# Patient Record
Sex: FEMALE | Race: BLACK OR AFRICAN AMERICAN | Employment: FULL TIME | URBAN - METROPOLITAN AREA
[De-identification: names, ages, dates, MRNs, and addresses within clinical notes are randomized per-mention and may not be internally consistent; named-entity substitution may affect disease eponyms.]

---

## 2017-02-13 ENCOUNTER — NURSE NAVIGATOR (OUTPATIENT)
Dept: FAMILY MEDICINE CLINIC | Age: 37
End: 2017-02-13

## 2017-02-20 ENCOUNTER — APPOINTMENT (OUTPATIENT)
Dept: GENERAL RADIOLOGY | Age: 37
End: 2017-02-20
Attending: PHYSICIAN ASSISTANT
Payer: MEDICAID

## 2017-02-20 ENCOUNTER — HOSPITAL ENCOUNTER (EMERGENCY)
Age: 37
Discharge: HOME | End: 2017-02-20
Attending: EMERGENCY MEDICINE | Admitting: EMERGENCY MEDICINE
Payer: MEDICAID

## 2017-02-20 VITALS
SYSTOLIC BLOOD PRESSURE: 114 MMHG | DIASTOLIC BLOOD PRESSURE: 79 MMHG | HEART RATE: 75 BPM | RESPIRATION RATE: 18 BRPM | BODY MASS INDEX: 43.3 KG/M2 | OXYGEN SATURATION: 98 % | TEMPERATURE: 98 F | WEIGHT: 269.4 LBS | HEIGHT: 66 IN

## 2017-02-20 DIAGNOSIS — S39.012A LUMBAR STRAIN, INITIAL ENCOUNTER: ICD-10-CM

## 2017-02-20 DIAGNOSIS — S96.912A ANKLE STRAIN, LEFT, INITIAL ENCOUNTER: Primary | ICD-10-CM
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
## 2017-02-20 DIAGNOSIS — S66.912A WRIST STRAIN, LEFT, INITIAL ENCOUNTER: ICD-10-CM

## 2017-02-20 PROCEDURE — 72100 X-RAY EXAM L-S SPINE 2/3 VWS: CPT

## 2017-02-20 PROCEDURE — 99282 EMERGENCY DEPT VISIT SF MDM: CPT

## 2017-02-20 PROCEDURE — 73110 X-RAY EXAM OF WRIST: CPT

## 2017-02-20 PROCEDURE — 73610 X-RAY EXAM OF ANKLE: CPT

## 2017-02-20 RX ORDER — IBUPROFEN 800 MG/1
800 TABLET ORAL
Qty: 30 TAB | Refills: 0 | Status: SHIPPED | OUTPATIENT
Start: 2017-02-20 | End: 2017-03-02

## 2017-02-20 RX ORDER — CYCLOBENZAPRINE HCL 10 MG
10 TABLET ORAL
Qty: 20 TAB | Refills: 0 | Status: SHIPPED | OUTPATIENT
Start: 2017-02-20 | End: 2017-05-26

## 2017-02-20 NOTE — DISCHARGE INSTRUCTIONS
Getting Back to Normal After Low Back Pain: Care Instructions  Your Care Instructions  Almost everyone has low back pain at some time. The good news is that most low back pain will go away in a few days or weeks with some basic self-care. Some people are afraid that doing too much may make their pain worse. In the past, people stayed in bed, thinking this would help their backs. Now doctors think that, in most cases, getting back to your normal activities is good for your back, as long as you avoid doing things that make your pain worse. Follow-up care is a key part of your treatment and safety. Be sure to make and go to all appointments, and call your doctor if you are having problems. It's also a good idea to know your test results and keep a list of the medicines you take. How can you care for yourself at home? Ease back into daily activities  · For the first day or two of pain, take it easy. But as soon as possible, get back to your normal daily life and activities. · Get gentle exercise, such as walking. Movement keeps your spine flexible and helps your muscles stay strong. · If you are an athlete, return to your activity carefully. Choose a low-impact option until your pain is under control. Avoid or change activities that cause pain  · Try to avoid too much bending, heavy lifting, or reaching. These movements put extra stress on your back. · In bed, try lying on your side with a pillow between your knees. Or lie on your back on the floor with a pillow under your knees. · When you sit, place a small pillow, a rolled-up towel, or a lumbar roll in the curve of your back for extra support. · Try putting one foot up on a stool or changing positions every few minutes if you have to stand still for a period of time. Pay attention to body mechanics and posture  Body mechanics are the way you use your body. Posture is the way you sit or stand. · Take extra care when you lift.  When you must lift, bend your knees and keep your back straight. Avoid twisting, and keep the load close to your body. · Stand or sit tall, with your shoulders back and your stomach pulled in to support your back. Get support when you need it  · Let people know when you need a helping hand. Get family members or friends to help out with tasks you cannot do right now. · Be honest with your doctor about how the pain affects you. · If you have had to take time off work, talk to your doctor and boss about a gradual [id number] plan. Find out if there are other ways you could do your job to avoid hurting your back again. Reduce stress  Worrying about the pain can cause you to tense the muscles in your lower back. This in turn causes more pain. Here are a few things you can do to relax your mind and your muscles:  · Take 10 to 15 minutes to sit quietly and breathe deeply. Try to focus only on your breathing. If you cannot keep thoughts away, think about things that make you feel good. · Get involved in your favorite hobby, or try something new. · Talk to a friend, read a book, or listen to your favorite music. · Find a counselor you like and trust. Talk openly and honestly about your problems. Be willing to make some changes. When should you call for help? Call 911 anytime you think you may need emergency care. For example, call if:  · You are unable to move a leg at all. Call your doctor now or seek immediate medical care if:  · You have new or worse symptoms in your legs, belly, or buttocks. Symptoms may include:  ¨ Numbness or tingling. ¨ Weakness. ¨ Pain. · You lose bladder or bowel control. Watch closely for changes in your health, and be sure to contact your doctor if:  · You are not getting better as expected. Where can you learn more? Go to http://joao-ladi.info/. Enter N852 in the search box to learn more about \"Getting Back to Normal After Low Back Pain: Care Instructions. \"  Current as of:  May 23, 2016  Content Version: 11.1  © 4954-3354 [employer]. Care instructions adapted under license by [employer] (which disclaims liability or warranty for this information). If you have questions about a medical condition or this instruction, always ask your healthcare professional. [address] any warranty or liability for your use of this information. Hand Pain: Care Instructions  Your Care Instructions  Common causes of hand pain are overuse and injuries, such as might happen during sports or home repair projects. Everyday wear and tear, especially as you get older, also can cause hand pain. Most minor hand injuries will heal on their own, and home treatment is usually all you need to do. If you have sudden and severe pain, you may need tests and treatment. Follow-up care is a key part of your treatment and safety. Be sure to make and go to all appointments, and call your doctor if you are having problems. Its also a good idea to know your test results and keep a list of the medicines you take. How can you care for yourself at home? · Take pain medicines exactly as directed. ¨ If the doctor gave you a prescription medicine for pain, take it as prescribed. ¨ If you are not taking a prescription pain medicine, ask your doctor if you can take an over-the-counter medicine. · Rest and protect your hand. Take a break from any activity that may cause pain. · Put ice or a cold pack on your hand for 10 to 20 minutes at a time. Put a thin cloth between the ice and your skin. · Prop up the sore hand on a pillow when you ice it or anytime you sit or lie down during the next 3 days. Try to keep it above the level of your heart. This will help reduce swelling. · If your doctor recommends a sling, splint, or elastic bandage to support your hand, wear it as directed. When should you call for help? Call 911 anytime you think you may need emergency care.  For example, call if:  · Your hand turns cool or pale or changes color. Call your doctor now or seek immediate medical care if:  · You cannot move your hand. · Your hand pops, moves out of its normal position, and then returns to its normal position. · You have signs of infection, such as:  ¨ Increased pain, swelling, warmth, or redness. ¨ Red streaks leading from the sore area. ¨ Pus draining from a place on your hand. ¨ A fever. · Your hand feels numb or tingly. Watch closely for changes in your health, and be sure to contact your doctor if:  · Your hand feels unstable when you try to use it. · You do not get better as expected. · You have any new symptoms, such as swelling. · Bruises from an injury to your hand last longer than 2 weeks. Where can you learn more? Go to http://joaoAppfluent Technologyladi.info/. Enter R273 in the search box to learn more about \"Hand Pain: Care Instructions. \"  Current as of: May 27, 2016  Content Version: 11.1  © 0064-1901 [employer]. Care instructions adapted under license by [employer] (which disclaims liability or warranty for this information). If you have questions about a medical condition or this instruction, always ask your healthcare professional. [address] any warranty or liability for your use of this information. Ankle Sprain: Care Instructions  Your Care Instructions    An ankle sprain can happen when you twist your ankle. The ligaments that support the ankle can get stretched and torn. Often the ankle is swollen and painful. Ankle sprains may take from several weeks to several months to heal. Usually, the more pain and swelling you have, the more severe your ankle sprain is and the longer it will take to heal. You can heal faster and regain strength in your ankle with good home treatment.   It is very important to give your ankle time to heal completely, so that you do not easily hurt your ankle again. Follow-up care is a key part of your treatment and safety. Be sure to make and go to all appointments, and call your doctor if you are having problems. It's also a good idea to know your test results and keep a list of the medicines you take. How can you care for yourself at home? · Prop up your foot on pillows as much as possible for the next 3 days. Try to keep your ankle above the level of your heart. This will help reduce the swelling. · Follow your doctor's directions for wearing a splint or elastic bandage. Wrapping the ankle may help reduce or prevent swelling. · Your doctor may give you a splint, a brace, an air stirrup, or another form of ankle support to protect your ankle until it is healed. Wear it as directed while your ankle is healing. Do not remove it unless your doctor tells you to. After your ankle has healed, ask your doctor whether you should wear the brace when you exercise. · Put ice or cold packs on your injured ankle for 10 to 20 minutes at a time. Try to do this every 1 to 2 hours for the next 3 days (when you are awake) or until the swelling goes down. Put a thin cloth between the ice and your skin. · You may need to use crutches until you can walk without pain. If you do use crutches, try to bear some weight on your injured ankle if you can do so without pain. This helps the ankle heal.  · Take pain medicines exactly as directed. ¨ If the doctor gave you a prescription medicine for pain, take it as prescribed. ¨ If you are not taking a prescription pain medicine, ask your doctor if you can take an over-the-counter medicine. · If you have been given ankle exercises to do at home, do them exactly as instructed. These can promote healing and help prevent lasting weakness. When should you call for help? Call your doctor now or seek immediate medical care if:  · Your pain is getting worse. · Your swelling is getting worse.   · Your splint feels too tight or you are unable to loosen it. Watch closely for changes in your health, and be sure to contact your doctor if:  · You are not getting better after 1 week. Where can you learn more? Go to http://joao-ladi.info/. Enter B472 in the search box to learn more about \"Ankle Sprain: Care Instructions. \"  Current as of: May 23, 2016  Content Version: 11.1  © 4562-0729 [employer] Care instructions adapted under license by [employer] (which disclaims liability or warranty for this information). If you have questions about a medical condition or this instruction, always ask your healthcare professional. [address] any warranty or liability for your use of this information.

## 2017-02-20 NOTE — ED PROVIDER NOTES
HPI Comments: Melony Liang, 40 y.o. female, presents ambulatory to ED [facility] ED with cc of aching left ankle pain with associated swelling, left wrist pain, and low back pain secondary to a mechanical GLF that occurred two days ago. The patient reports that her pain started yesterday. The patient reports that she slipped on a puddle of water and fell backwards and caught herself with her left hand. The patient states that she applied a warm compress and took ibuprofen 800 mg with no relief. The patient states that she is right-handed. The patient denies any prior injuries to her ankle/wrist/back, head injury, or chance of pregnancy. The patient specifically denies a headache, chest pain, abdominal pain, or other acute complaints at this time. PCP: Geovanna Lawson MD    Social history significant for: + Tobacco (1 PPD), - EtOH, - Illicit Drug Use    There are no other complaints, changes, or physical findings at this time. Written by Miko Garcia ED Scribe, as dictated by Marii Jimenez .    The history is provided by the patient. No  was used. Past Medical History:   Diagnosis Date    Arthritis     Asthma      bronchitis    Bronchitis     HTN, goal below 140/90 2012    Psychiatric disorder      bipolar       Past Surgical History:   Procedure Laterality Date    Delivery       Hx tubal ligation       c section    Hx heent       4 wisdom teeth removed in 2011         Family History:   Problem Relation Age of Onset    Hypertension Father     Psychiatric Disorder Father      schzophrenia    Asthma Son     Asthma Son     Asthma Daughter        Social History     Social History    Marital status: SINGLE     Spouse name: N/A    Number of children: N/A    Years of education: N/A     Occupational History    Not on file.      Social History Main Topics    Smoking status: Current Every Day Smoker     Packs/day: 1.00     Years: 12.00    Smokeless tobacco: Never Used    Alcohol use No    Drug use: No    Sexual activity: Yes     Partners: Male     Birth control/ protection: Condom     Other Topics Concern    Not on file     Social History Narrative         ALLERGIES: Codeine; Tessalon [benzonatate]; and Tramadol    Review of Systems   Constitutional: Negative for chills and fever. Respiratory: Negative for stridor. Cardiovascular: Negative for chest pain. Gastrointestinal: Negative for abdominal pain, diarrhea, nausea and vomiting. Musculoskeletal: Positive for arthralgias (Left ankle, left wrist), back pain and joint swelling (Left ankle). Neurological: Negative for headaches. All other systems reviewed and are negative. Vitals:    02/20/17 0736 02/20/17 0901   BP: 140/76 114/79   Pulse: 74 75   Resp: 18 18   Temp: 98 °F (36.7 °C)    SpO2: 100% 98%   Weight: 122.2 kg (269 lb 6.4 oz)    Height: 5' 6\" (1.676 m)             Physical Exam   Constitutional: She is oriented to person, place, and time. She appears well-developed and well-nourished. No distress. 39 yo -American female   HENT:   Head: Normocephalic and atraumatic. Eyes: Conjunctivae are normal. Right eye exhibits no discharge. Left eye exhibits no discharge. Neck: Normal range of motion. Neck supple. Cardiovascular: Normal rate, regular rhythm, normal heart sounds and intact distal pulses. No murmur heard. Pulmonary/Chest: Effort normal and breath sounds normal. No respiratory distress. She has no wheezes. Musculoskeletal:   BACK: Normal spinal curvatures. No step off or deformity. NT to palpation. No contusions or abrasions. Negative supine SLR bilaterally. Strength of the LE 5/5 and equal bilaterally. L HAND/WRIST: No swelling, ecchymosis or deformity. NT to palpation with from of the wrist and fingers. Distal NV intact. Cap refill < 2 sec. L ANKLE: No swelling, ecchymosis or deformity.  Slight TTP to the anterior ankle; otherwise NT to palpation of the remainder of the lower extremity. FROM of the ankle and toes. No crepitus or laxity. Ambulatory without difficulty or limp. Lymphadenopathy:     She has no cervical adenopathy. Neurological: She is alert and oriented to person, place, and time. Skin: Skin is warm and dry. She is not diaphoretic. Psychiatric: She has a normal mood and affect. Her behavior is normal.   Nursing note and vitals reviewed. MDM  Number of Diagnoses or Management Options  Diagnosis management comments:   DDx: Sprain, strain, fracture, contusion, soft tissue injury. After physical examination, the patient and no indication of a trauma to suggest a fracture. Xrays were ordered per patient request.       Amount and/or Complexity of Data Reviewed  Tests in the radiology section of CPT®: ordered and reviewed  Review and summarize past medical records: yes    Patient Progress  Patient progress: stable    ED Course       Procedures    7:51 AM  Discussed the risks of smoking and the benefits of smoking cessation as well as the long term sequelae of smoking with the pt. The patient verbalized their understanding. Written by Rae Ruiz ED Scribe, as dictated by Dave Tovar.    Progress Note:  9:30 AM  The patient's imaging results have been discussed with her, and she conveys her understanding of the results. The patient conveys her agreement with the plan of care. Written by Rae Ruiz ED Scribe, as dictated by Dave Tovar.    IMAGING RESULTS:  EXAM: XR WRIST LT AP/LAT/OBL MIN 3V     INDICATION: Trauma. Fell 2 days ago. Pain in left wrist.     COMPARISON: Left wrist x-ray 2/17/16.     FINDINGS: Three views of the left wrist demonstrate no fracture or other acute  osseous or articular abnormality.  The soft tissues are within normal limits.     IMPRESSION  IMPRESSION: No acute osseous abnormality.            EXAM: XR ANKLE LT MIN 3 V     INDICATION: Trauma.     COMPARISON: None.     FINDINGS: Three views of the left ankle demonstrate no fracture or disruption of  the ankle mortise. There is no other acute osseous or articular abnormality. The soft tissues are within normal limits.     IMPRESSION  IMPRESSION: No acute osseous abnormality demonstrated. No significant change                   EXAM: XR SPINE LUMB 2 OR 3 V  INDICATION: Back Pain, patient post fall 2 days ago. Back pain after fall 2  days ago. COMPARISON: Lumbar spine x-ray 8/6/15  TECHNIQUE:   Standard three-view lumbar spine x-ray with frontal, lateral and cone-down  lumbosacral views. FINDINGS:   Vertebral body heights and disc spaces are adequately maintained. 5 normal  lumbar type vertebra are demonstrated. No evidence of destruction fracture or other acute osseous abnormality     IMPRESSION  IMPRESSION: No acute osseous abnormality or interval change.            IMPRESSION:  1. Ankle strain, left, initial encounter    2. Wrist strain, left, initial encounter    3. Lumbar strain, initial encounter    4. Fall, initial encounter        PLAN:  1. Current Discharge Medication List      START taking these medications    Details   cyclobenzaprine (FLEXERIL) 10 mg tablet Take 1 Tab by mouth three (3) times daily as needed for Muscle Spasm(s). Qty: 20 Tab, Refills: 0      ibuprofen (MOTRIN) 800 mg tablet Take 1 Tab by mouth every eight (8) hours as needed for Pain for up to 10 days. Qty: 30 Tab, Refills: 0         CONTINUE these medications which have NOT CHANGED    Details   albuterol (PROVENTIL HFA, VENTOLIN HFA, PROAIR HFA) 90 mcg/actuation inhaler Take 2 Puffs by inhalation every four (4) hours as needed for Wheezing. HYDROCHLOROTHIAZIDE PO Take 12.5 mg by mouth. 2.   Follow-up Information     Follow up With Details Comments [address] [place], MD In 1 week  [address] [place] [id number] [phone]        3. Back - Rest, heat, massage and gentle stretches  4.  Ankle/Wrist - Ice and elevate  Return to ED if worse Discharge Note:  9:31 AM  The pt is ready for discharge. The pt's signs, symptoms, diagnosis, and discharge instructions have been discussed and pt has conveyed their understanding. The pt is to follow up as recommended or return to ER should their symptoms worsen. Plan has been discussed and pt is in agreement. This note is prepared by Elmer Osman, acting as a Scribe for ALISHA Langford : The scribe's documentation has been prepared under my direction and personally reviewed by me in its entirety. I confirm that the notes above accurately reflects all work, treatment, procedures, and medical decision making performed by me. 9:40 AM   After patient discharged from ED room 8, patient exited to the ER lobby and realized she left her phone  in the room. Patient walked very briskly back to room 8 while ED tech was cleaning room to obtain her . Patient exited with  in her hand.

## 2017-02-20 NOTE — ED TRIAGE NOTES
PT. Presents to ED today for complaints of L knee, hand a back pain after a fall on Saturday. Pt. States that [place]. Pt. Denies hitting her head. Pt. Alert and oriented x4. PT. Placed in bed in position of comfort with call bell in reach.

## 2017-02-20 NOTE — ED NOTES
Patient discharged by CAROL Walsh. Patient provided with discharge instructions Rx and instructions on follow up care. Patient out of ED ambulatory accompanied by self.

## 2017-03-20 ENCOUNTER — HOSPITAL ENCOUNTER (EMERGENCY)
Age: 37
Discharge: HOME | End: 2017-03-20
Attending: EMERGENCY MEDICINE
Payer: MEDICAID

## 2017-03-20 ENCOUNTER — APPOINTMENT (OUTPATIENT)
Dept: GENERAL RADIOLOGY | Age: 37
End: 2017-03-20
Attending: EMERGENCY MEDICINE
Payer: MEDICAID

## 2017-03-20 VITALS
BODY MASS INDEX: 43.72 KG/M2 | RESPIRATION RATE: 18 BRPM | TEMPERATURE: 98.2 F | SYSTOLIC BLOOD PRESSURE: 150 MMHG | DIASTOLIC BLOOD PRESSURE: 91 MMHG | OXYGEN SATURATION: 99 % | WEIGHT: 272.05 LBS | HEIGHT: 66 IN | HEART RATE: 71 BPM

## 2017-03-20 DIAGNOSIS — R19.7 DIARRHEA, UNSPECIFIED TYPE: ICD-10-CM

## 2017-03-20 DIAGNOSIS — J20.9 ACUTE BRONCHITIS, UNSPECIFIED ORGANISM: Primary | ICD-10-CM

## 2017-03-20 LAB
FLUAV AG NPH QL IA: NEGATIVE
FLUBV AG NOSE QL IA: NEGATIVE

## 2017-03-20 PROCEDURE — 77030029684 HC NEB SM VOL KT MONA -A

## 2017-03-20 PROCEDURE — 94640 AIRWAY INHALATION TREATMENT: CPT

## 2017-03-20 PROCEDURE — 74011000250 HC RX REV CODE- 250: Performed by: EMERGENCY MEDICINE

## 2017-03-20 PROCEDURE — 99282 EMERGENCY DEPT VISIT SF MDM: CPT

## 2017-03-20 PROCEDURE — 87804 INFLUENZA ASSAY W/OPTIC: CPT | Performed by: EMERGENCY MEDICINE

## 2017-03-20 PROCEDURE — 71020 XR CHEST PA LAT: CPT

## 2017-03-20 RX ORDER — PREDNISONE 20 MG/1
60 TABLET ORAL DAILY
Qty: 15 TAB | Refills: 0 | Status: SHIPPED | OUTPATIENT
Start: 2017-03-20 | End: 2017-03-25

## 2017-03-20 RX ORDER — LOPERAMIDE HYDROCHLORIDE 2 MG/1
2 CAPSULE ORAL
Qty: 20 CAP | Refills: 0 | Status: SHIPPED | OUTPATIENT
Start: 2017-03-20 | End: 2017-03-20

## 2017-03-20 RX ORDER — PREDNISONE 20 MG/1
60 TABLET ORAL DAILY
Qty: 15 TAB | Refills: 0 | Status: SHIPPED | OUTPATIENT
Start: 2017-03-20 | End: 2017-03-20

## 2017-03-20 RX ORDER — IPRATROPIUM BROMIDE 0.5 MG/2.5ML
0.5 SOLUTION RESPIRATORY (INHALATION)
Status: COMPLETED | OUTPATIENT
Start: 2017-03-20 | End: 2017-03-20

## 2017-03-20 RX ORDER — ALBUTEROL SULFATE 0.83 MG/ML
5 SOLUTION RESPIRATORY (INHALATION)
Status: COMPLETED | OUTPATIENT
Start: 2017-03-20 | End: 2017-03-20

## 2017-03-20 RX ORDER — HYDROCODONE POLISTIREX AND CHLORPHENIRAMINE POLISTIREX 10; 8 MG/5ML; MG/5ML
5 SUSPENSION, EXTENDED RELEASE ORAL
Qty: 60 ML | Refills: 0 | Status: SHIPPED | OUTPATIENT
Start: 2017-03-20 | End: 2017-04-09

## 2017-03-20 RX ORDER — GUAIFENESIN, PSEUDOEPHEDRINE HYDROCHLORIDE 600; 60 MG/1; MG/1
1 TABLET, EXTENDED RELEASE ORAL EVERY 12 HOURS
Qty: 20 TAB | Refills: 0 | Status: SHIPPED | OUTPATIENT
Start: 2017-03-20 | End: 2017-05-26

## 2017-03-20 RX ORDER — LOPERAMIDE HYDROCHLORIDE 2 MG/1
2 CAPSULE ORAL
Qty: 20 CAP | Refills: 0 | Status: SHIPPED | OUTPATIENT
Start: 2017-03-20 | End: 2017-03-30

## 2017-03-20 RX ORDER — NAPROXEN 250 MG/1
500 TABLET ORAL
Status: DISCONTINUED | OUTPATIENT
Start: 2017-03-20 | End: 2017-03-20 | Stop reason: HOSPADM

## 2017-03-20 RX ORDER — GUAIFENESIN, PSEUDOEPHEDRINE HYDROCHLORIDE 600; 60 MG/1; MG/1
1 TABLET, EXTENDED RELEASE ORAL EVERY 12 HOURS
Qty: 20 TAB | Refills: 0 | Status: SHIPPED | OUTPATIENT
Start: 2017-03-20 | End: 2017-03-20

## 2017-03-20 RX ADMIN — ALBUTEROL SULFATE 5 MG: 2.5 SOLUTION RESPIRATORY (INHALATION) at 08:08

## 2017-03-20 RX ADMIN — IPRATROPIUM BROMIDE 0.5 MG: 0.5 SOLUTION RESPIRATORY (INHALATION) at 08:08

## 2017-03-20 NOTE — DISCHARGE INSTRUCTIONS
Bronchitis: Care Instructions  Your Care Instructions    Bronchitis is inflammation of the bronchial tubes, which carry air to the lungs. The tubes swell and produce mucus, or phlegm. The mucus and inflamed bronchial tubes make you cough. You may have trouble breathing. Most cases of bronchitis are caused by viruses like those that cause colds. Antibiotics usually do not help and they may be harmful. Bronchitis usually develops rapidly and lasts about 2 to 3 weeks in otherwise healthy people. Follow-up care is a key part of your treatment and safety. Be sure to make and go to all appointments, and call your doctor if you are having problems. It's also a good idea to know your test results and keep a list of the medicines you take. How can you care for yourself at home? · Take all medicines exactly as prescribed. Call your doctor if you think you are having a problem with your medicine. · Get some extra rest.  · Take an over-the-counter pain medicine, such as acetaminophen (Tylenol), ibuprofen (Advil, Motrin), or naproxen (Aleve) to reduce fever and relieve body aches. Read and follow all instructions on the label. · Do not take two or more pain medicines at the same time unless the doctor told you to. Many pain medicines have acetaminophen, which is Tylenol. Too much acetaminophen (Tylenol) can be harmful. · Take an over-the-counter cough medicine that contains dextromethorphan to help quiet a dry, hacking cough so that you can sleep. Avoid cough medicines that have more than one active ingredient. Read and follow all instructions on the label. · Breathe moist air from a humidifier, hot shower, or sink filled with hot water. The heat and moisture will thin mucus so you can cough it out. · Do not smoke. Smoking can make bronchitis worse. If you need help quitting, talk to your doctor about stop-smoking programs and medicines. These can increase your chances of quitting for good.   When should you call for help? Call 911 anytime you think you may need emergency care. For example, call if:  · You have severe trouble breathing. Call your doctor now or seek immediate medical care if:  · You have new or worse trouble breathing. · You cough up dark brown or bloody mucus (sputum). · You have a new or higher fever. · You have a new rash. Watch closely for changes in your health, and be sure to contact your doctor if:  · You cough more deeply or more often, especially if you notice more mucus or a change in the color of your mucus. · You are not getting better as expected. Where can you learn more? Go to http://joao-ladi.info/. Enter H333 in the search box to learn more about \"Bronchitis: Care Instructions. \"  Current as of: May 23, 2016  Content Version: 11.1  © 5487-3923 [employer]. Care instructions adapted under license by [facility] (which disclaims liability or warranty for this information). If you have questions about a medical condition or this instruction, always ask your healthcare professional. [place] [address] any warranty or liability for your use of this information. Diarrhea: Care Instructions  Your Care Instructions    Diarrhea is loose, watery stools (bowel movements). The exact cause is often hard to find. Sometimes diarrhea is your body's way of getting rid of what caused an upset stomach. Viruses, food poisoning, and many medicines can cause diarrhea. Some people get diarrhea in response to emotional stress, anxiety, or certain foods. Almost everyone has diarrhea now and then. It usually isn't serious, and your stools will return to normal soon. The important thing to do is replace the fluids you have lost, so you can prevent dehydration. The doctor has checked you carefully, but problems can develop later. If you notice any problems or new symptoms, get medical treatment right away.   Follow-up care is a key part of your treatment and safety. Be sure to make and go to all appointments, and call your doctor if you are having problems. It's also a good idea to know your test results and keep a list of the medicines you take. How can you care for yourself at home? · Watch for signs of dehydration, which means your body has lost too much water. Dehydration is a serious condition and should be treated right away. Signs of dehydration are:  ¨ Increasing thirst and dry eyes and mouth. ¨ Feeling faint or lightheaded. ¨ Darker urine, and a smaller amount of urine than normal.  · To prevent dehydration, drink plenty of fluids, enough so that your urine is light yellow or clear like water. Choose water and other caffeine-free clear liquids until you feel better. If you have kidney, heart, or liver disease and have to limit fluids, talk with your doctor before you increase the amount of fluids you drink. · Begin eating small amounts of mild foods the next day, if you feel like it. ¨ Try yogurt that has live cultures of Lactobacillus. (Check the label.)  ¨ Avoid spicy foods, fruits, alcohol, and caffeine until 48 hours after all symptoms are gone. ¨ Avoid chewing gum that contains sorbitol. ¨ Avoid dairy products (except for yogurt with Lactobacillus) while you have diarrhea and for 3 days after symptoms are gone. · The doctor may recommend that you take over-the-counter medicine, such as loperamide (Imodium), if you still have diarrhea after 6 hours. Read and follow all instructions on the label. Do not use this medicine if you have bloody diarrhea, a high fever, or other signs of serious illness. Call your doctor if you think you are having a problem with your medicine. When should you call for help? Call 911 anytime you think you may need emergency care. For example, call if:  · You passed out (lost consciousness). · Your stools are maroon or very bloody.   Call your doctor now or seek immediate medical care if:  · You are dizzy or lightheaded, or you feel like you may faint. · Your stools are black and look like tar, or they have streaks of blood. · You have new or worse belly pain. · You have symptoms of dehydration, such as:  ¨ Dry eyes and a dry mouth. ¨ Passing only a little dark urine. ¨ Feeling thirstier than usual.  · You have a new or higher fever. Watch closely for changes in your health, and be sure to contact your doctor if:  · Your diarrhea is getting worse. · You see pus in the diarrhea. · You are not getting better after 2 days (48 hours). Where can you learn more? Go to http://joao-ladi.info/. Enter A253 in the search box to learn more about \"Diarrhea: Care Instructions. \"  Current as of: May 27, 2016  Content Version: 11.1  © 3642-3006 [employer]. Care instructions adapted under license by [employer] (which disclaims liability or warranty for this information). If you have questions about a medical condition or this instruction, always ask your healthcare professional. [address] any warranty or liability for your use of this information.

## 2017-03-20 NOTE — ED PROVIDER NOTES
HPI Comments: Sabrina Tellez, 40 y.o. Female with PMHx significant for bronchitis, presents ambulatory to [facility] ED with cc of a constellation of symptoms such as a dry cough, generalized body aches, clear eye drainage, nasal congestion, and intermittent episodes of diarrhea x 3 days. The patient states that she used her albuterol inhaler at home with no relief. The patient specifically denies fevers, chills, nausea, vomiting, SOB, wheezing, or other acute complaints at this time. PCP: Germaine Del Rio MD    Social history significant for: + Tobacco (1 PPD), - EtOH, - Illicit Drug Use    There are no other complaints, changes, or physical findings at this time. Written by CORKY Gibbs, as dictated by Ivette Gonzalez MD.    The history is provided by the patient. No  was used. Past Medical History:   Diagnosis Date    Arthritis     Asthma     bronchitis    Bronchitis     HTN, goal below 140/90 2012    Psychiatric disorder     bipolar       Past Surgical History:   Procedure Laterality Date    DELIVERY       HX HEENT      4 wisdom teeth removed in 2011    HX TUBAL LIGATION      c section         Family History:   Problem Relation Age of Onset    Hypertension Father     Psychiatric Disorder Father      schzophrenia    Asthma Son     Asthma Son     Asthma Daughter        Social History     Social History    Marital status: SINGLE     Spouse name: N/A    Number of children: N/A    Years of education: N/A     Occupational History    Not on file. Social History Main Topics    Smoking status: Current Every Day Smoker     Packs/day: 1.00     Years: 12.00    Smokeless tobacco: Never Used    Alcohol use No    Drug use: No    Sexual activity: Yes     Partners: Male     Birth control/ protection: Condom     Other Topics Concern    Not on file     Social History Narrative         ALLERGIES: Codeine;  Tessalon [benzonatate]; and Tramadol    Review of Systems   Constitutional: Negative for chills, diaphoresis, fever and unexpected weight change. HENT: Positive for congestion. Negative for rhinorrhea and sore throat. Eyes: Positive for discharge. Negative for pain. Respiratory: Positive for cough. Negative for shortness of breath, wheezing and stridor. Cardiovascular: Negative for chest pain and leg swelling. Gastrointestinal: Positive for diarrhea. Negative for abdominal pain, blood in stool, nausea and vomiting. Genitourinary: Negative for difficulty urinating, dysuria and flank pain. Musculoskeletal: Positive for myalgias. Negative for back pain and neck stiffness. Skin: Negative for rash. Neurological: Negative for seizures, syncope, weakness, light-headedness and headaches. Psychiatric/Behavioral: Negative for confusion. Vitals:    03/20/17 0651   BP: (!) 150/91   Pulse: 71   Resp: 18   Temp: 98.2 °F (36.8 °C)   SpO2: 100%   Weight: 123.4 kg (272 lb 0.8 oz)   Height: 5' 6\" (1.676 m)            Physical Exam   Constitutional: She is oriented to person, place, and time. She appears well-developed and well-nourished. No distress. Elevated BMI   HENT:   Nose: Nose normal.   Mouth/Throat: Oropharynx is clear and moist. Mucous membranes are dry. No oropharyngeal exudate. Eyes: Conjunctivae and EOM are normal. Pupils are equal, round, and reactive to light. Right eye exhibits no discharge. Left eye exhibits no discharge. No scleral icterus. Neck: Normal range of motion. Neck supple. No JVD present. Cardiovascular: Normal rate, regular rhythm, normal heart sounds and intact distal pulses. No murmur heard. Pulmonary/Chest: Effort normal and breath sounds normal. No stridor. No respiratory distress. She has no wheezes. She has no rales. Abdominal: Soft. Bowel sounds are normal. She exhibits no distension. There is no tenderness. There is no rebound and no guarding. Musculoskeletal: She exhibits no edema or tenderness. Neurological: She is alert and oriented to person, place, and time. Skin: Skin is warm and dry. No rash noted. She is not diaphoretic. Psychiatric: She has a normal mood and affect. Nursing note and vitals reviewed. Written by Refugio Stover, ED Scribe, as dictated by Aurelia Gonzalez MD.    MDM  Number of Diagnoses or Management Options  Acute bronchitis, unspecified organism:   Diarrhea, unspecified type:   Diagnosis management comments: Acute bronchitis. Cxray clear. Pt with normal respiratory effort, normal SpO2. No ABX targets reached. Pt also with diarrhea. Pt non-toxic, afebrile, without evidence of significant dehydration. Able to PO hydrate. Stable for discharge. Amount and/or Complexity of Data Reviewed  Clinical lab tests: ordered and reviewed  Tests in the radiology section of CPT®: ordered and reviewed  Review and summarize past medical records: yes    Patient Progress  Patient progress: stable    ED Course       Procedures    7:14 AM  Discussed the risks of smoking and the benefits of smoking cessation as well as the long term sequelae of smoking with the pt. The patient verbalized their understanding. Written by Refugio Stover, ED Scribe, as dictated by Aurelia Gonzalez MD.    LABORATORY TESTS:  Recent Results (from the past 12 hour(s))   INFLUENZA A & B AG (RAPID TEST)    Collection Time: 03/20/17  8:11 AM   Result Value Ref Range    Influenza A Antigen NEGATIVE  NEG      Influenza B Antigen NEGATIVE  NEG         IMAGING RESULTS:  CXR Results  (Last 48 hours)               03/20/17 0751  XR CHEST PA LAT Final result    Impression:  IMPRESSION: No acute intrathoracic disease. Narrative:  CLINICAL HISTORY: Cough, fever    INDICATION: Cough       COMPARISON: None       FINDINGS:    PA and lateral views of the chest are obtained. The cardiopericardial silhouette is within normal limits. There is no pleural   effusion, pneumothorax or focal consolidation present. MEDICATIONS GIVEN:  Medications   naproxen (NAPROSYN) tablet 500 mg (500 mg Oral Refused 3/20/17 0808)   albuterol (PROVENTIL VENTOLIN) nebulizer solution 5 mg (5 mg Nebulization Given 3/20/17 0808)   ipratropium (ATROVENT) 0.02 % nebulizer solution 0.5 mg (0.5 mg Nebulization Given 3/20/17 0808)       IMPRESSION:  1. Acute bronchitis, unspecified organism    2. Diarrhea, unspecified type        PLAN:  1. Current Discharge Medication List      START taking these medications    Details   predniSONE (DELTASONE) 20 mg tablet Take 3 Tabs by mouth daily for 5 days. Qty: 15 Tab, Refills: 0      loperamide (IMODIUM A-D) 2 mg capsule Take 1 Cap by mouth four (4) times daily as needed for Diarrhea for up to 10 days. Qty: 20 Cap, Refills: 0           2. Follow-up Information     Follow up With Details Comments Contact Info    Shane Syed MD Call in 2 days  [address] [facility]  [phone]      [facility] EMERGENCY DEPT  As needed, If symptoms worsen [address]  [address] [facility]  [phone]        Return to ED if worse     Discharge Note:  9:14 AM  The pt is ready for discharge. The pt's signs, symptoms, diagnosis, and discharge instructions have been discussed and pt has conveyed their understanding. The pt is to follow up as recommended or return to ER should their symptoms worsen. Plan has been discussed and pt is in agreement. This note is prepared by Margo Tadeo, acting as a Scribe for Vladimir Saint, MD.    Vladimir Saint, MD: The scribe's documentation has been prepared under my direction and personally reviewed by me in its entirety. I confirm that the notes above accurately reflects all work, treatment, procedures, and medical decision making performed by me.

## 2017-04-08 ENCOUNTER — HOSPITAL ENCOUNTER (EMERGENCY)
Age: 37
Discharge: HOME | End: 2017-04-09
Attending: EMERGENCY MEDICINE
Payer: MEDICAID

## 2017-04-08 VITALS
WEIGHT: 274.91 LBS | TEMPERATURE: 98.8 F | RESPIRATION RATE: 18 BRPM | BODY MASS INDEX: 44.18 KG/M2 | HEIGHT: 66 IN | SYSTOLIC BLOOD PRESSURE: 122 MMHG | HEART RATE: 85 BPM | DIASTOLIC BLOOD PRESSURE: 79 MMHG | OXYGEN SATURATION: 100 %

## 2017-04-08 DIAGNOSIS — J20.9 ACUTE BRONCHITIS, UNSPECIFIED ORGANISM: Primary | ICD-10-CM

## 2017-04-08 DIAGNOSIS — F17.200 TOBACCO USE DISORDER: ICD-10-CM

## 2017-04-08 PROCEDURE — 99282 EMERGENCY DEPT VISIT SF MDM: CPT

## 2017-04-09 RX ORDER — HYDROCODONE POLISTIREX AND CHLORPHENIRAMINE POLISTIREX 10; 8 MG/5ML; MG/5ML
5 SUSPENSION, EXTENDED RELEASE ORAL
Qty: 60 ML | Refills: 0 | Status: SHIPPED | OUTPATIENT
Start: 2017-04-09 | End: 2017-05-26

## 2017-04-09 NOTE — ED NOTES
Assumed care of pt from triage. Pt complaining of dry cough x3 days. Pt thinks may be due to allergies and states \"pollen triggers my bronchitis\". Pt resting quietly. Call bell within reach.

## 2017-04-09 NOTE — DISCHARGE INSTRUCTIONS
Bronchitis: Care Instructions  Your Care Instructions    Bronchitis is inflammation of the bronchial tubes, which carry air to the lungs. The tubes swell and produce mucus, or phlegm. The mucus and inflamed bronchial tubes make you cough. You may have trouble breathing. Most cases of bronchitis are caused by viruses like those that cause colds. Antibiotics usually do not help and they may be harmful. Bronchitis usually develops rapidly and lasts about 2 to 3 weeks in otherwise healthy people. Follow-up care is a key part of your treatment and safety. Be sure to make and go to all appointments, and call your doctor if you are having problems. It's also a good idea to know your test results and keep a list of the medicines you take. How can you care for yourself at home? · Take all medicines exactly as prescribed. Call your doctor if you think you are having a problem with your medicine. · Get some extra rest.  · Take an over-the-counter pain medicine, such as acetaminophen (Tylenol), ibuprofen (Advil, Motrin), or naproxen (Aleve) to reduce fever and relieve body aches. Read and follow all instructions on the label. · Do not take two or more pain medicines at the same time unless the doctor told you to. Many pain medicines have acetaminophen, which is Tylenol. Too much acetaminophen (Tylenol) can be harmful. · Take an over-the-counter cough medicine that contains dextromethorphan to help quiet a dry, hacking cough so that you can sleep. Avoid cough medicines that have more than one active ingredient. Read and follow all instructions on the label. · Breathe moist air from a humidifier, hot shower, or sink filled with hot water. The heat and moisture will thin mucus so you can cough it out. · Do not smoke. Smoking can make bronchitis worse. If you need help quitting, talk to your doctor about stop-smoking programs and medicines. These can increase your chances of quitting for good.   When should you call for help? Call 911 anytime you think you may need emergency care. For example, call if:  · You have severe trouble breathing. Call your doctor now or seek immediate medical care if:  · You have new or worse trouble breathing. · You cough up dark brown or bloody mucus (sputum). · You have a new or higher fever. · You have a new rash. Watch closely for changes in your health, and be sure to contact your doctor if:  · You cough more deeply or more often, especially if you notice more mucus or a change in the color of your mucus. · You are not getting better as expected. Where can you learn more? Go to http://joao-ladi.info/. Enter H333 in the search box to learn more about \"Bronchitis: Care Instructions. \"  Current as of: May 23, 2016  Content Version: 11.2  © 8032-5511 [employer]. Care instructions adapted under license by [employer] (which disclaims liability or warranty for this information). If you have questions about a medical condition or this instruction, always ask your healthcare professional. [address] any warranty or liability for your use of this information.
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
## 2017-05-24 ENCOUNTER — APPOINTMENT (OUTPATIENT)
Dept: GENERAL RADIOLOGY | Age: 37
End: 2017-05-24
Attending: EMERGENCY MEDICINE
Payer: MEDICAID

## 2017-05-24 ENCOUNTER — HOSPITAL ENCOUNTER (EMERGENCY)
Age: 37
Discharge: HOME | End: 2017-05-25
Attending: EMERGENCY MEDICINE | Admitting: EMERGENCY MEDICINE
Payer: MEDICAID

## 2017-05-24 DIAGNOSIS — J06.9 UPPER RESPIRATORY TRACT INFECTION, UNSPECIFIED TYPE: ICD-10-CM

## 2017-05-24 DIAGNOSIS — F17.200 SMOKER: ICD-10-CM

## 2017-05-24 DIAGNOSIS — J42 CHRONIC BRONCHITIS, UNSPECIFIED CHRONIC BRONCHITIS TYPE (HCC): ICD-10-CM

## 2017-05-24 DIAGNOSIS — R05.9 COUGH: Primary | ICD-10-CM

## 2017-05-24 DIAGNOSIS — J02.9 SORE THROAT: ICD-10-CM

## 2017-05-24 LAB
BASOPHILS # BLD AUTO: 0 K/UL (ref 0–0.1)
BASOPHILS # BLD: 0 % (ref 0–1)
DEPRECATED S PYO AG THROAT QL EIA: NEGATIVE
EOSINOPHIL # BLD: 0.2 K/UL (ref 0–0.4)
EOSINOPHIL NFR BLD: 2 % (ref 0–7)
ERYTHROCYTE [DISTWIDTH] IN BLOOD BY AUTOMATED COUNT: 13.4 % (ref 11.5–14.5)
HCT VFR BLD AUTO: 40.6 % (ref 35–47)
HGB BLD-MCNC: 13.6 G/DL (ref 11.5–16)
LYMPHOCYTES # BLD AUTO: 29 % (ref 12–49)
LYMPHOCYTES # BLD: 2.8 K/UL (ref 0.8–3.5)
MCH RBC QN AUTO: 29.8 PG (ref 26–34)
MCHC RBC AUTO-ENTMCNC: 33.5 G/DL (ref 30–36.5)
MCV RBC AUTO: 89 FL (ref 80–99)
MONOCYTES # BLD: 0.8 K/UL (ref 0–1)
MONOCYTES NFR BLD AUTO: 8 % (ref 5–13)
NEUTS SEG # BLD: 5.9 K/UL (ref 1.8–8)
NEUTS SEG NFR BLD AUTO: 61 % (ref 32–75)
PLATELET # BLD AUTO: 241 K/UL (ref 150–400)
RBC # BLD AUTO: 4.56 M/UL (ref 3.8–5.2)
WBC # BLD AUTO: 9.7 K/UL (ref 3.6–11)

## 2017-05-24 PROCEDURE — 74011250636 HC RX REV CODE- 250/636: Performed by: EMERGENCY MEDICINE

## 2017-05-24 PROCEDURE — 85025 COMPLETE CBC W/AUTO DIFF WBC: CPT | Performed by: EMERGENCY MEDICINE

## 2017-05-24 PROCEDURE — 87070 CULTURE OTHR SPECIMN AEROBIC: CPT | Performed by: EMERGENCY MEDICINE

## 2017-05-24 PROCEDURE — 87880 STREP A ASSAY W/OPTIC: CPT | Performed by: EMERGENCY MEDICINE

## 2017-05-24 PROCEDURE — 96361 HYDRATE IV INFUSION ADD-ON: CPT

## 2017-05-24 PROCEDURE — 96374 THER/PROPH/DIAG INJ IV PUSH: CPT

## 2017-05-24 PROCEDURE — 94640 AIRWAY INHALATION TREATMENT: CPT

## 2017-05-24 PROCEDURE — 71020 XR CHEST PA LAT: CPT

## 2017-05-24 PROCEDURE — 36415 COLL VENOUS BLD VENIPUNCTURE: CPT | Performed by: EMERGENCY MEDICINE

## 2017-05-24 PROCEDURE — 99283 EMERGENCY DEPT VISIT LOW MDM: CPT

## 2017-05-24 PROCEDURE — 80053 COMPREHEN METABOLIC PANEL: CPT | Performed by: EMERGENCY MEDICINE

## 2017-05-24 RX ORDER — KETOROLAC TROMETHAMINE 30 MG/ML
30 INJECTION, SOLUTION INTRAMUSCULAR; INTRAVENOUS
Status: COMPLETED | OUTPATIENT
Start: 2017-05-24 | End: 2017-05-24

## 2017-05-24 RX ADMIN — KETOROLAC TROMETHAMINE 30 MG: 30 INJECTION, SOLUTION INTRAMUSCULAR at 23:34

## 2017-05-24 RX ADMIN — SODIUM CHLORIDE 1000 ML: 900 INJECTION, SOLUTION INTRAVENOUS at 23:34

## 2017-05-25 VITALS
TEMPERATURE: 98.5 F | DIASTOLIC BLOOD PRESSURE: 83 MMHG | HEART RATE: 98 BPM | RESPIRATION RATE: 20 BRPM | HEIGHT: 66 IN | OXYGEN SATURATION: 96 % | SYSTOLIC BLOOD PRESSURE: 122 MMHG | WEIGHT: 250 LBS | BODY MASS INDEX: 40.18 KG/M2

## 2017-05-25 LAB
ALBUMIN SERPL BCP-MCNC: 3.8 G/DL (ref 3.5–5)
ALBUMIN/GLOB SERPL: 0.9 {RATIO} (ref 1.1–2.2)
ALP SERPL-CCNC: 76 U/L (ref 45–117)
ALT SERPL-CCNC: 20 U/L (ref 12–78)
ANION GAP BLD CALC-SCNC: 5 MMOL/L (ref 5–15)
AST SERPL W P-5'-P-CCNC: 11 U/L (ref 15–37)
BILIRUB SERPL-MCNC: 0.3 MG/DL (ref 0.2–1)
BUN SERPL-MCNC: 8 MG/DL (ref 6–20)
BUN/CREAT SERPL: 9 (ref 12–20)
CALCIUM SERPL-MCNC: 9 MG/DL (ref 8.5–10.1)
CHLORIDE SERPL-SCNC: 105 MMOL/L (ref 97–108)
CO2 SERPL-SCNC: 30 MMOL/L (ref 21–32)
CREAT SERPL-MCNC: 0.86 MG/DL (ref 0.55–1.02)
GLOBULIN SER CALC-MCNC: 4.1 G/DL (ref 2–4)
GLUCOSE SERPL-MCNC: 101 MG/DL (ref 65–100)
POTASSIUM SERPL-SCNC: 3.7 MMOL/L (ref 3.5–5.1)
PROT SERPL-MCNC: 7.9 G/DL (ref 6.4–8.2)
SODIUM SERPL-SCNC: 140 MMOL/L (ref 136–145)

## 2017-05-25 PROCEDURE — 74011000250 HC RX REV CODE- 250: Performed by: EMERGENCY MEDICINE

## 2017-05-25 PROCEDURE — 77030029684 HC NEB SM VOL KT MONA -A

## 2017-05-25 RX ORDER — DEXAMETHASONE SODIUM PHOSPHATE 4 MG/ML
10 INJECTION, SOLUTION INTRA-ARTICULAR; INTRALESIONAL; INTRAMUSCULAR; INTRAVENOUS; SOFT TISSUE
Status: DISCONTINUED | OUTPATIENT
Start: 2017-05-25 | End: 2017-05-25 | Stop reason: HOSPADM

## 2017-05-25 RX ORDER — PREDNISONE 5 MG/1
TABLET ORAL
Qty: 12 TAB | Refills: 0 | Status: SHIPPED | OUTPATIENT
Start: 2017-05-25 | End: 2017-09-09

## 2017-05-25 RX ORDER — IPRATROPIUM BROMIDE AND ALBUTEROL SULFATE 2.5; .5 MG/3ML; MG/3ML
3 SOLUTION RESPIRATORY (INHALATION) ONCE
Status: COMPLETED | OUTPATIENT
Start: 2017-05-25 | End: 2017-05-25

## 2017-05-25 RX ADMIN — IPRATROPIUM BROMIDE AND ALBUTEROL SULFATE 3 ML: .5; 3 SOLUTION RESPIRATORY (INHALATION) at 00:25

## 2017-05-25 NOTE — ED NOTES
Patient discharged and given discharge instructions by Mickie Garces MD. Patient had an opportunity to ask questions. Patient verbalized understanding of discharge instructions. Patient ambulatory from ED, discharge instructions and prescriptions in hand. Patient accompanied by self.    t left upset because she wanted a prescription and the MD would not give it to her, Mickie Garces MD notified. Pt stated she will just come back to the ED.

## 2017-05-25 NOTE — ED NOTES
Bedside shift change report given to Tony Lema RN (oncoming nurse) by Riri Keller RN (offgoing nurse). Report included the following information SBAR, Kardex, ED Summary and Recent Results.

## 2017-05-25 NOTE — DISCHARGE INSTRUCTIONS
Cough: Care Instructions  Your Care Instructions  A cough is your body's response to something that bothers your throat or airways. Many things can cause a cough. You might cough because of a cold or the flu, bronchitis, or asthma. Smoking, postnasal drip, allergies, and stomach acid that backs up into your throat also can cause coughs. A cough is a symptom, not a disease. Most coughs stop when the cause, such as a cold, goes away. You can take a few steps at home to cough less and feel better. Follow-up care is a key part of your treatment and safety. Be sure to make and go to all appointments, and call your doctor if you are having problems. It's also a good idea to know your test results and keep a list of the medicines you take. How can you care for yourself at home? · Drink lots of water and other fluids. This helps thin the mucus and soothes a dry or sore throat. Honey or lemon juice in hot water or tea may ease a dry cough. · Take cough medicine as directed by your doctor. · Prop up your head on pillows to help you breathe and ease a dry cough. · Try cough drops to soothe a dry or sore throat. Cough drops don't stop a cough. Medicine-flavored cough drops are no better than candy-flavored drops or hard candy. · Do not smoke. Avoid secondhand smoke. If you need help quitting, talk to your doctor about stop-smoking programs and medicines. These can increase your chances of quitting for good. When should you call for help? Call 911 anytime you think you may need emergency care. For example, call if:  · You have severe trouble breathing. Call your doctor now or seek immediate medical care if:  · You cough up blood. · You have new or worse trouble breathing. · You have a new or higher fever. · You have a new rash.   Watch closely for changes in your health, and be sure to contact your doctor if:  · You cough more deeply or more often, especially if you notice more mucus or a change in the color of your mucus. · You have new symptoms, such as a sore throat, an earache, or sinus pain. · You do not get better as expected. Where can you learn more? Go to http://joao-ladi.info/. Enter D279 in the search box to learn more about \"Cough: Care Instructions. \"  Current as of: May 27, 2016  Content Version: 11.2  © 0148-4296 [employer]. Care instructions adapted under license by [employer] (which disclaims liability or warranty for this information). If you have questions about a medical condition or this instruction, always ask your healthcare professional. [address] any warranty or liability for your use of this information.

## 2017-05-25 NOTE — ED NOTES
Assumed care of pt at this time. Pt states that she had a cough with hx of bronchitis in the past.  Pt states that she has cut back on smoking since she has been sick but still currently smokes daily. Pt does not have pain at this time, just discomfort when coughing. Assessment done at this time. Call bell in reach.

## 2017-05-25 NOTE — ED PROVIDER NOTES
HPI Comments: Madhav Fortune is a 40 y.o. female with history significant for HTN and asthma presenting ambulatory to [id number] [place] [address] ED with c/o sudden onset of cold symptoms x a couple of days. Per pt, she has had a nonproductive cough for the past days which has been contributing to her mild SOB and increasingly sore throat. Pt informs taking her inhaler with mild relief. Pt notes nasal congestion and chills alongside that since the previous night. Pt also informs of generalized aches and pains to the body which have been present for the past couple of days. Pt reports prominence of pain to her neck. Pt notes her symptoms cause a moderate amount of discomfort. Pt informs a history of bronchitis and expresses her concern for that. Of note, pt informs she recently get her insurance back. Pt specifically denies any nausea, vomiting, fevers, abdominal pain, headache, or chest pain. PCP: None    PMHx: arthritis, bipolar disorder  PSHx: tubal ligation  Social Hx: - EtOH; + Smoker; - Illicit Drugs    There are no other changes, complaints or physical findings at this time. Written by CORKY Blackmon, as dictated by Mary Almeida MD.     The history is provided by the patient. Past Medical History:   Diagnosis Date    Arthritis     Asthma     bronchitis    Bronchitis     HTN, goal below 140/90 2012    Psychiatric disorder     bipolar     Past Surgical History:   Procedure Laterality Date    DELIVERY       HX HEENT      4 wisdom teeth removed in 2011    HX TUBAL LIGATION      c section       Family History:   Problem Relation Age of Onset    Hypertension Father     Psychiatric Disorder Father      schzophrenia    Asthma Son     Asthma Son     Asthma Daughter      Social History     Social History    Marital status: SINGLE     Spouse name: N/A    Number of children: N/A    Years of education: N/A     Occupational History    Not on file.      Social History Main Topics    Smoking status: Current Every Day Smoker     Packs/day: 1.00     Years: 12.00    Smokeless tobacco: Never Used    Alcohol use No    Drug use: No    Sexual activity: Yes     Partners: Male     Birth control/ protection: Condom     Other Topics Concern    Not on file     Social History Narrative     ALLERGIES: Codeine; Tessalon [benzonatate]; and Tramadol    Review of Systems   Constitutional: Positive for chills. Negative for activity change, appetite change, fatigue and fever. HENT: Positive for congestion and sore throat. Negative for rhinorrhea. Respiratory: Positive for cough and shortness of breath. Negative for wheezing. Cardiovascular: Negative. Negative for chest pain and leg swelling. Gastrointestinal: Negative. Negative for abdominal distention, abdominal pain, constipation, diarrhea, nausea and vomiting. Endocrine: Negative. Genitourinary: Negative for difficulty urinating, dysuria, menstrual problem, vaginal bleeding and vaginal discharge. Musculoskeletal: Positive for arthralgias (generalized ) and myalgias (generalized). Negative for joint swelling. Skin: Negative. Negative for rash. Neurological: Negative. Negative for dizziness, weakness, light-headedness and headaches. Psychiatric/Behavioral: Negative. Vitals:    05/24/17 2303 05/24/17 2304 05/24/17 2305 05/25/17 0013   BP: (!) 153/103 132/79  122/83   Pulse: 98      Resp: 20      Temp: 98.5 °F (36.9 °C)      SpO2: 98% 98% 97% 96%   Weight: 113.4 kg (250 lb)      Height: 5' 6\" (1.676 m)             Physical Exam   Constitutional: She is oriented to person, place, and time. Vital signs are normal. She appears well-developed and well-nourished. Non-toxic appearance. She does not have a sickly appearance. She does not appear ill. No distress. HENT:   Head: Atraumatic. Mouth/Throat: No uvula swelling. No oropharyngeal exudate or posterior oropharyngeal edema.    Mild BL enlargement, symmetric, no exudate/erythema; uvula midline   Eyes: EOM are normal.   Cardiovascular: Normal rate, regular rhythm, normal heart sounds and intact distal pulses. Exam reveals no gallop and no friction rub. No murmur heard. Pulmonary/Chest: Effort normal. No respiratory distress. She has wheezes (faint, scattered L>R). She has no rales. She exhibits no tenderness. Abdominal: Soft. Bowel sounds are normal. She exhibits no distension and no mass. There is no tenderness. There is no rebound and no guarding. Musculoskeletal: Normal range of motion. She exhibits no edema or tenderness. Neurological: She is oriented to person, place, and time. Skin: Skin is warm. Psychiatric: She has a normal mood and affect. Nursing note and vitals reviewed. MDM  Number of Diagnoses or Management Options  Diagnosis management comments: DDx: viral illness, URI, bronchitis, PNA, smoker, strep       Amount and/or Complexity of Data Reviewed  Clinical lab tests: ordered and reviewed  Tests in the radiology section of CPT®: ordered and reviewed  Review and summarize past medical records: yes  Independent visualization of images, tracings, or specimens: yes    Patient Progress  Patient progress: stable    ED Course     Procedures       Progress Note:  12:20 AM  Pt states she feels much better after breathing treatment but is now asking for cough medicine at discharge with codeine. She has not been coughing throughout her ED visit per nursing and during my assessment and re-evaluation. Explained that I felt OTC cough medication would be sufficient since she did not have paroxysms of coughing here. Also, discussed significant role smoking was playing in her upper respiratory complaints. And, advised patient to quit smoking. TOBACCO COUNSELING:  Upon evaluation, pt expressed that they are a current tobacco user. Pt has been counseled on the dangers of smoking and was encouraged to quit as soon as possible in order to decrease further risks to their health. Pt has conveyed their understanding of the risks involved should they continue to use tobacco products. LABORATORY TESTS:  Recent Results (from the past 12 hour(s))   CBC WITH AUTOMATED DIFF    Collection Time: 05/24/17 11:27 PM   Result Value Ref Range    WBC 9.7 3.6 - 11.0 K/uL    RBC 4.56 3.80 - 5.20 M/uL    HGB 13.6 11.5 - 16.0 g/dL    HCT 40.6 35.0 - 47.0 %    MCV 89.0 80.0 - 99.0 FL    MCH 29.8 26.0 - 34.0 PG    MCHC 33.5 30.0 - 36.5 g/dL    RDW 13.4 11.5 - 14.5 %    PLATELET 119 400 - 172 K/uL    NEUTROPHILS 61 32 - 75 %    LYMPHOCYTES 29 12 - 49 %    MONOCYTES 8 5 - 13 %    EOSINOPHILS 2 0 - 7 %    BASOPHILS 0 0 - 1 %    ABS. NEUTROPHILS 5.9 1.8 - 8.0 K/UL    ABS. LYMPHOCYTES 2.8 0.8 - 3.5 K/UL    ABS. MONOCYTES 0.8 0.0 - 1.0 K/UL    ABS. EOSINOPHILS 0.2 0.0 - 0.4 K/UL    ABS. BASOPHILS 0.0 0.0 - 0.1 K/UL   METABOLIC PANEL, COMPREHENSIVE    Collection Time: 05/24/17 11:27 PM   Result Value Ref Range    Sodium 140 136 - 145 mmol/L    Potassium 3.7 3.5 - 5.1 mmol/L    Chloride 105 97 - 108 mmol/L    CO2 30 21 - 32 mmol/L    Anion gap 5 5 - 15 mmol/L    Glucose 101 (H) 65 - 100 mg/dL    BUN 8 6 - 20 MG/DL    Creatinine 0.86 0.55 - 1.02 MG/DL    BUN/Creatinine ratio 9 (L) 12 - 20      GFR est AA >60 >60 ml/min/1.73m2    GFR est non-AA >60 >60 ml/min/1.73m2    Calcium 9.0 8.5 - 10.1 MG/DL    Bilirubin, total 0.3 0.2 - 1.0 MG/DL    ALT (SGPT) 20 12 - 78 U/L    AST (SGOT) 11 (L) 15 - 37 U/L    Alk. phosphatase 76 45 - 117 U/L    Protein, total 7.9 6.4 - 8.2 g/dL    Albumin 3.8 3.5 - 5.0 g/dL    Globulin 4.1 (H) 2.0 - 4.0 g/dL    A-G Ratio 0.9 (L) 1.1 - 2.2     STREP AG SCREEN, GROUP A    Collection Time: 05/24/17 11:27 PM   Result Value Ref Range    Group A Strep Ag ID NEGATIVE  NEG       IMAGING RESULTS:  EXAM: XR CHEST PA LAT     INDICATION: Cough with history of bronchitis. Smoker.     COMPARISON: 3/20/2017.     FINDINGS: PA and lateral radiographs of the chest demonstrate clear lungs.  The  cardiac and mediastinal contours and pulmonary vascularity are normal. The  bones and soft tissues are within normal limits.      IMPRESSION  IMPRESSION: No acute finding or significant change. MEDICATIONS GIVEN:  Medications   sodium chloride 0.9 % bolus infusion 1,000 mL (0 mL IntraVENous IV Completed 5/25/17 0054)   ketorolac (TORADOL) injection 30 mg (30 mg IntraVENous Given 5/24/17 1094)   albuterol-ipratropium (DUO-NEB) 2.5 MG-0.5 MG/3 ML (3 mL Nebulization Given 5/25/17 0025)     IMPRESSION:  1. Cough    2. Upper respiratory tract infection, unspecified type    3. Sore throat    4. Smoker    5. Chronic bronchitis, unspecified chronic bronchitis type ([facility] [address]      PLAN:  1. Current Discharge Medication List      START taking these medications    Details   predniSONE (STERAPRED) 5 mg dose pack Take three tabs daily by mouth with food for four days  Qty: 12 Tab, Refills: 0           2. Follow-up Information     Follow up With Details Comments [address], MD Schedule an appointment as soon as possible for a visit in 1 week [address] PCP AS [facility] AS POSSIBLE [address] [facility] [address] [phone]      [facility] EMERGENCY DEPT  As needed, If symptoms worsen [address] [place] [address]  [phone]        Return to ED if worse     DISCHARGE NOTE:    12:21 AM  The patient is ready for discharge. The patient signs, symptoms, diagnosis, and discharge instructions have been discussed and the patient has conveyed their understanding. The patient is to follow-up as reccommended or returned to the ER should their symptoms worsen. Plan has been discussed and patient is in agreement. This note is prepared by Jennyfer Espinoza acting as Scribe for Fany Sloan MD.    Fany Sloan MD: This scribe's documentation has been prepared under my direction and personally reviewed by me in its entirety.  I confirm that the note above accurately reflects all work, treatment procedures and medical decision makings performed by me.

## 2017-05-26 ENCOUNTER — HOSPITAL ENCOUNTER (EMERGENCY)
Age: 37
Discharge: HOME | End: 2017-05-26
Attending: EMERGENCY MEDICINE
Payer: MEDICAID

## 2017-05-26 VITALS
SYSTOLIC BLOOD PRESSURE: 145 MMHG | DIASTOLIC BLOOD PRESSURE: 93 MMHG | OXYGEN SATURATION: 99 % | WEIGHT: 259.48 LBS | TEMPERATURE: 98.1 F | HEIGHT: 66 IN | BODY MASS INDEX: 41.7 KG/M2 | RESPIRATION RATE: 18 BRPM | HEART RATE: 81 BPM

## 2017-05-26 DIAGNOSIS — J06.9 ACUTE UPPER RESPIRATORY INFECTION: Primary | ICD-10-CM

## 2017-05-26 DIAGNOSIS — J20.9 ACUTE BRONCHITIS, UNSPECIFIED ORGANISM: ICD-10-CM

## 2017-05-26 PROCEDURE — 99282 EMERGENCY DEPT VISIT SF MDM: CPT

## 2017-05-26 RX ORDER — AZITHROMYCIN 250 MG/1
TABLET, FILM COATED ORAL
Qty: 6 TAB | Refills: 0 | Status: SHIPPED | OUTPATIENT
Start: 2017-05-26 | End: 2017-05-31

## 2017-05-26 RX ORDER — TRIAMCINOLONE ACETONIDE 55 UG/1
2 SPRAY, METERED NASAL DAILY
Qty: 1 BOTTLE | Refills: 0 | Status: SHIPPED | OUTPATIENT
Start: 2017-05-26 | End: 2017-11-12

## 2017-05-26 RX ORDER — GUAIFENESIN DEXTROMETHORPHAN HYDROBROMIDE ORAL SOLUTION 10; 100 MG/5ML; MG/5ML
10 SOLUTION ORAL
Qty: 355 ML | Refills: 0 | Status: SHIPPED | OUTPATIENT
Start: 2017-05-26 | End: 2017-09-09

## 2017-05-26 NOTE — ED PROVIDER NOTES
HPI Comments: Markell Singleton is a 40 y.o. F with PMHx significant for Bronchitis / Arthritis / HTN / Asthma who presents ambulatory to ED [facility] ED c/o continuing nasal congestion and cough x 4 days. Pt also endorses watery eyes and difficulty sleeping at night secondary to congestion. She was seen in ED on 17 for same sx and dx with URI, sent home with prednisone. In ED, pt's X-ray and strep test were both negative. Pt reports no relief from Prednisone, Albuterol or Robitussin. Pt states that she is unable to follow up with her PCP until  secondary to her insurance getting canceled. She reports current 1 ppd tobacco use, denies any etOH/drug use. Pt specifically denies any nausea, vomiting or diarrhea. There are no other complaints, changes, or physical findings at this time. The history is provided by the patient. No  was used. Past Medical History:   Diagnosis Date    Arthritis     Asthma     bronchitis    Bronchitis     HTN, goal below 140/90 2012    Psychiatric disorder     bipolar       Past Surgical History:   Procedure Laterality Date    DELIVERY       HX HEENT      4 wisdom teeth removed in 2011    HX TUBAL LIGATION      c section         Family History:   Problem Relation Age of Onset    Hypertension Father     Psychiatric Disorder Father      schzophrenia    Asthma Son     Asthma Son     Asthma Daughter        Social History     Social History    Marital status: SINGLE     Spouse name: N/A    Number of children: N/A    Years of education: N/A     Occupational History    Not on file.      Social History Main Topics    Smoking status: Current Every Day Smoker     Packs/day: 1.00     Years: 12.00    Smokeless tobacco: Never Used    Alcohol use No    Drug use: No    Sexual activity: Yes     Partners: Male     Birth control/ protection: Condom     Other Topics Concern    Not on file     Social History Narrative         ALLERGIES: Codeine; Tessalon [benzonatate]; and Tramadol    Review of Systems   Constitutional: Negative for chills, diaphoresis and fever. Positive for difficulty sleeping secondary to congestion   HENT: Positive for congestion. Negative for rhinorrhea and trouble swallowing. Eyes:        Positive for watery eyes   Respiratory: Positive for cough. Negative for shortness of breath. Cardiovascular: Negative. Gastrointestinal: Negative. Negative for abdominal pain, nausea and vomiting. Endocrine: Negative. Musculoskeletal: Negative for arthralgias, myalgias, neck pain and neck stiffness. Skin: Negative. Negative for rash. Allergic/Immunologic: Negative. Neurological: Negative. Negative for dizziness, syncope, weakness and headaches. Hematological: Negative. Psychiatric/Behavioral: Negative. Vitals:    05/26/17 0818   BP: (!) 145/93   Pulse: 81   Resp: 18   Temp: 98.1 °F (36.7 °C)   SpO2: 99%   Weight: 117.7 kg (259 lb 7.7 oz)   Height: 5' 6\" (1.676 m)            Physical Exam   Constitutional: She is oriented to person, place, and time. Vital signs are normal. She appears well-developed and well-nourished. Non-toxic appearance. She does not have a sickly appearance. She does not appear ill. HENT:   Head: Normocephalic and atraumatic. Eyes: Conjunctivae, EOM and lids are normal. Pupils are equal, round, and reactive to light. Neck: Trachea normal, normal range of motion and full passive range of motion without pain. Neck supple. Cardiovascular: Normal rate, regular rhythm, normal heart sounds and normal pulses. Pulmonary/Chest: Effort normal and breath sounds normal.   Abdominal: Soft. Normal appearance and bowel sounds are normal.   Musculoskeletal: Normal range of motion. Neurological: She is alert and oriented to person, place, and time. She has normal strength. GCS eye subscore is 4. GCS verbal subscore is 5. GCS motor subscore is 6.    Skin: Skin is warm, dry and intact. Psychiatric: She has a normal mood and affect. Her speech is normal and behavior is normal. Judgment and thought content normal. Cognition and memory are normal.   Nursing note and vitals reviewed. MDM  Number of Diagnoses or Management Options  Acute bronchitis, unspecified organism: minor  Acute upper respiratory infection: minor     Amount and/or Complexity of Data Reviewed  Review and summarize past medical records: yes    Risk of Complications, Morbidity, and/or Mortality  Presenting problems: minimal  Diagnostic procedures: minimal  Management options: minimal    Patient Progress  Patient progress: stable    ED Course       Procedures    Patient Vitals for the past 12 hrs:   Temp Pulse Resp BP SpO2   05/26/17 0818 98.1 °F (36.7 °C) 81 18 (!) 145/93 99 %       IMPRESSION:  1. Acute upper respiratory infection    2. Acute bronchitis, unspecified organism        PLAN:  1. Current Discharge Medication List      START taking these medications    Details   azithromycin (ZITHROMAX Z-USAMA) 250 mg tablet 500 mg on day 1 and 250 mg until finished  Qty: 6 Tab, Refills: 0      triamcinolone (NASACORT) 55 mcg nasal inhaler 2 Sprays by Both Nostrils route daily. Qty: 1 Bottle, Refills: 0      guaiFENesin-dextromethorphan (ADULT TUSSIN COUGH CONGEST DM)  mg/5 mL liqd Take 10 mL by mouth every four (4) hours as needed. Qty: 355 mL, Refills: 0           2. Follow-up Information     Follow up With Details Comments [address] [address]  [phone]        Please follow up with PCP from list provided         Return to ED if worse     DISCHARGE NOTE:  8:40 AM  The patient's results have been reviewed with family and/or caregiver. They verbally convey their understanding and agreement of the patient's signs, symptoms, diagnosis, treatment, and prognosis.  They additionally agree to follow up as recommended in the discharge instructions or to return to the Emergency Room should the patient's condition change prior to their follow-up appointment. The family and/or caregiver verbally agrees with the care-plan and all of their questions have been answered. The discharge instructions have also been provided to the them along with educational information regarding the patient's diagnosis and a list of reasons why the patient would want to return to the ER prior to their follow-up appointment should their condition change. Written by Kade Edwards. Zulma Maldonado, ED Scribe, as dictated by Akilah Akers Pagé FNP-BC. Attestations: This note is prepared by Kade Edwards. Ifrah, acting as Scribe for [employer]. Pagé FNP-BC. Akilah Akers Pagé FNP-BC: The scribe's documentation has been prepared under my direction and personally reviewed by me in its entirety. I confirm that the note above accurately reflects all work, treatment, procedures, and medical decision making performed by me.

## 2017-05-26 NOTE — DISCHARGE INSTRUCTIONS
Bronchitis: Care Instructions  Your Care Instructions    Bronchitis is inflammation of the bronchial tubes, which carry air to the lungs. The tubes swell and produce mucus, or phlegm. The mucus and inflamed bronchial tubes make you cough. You may have trouble breathing. Most cases of bronchitis are caused by viruses like those that cause colds. Antibiotics usually do not help and they may be harmful. Bronchitis usually develops rapidly and lasts about 2 to 3 weeks in otherwise healthy people. Follow-up care is a key part of your treatment and safety. Be sure to make and go to all appointments, and call your doctor if you are having problems. It's also a good idea to know your test results and keep a list of the medicines you take. How can you care for yourself at home? · Take all medicines exactly as prescribed. Call your doctor if you think you are having a problem with your medicine. · Get some extra rest.  · Take an over-the-counter pain medicine, such as acetaminophen (Tylenol), ibuprofen (Advil, Motrin), or naproxen (Aleve) to reduce fever and relieve body aches. Read and follow all instructions on the label. · Do not take two or more pain medicines at the same time unless the doctor told you to. Many pain medicines have acetaminophen, which is Tylenol. Too much acetaminophen (Tylenol) can be harmful. · Take an over-the-counter cough medicine that contains dextromethorphan to help quiet a dry, hacking cough so that you can sleep. Avoid cough medicines that have more than one active ingredient. Read and follow all instructions on the label. · Breathe moist air from a humidifier, hot shower, or sink filled with hot water. The heat and moisture will thin mucus so you can cough it out. · Do not smoke. Smoking can make bronchitis worse. If you need help quitting, talk to your doctor about stop-smoking programs and medicines. These can increase your chances of quitting for good.   When should you call for help? Call 911 anytime you think you may need emergency care. For example, call if:  · You have severe trouble breathing. Call your doctor now or seek immediate medical care if:  · You have new or worse trouble breathing. · You cough up dark brown or bloody mucus (sputum). · You have a new or higher fever. · You have a new rash. Watch closely for changes in your health, and be sure to contact your doctor if:  · You cough more deeply or more often, especially if you notice more mucus or a change in the color of your mucus. · You are not getting better as expected. Where can you learn more? Go to http://joao-ladi.info/. Enter H333 in the search box to learn more about \"Bronchitis: Care Instructions. \"  Current as of: May 23, 2016  Content Version: 11.2  © 0922-5637 [employer]. Care instructions adapted under license by [employer] (which disclaims liability or warranty for this information). If you have questions about a medical condition or this instruction, always ask your healthcare professional. [address] any warranty or liability for your use of this information. Learning About COPD and Upper Respiratory Infections  What are upper respiratory infections? An upper respiratory infection, also called a URI, is an infection of the nose, sinuses, or throat. Viruses or bacteria can cause URIs. Colds, the flu, and sinusitis are examples of URIs. These infections are spread by coughs, sneezes, and close contact. How do these infections affect COPD? A URI can worsen COPD symptoms, such as having too much mucus in your lungs, coughing, or being short of breath. What can you do to manage most infections at home? · Do not smoke. Avoid secondhand smoke. · Take an over-the-counter pain medicine, such as acetaminophen (Tylenol), ibuprofen (Advil, Motrin), or naproxen (Aleve). Read and follow all instructions on the label.   · Be careful when taking over-the-counter cold or flu medicines and Tylenol at the same time. Many of these medicines have acetaminophen, which is Tylenol. Read the labels to make sure that you are not taking more than the recommended dose. Too much acetaminophen (Tylenol) can be harmful. · If your doctor prescribed antibiotics, take them as directed. Do not stop taking them just because you feel better. You need to take the full course of antibiotics. · Ask your doctor about cough medicines and decongestants. Some doctors recommend these medicines, while others feel that they do not help. · Learn breathing techniques for COPD, such as breathing through pursed lips. These techniques can help you breathe easier. What can you do to prevent these infections? Stay healthy  · Do not smoke. This is the most important step you can take to prevent more damage to your lungs. If you need help quitting, talk to your doctor about stop-smoking programs and medicines. These can increase your chances of quitting for good. · Avoid secondhand smoke, air pollution, and high altitudes. Also avoid cold, dry air and hot, humid air. Stay at home with your windows closed when air pollution is bad. · Get a flu shot every year. · Get a pneumococcal vaccine shot. If you have had one before, ask your doctor whether you need another dose. · If you must be around people with colds or the flu, wash your hands often. Exercise and eat well  · If your doctor recommends it, get more exercise. Walking is a good choice. Bit by bit, increase the amount you walk every day. Try for at least 30 minutes on most days of the week. · Eat regular, well-balanced meals. Eating right keeps your energy levels up and helps your body fight infection. · Get plenty of rest and sleep. Follow-up care is a key part of your treatment and safety. Be sure to make and go to all appointments, and call your doctor if you are having problems.  It's also a good idea to know your test results and keep a list of the medicines you take. Where can you learn more? Go to http://joao-ladi.info/. Enter Y599 in the search box to learn more about \"Learning About COPD and Upper Respiratory Infections. \"  Current as of: May 23, 2016  Content Version: 11.2  © 5441-2190 [employer]. Care instructions adapted under license by [employer] (which disclaims liability or warranty for this information). If you have questions about a medical condition or this instruction, always ask your healthcare professional. [address] any warranty or liability for your use of this information. Saline Nasal Washes: Care Instructions  Your Care Instructions  Saline nasal washes help keep the nasal passages open by washing out thick or dried mucus. This simple remedy can help relieve symptoms of allergies, sinusitis, and colds. It also can make the nose feel more comfortable by keeping the mucous membranes moist. You may notice a little burning sensation in your nose the first few times you use the solution, but this usually gets better in a few days. Follow-up care is a key part of your treatment and safety. Be sure to make and go to all appointments, and call your doctor if you are having problems. It's also a good idea to know your test results and keep a list of the medicines you take. How can you care for yourself at home? · You can buy premixed saline solution in a squeeze bottle or other sinus rinse products at a drugstore. Read and follow the instructions on the label. · You also can make your own saline solution by adding 1 teaspoon of salt and 1 teaspoon of baking soda to 2 cups of distilled water. · If you use a homemade solution, pour a small amount into a clean bowl. Using a rubber bulb syringe, squeeze the syringe and place the tip in the salt water.  Pull a small amount of the salt water into the syringe by relaxing your hand. · Sit down with your head tilted slightly back. Do not lie down. Put the tip of the bulb syringe or the squeeze bottle a little way into one of your nostrils. Gently drip or squirt a few drops into the nostril. Repeat with the other nostril. Some sneezing and gagging are normal at first.  · Gently blow your nose. · Wipe the syringe or bottle tip clean after each use. · Repeat this 2 or 3 times a day. · Use nasal washes gently if you have nosebleeds often. When should you call for help? Watch closely for changes in your health, and be sure to contact your doctor if:  · You often get nosebleeds. · You have problems doing the nasal washes. Where can you learn more? Go to http://joao-ladi.info/. Enter [id number] [id number] [id number] in the search box to learn more about \"Saline Nasal Washes: Care Instructions. \"  Current as of: July 29, 2016  Content Version: 11.2  © 2319-8572 [employer]. Care instructions adapted under license by [employer] (which disclaims liability or warranty for this information). If you have questions about a medical condition or this instruction, always ask your healthcare professional. [place] [address] any warranty or liability for your use of this information. Thank you for allowing us to provide you with excellent care today. We hope we addressed all of your concerns and needs. We strive to provide excellent quality care in the Emergency Department. Please rate us as excellent, as anything less than excellent does not meet our expectations. If you feel that you have not received excellent quality care or timely care, please ask to speak to the nurse manager. Please choose us in the future for your continued health care needs. The exam and treatment you received in the Emergency Department were for an urgent problem and are not intended as complete care.  It is important that you follow-up with a doctor, nurse practitioner, or [de-identified] assistant to: (1) confirm your diagnosis, (2) re-evaluation of changes in your illness and treatment, and (3) for ongoing care. If your symptoms become worse or you do not improve as expected and you are unable to reach your usual health care provider, you should return to the Emergency Department. We are available 24 hours a day. Take this sheet with you when you go to your follow-up visit. If you have any problem arranging the follow-up visit, contact the Emergency Department immediately. Make an appointment with your Primary Care doctor for follow up of this visit. Return to the ER if you are unable to be seen in the time recommended on your discharge instructions. Florin Syed  Pagé FNP-BC

## 2017-05-26 NOTE — ED NOTES
Page NP has reviewed discharge instructions with the patient. The patient verbalized understanding. Pt discharged with written instructions. No further concerns at this time.

## 2017-05-27 LAB
BACTERIA SPEC CULT: NORMAL
SERVICE CMNT-IMP: NORMAL

## 2017-08-01 ENCOUNTER — HOSPITAL ENCOUNTER (EMERGENCY)
Age: 37
Discharge: HOME | End: 2017-08-01
Attending: EMERGENCY MEDICINE
Payer: MEDICAID

## 2017-08-01 VITALS
HEART RATE: 87 BPM | WEIGHT: 260 LBS | HEIGHT: 66 IN | BODY MASS INDEX: 41.78 KG/M2 | OXYGEN SATURATION: 100 % | TEMPERATURE: 98.1 F | RESPIRATION RATE: 18 BRPM | SYSTOLIC BLOOD PRESSURE: 139 MMHG | DIASTOLIC BLOOD PRESSURE: 92 MMHG

## 2017-08-01 DIAGNOSIS — Z76.0 ENCOUNTER FOR MEDICATION REFILL: Primary | ICD-10-CM

## 2017-08-01 PROCEDURE — 99282 EMERGENCY DEPT VISIT SF MDM: CPT

## 2017-08-01 RX ORDER — HYDROCHLOROTHIAZIDE 12.5 MG/1
12.5 CAPSULE ORAL DAILY
Qty: 30 CAP | Refills: 0 | Status: SHIPPED | OUTPATIENT
Start: 2017-08-01 | End: 2019-04-29

## 2017-08-01 NOTE — ED PROVIDER NOTES
HPI Comments: Peewee Sears is a 40 y.o. female with a PMH of HTN, bipolar disorder, and asthma presenting ambulatory to the ED from home C/O need for refill of her BP medication. Patient states that she takes Hydrochlorothiazide 12.5 mg once daily. She has been out of her medication for the past 2 days. Patient reports occasional headaches, none currently, and generally has been feeling more tired than normal. She denies vision changes, chest pain, SOB, cough, abdominal pain, hematuria, nausea or vomiting. Patient states that she has been under more stress recently since her son was just in a car accident and she has been worried about him. Patient denies any other symptoms or complaints. PCP: None    There are no other complaints, changes or physical findings at this time. The history is provided by the patient. No  was used. Past Medical History:   Diagnosis Date    Arthritis     Asthma     bronchitis    Bronchitis     HTN, goal below 140/90 2012    Psychiatric disorder     bipolar       Past Surgical History:   Procedure Laterality Date    DELIVERY       HX HEENT      4 wisdom teeth removed in 2011    HX TUBAL LIGATION      c section         Family History:   Problem Relation Age of Onset    Hypertension Father     Psychiatric Disorder Father      schzophrenia    Asthma Son     Asthma Son     Asthma Daughter        Social History     Social History    Marital status: SINGLE     Spouse name: N/A    Number of children: N/A    Years of education: N/A     Occupational History    Not on file.      Social History Main Topics    Smoking status: Current Every Day Smoker     Packs/day: 1.00     Years: 12.00    Smokeless tobacco: Never Used    Alcohol use No    Drug use: No    Sexual activity: Yes     Partners: Male     Birth control/ protection: Condom     Other Topics Concern    Not on file     Social History Narrative         ALLERGIES: Codeine; Tessalon [benzonatate]; and Tramadol    Review of Systems   Constitutional: Negative for chills and fever. HENT: Negative for sore throat. Eyes: Negative for pain. Respiratory: Negative for cough and shortness of breath. Cardiovascular: Negative for chest pain. Gastrointestinal: Negative for abdominal pain, diarrhea, nausea and vomiting. Genitourinary: Negative for dysuria and hematuria. Musculoskeletal: Negative for arthralgias and myalgias. Skin: Negative for rash. Neurological: Positive for headaches (occasional, none currently). Negative for dizziness, light-headedness and numbness. Psychiatric/Behavioral: Negative for behavioral problems and confusion. Vitals:    08/01/17 1916   BP: (!) 139/92   Pulse: 87   Resp: 18   Temp: 98.1 °F (36.7 °C)   SpO2: 100%   Weight: 117.9 kg (260 lb)   Height: 5' 6\" (1.676 m)            Physical Exam   Constitutional: She is oriented to person, place, and time. She appears well-developed and well-nourished. No distress. HENT:   Head: Normocephalic and atraumatic. Right Ear: External ear normal.   Left Ear: External ear normal.   Nose: Nose normal.   Eyes: Conjunctivae and EOM are normal.   Neck: Normal range of motion. Neck supple. Cardiovascular: Normal rate, regular rhythm, normal heart sounds and intact distal pulses. No murmur heard. Pulmonary/Chest: Effort normal and breath sounds normal. She has no decreased breath sounds. She has no wheezes. Abdominal: Soft. Bowel sounds are normal. She exhibits no distension. There is no tenderness. There is no guarding. Musculoskeletal: Normal range of motion. She exhibits no edema or tenderness. Neurological: She is alert and oriented to person, place, and time. Skin: Skin is warm and dry. No rash noted. She is not diaphoretic. Psychiatric: She has a normal mood and affect. Her behavior is normal. Judgment normal.   Nursing note and vitals reviewed.        MDM  Number of Diagnoses or Management Options  Encounter for medication refill:   Diagnosis management comments: Patient presents for medication refill on her blood pressure medication. Patient reports occasional headaches, but denies s/sx associated with end organ damage. BP currently not elevated. Will refill prescription and recommend f/u with PCP for further management. Patient Progress  Patient progress: stable    ED Course       Procedures    Chief Complaint   Patient presents with    Headache     pt reports dizziness and lightheadedness, pt also reports generalized body aches    Medication Refill     pt reports she is out of her HTN meds       7:29 PM  The patients presenting problems have been discussed, and they are in agreement with the care plan formulated and outlined with them. I have encouraged them to ask questions as they arise throughout their visit. MEDICATIONS GIVEN:  Medications - No data to display    LABS REVIEWED:  No results found for this or any previous visit (from the past 24 hour(s)). VITAL SIGNS:  Patient Vitals for the past 12 hrs:   Temp Pulse Resp BP SpO2   08/01/17 1916 98.1 °F (36.7 °C) 87 18 (!) 139/92 100 %     PROGRESS NOTES:  7:29 PM  Initial assessment of patient complete, and patient was given the opportunity to ask questions. Plan of care reviewed with patient and will update when results are available. 8:18 PM  I have reviewed discharge instructions with the patient and explained medications that she is being discharged with. The patient verbalized understanding and agrees with plan. DIAGNOSIS:    1. Encounter for medication refill        PLAN:  1. Discharge home  2. Medications as directed  3. F/u with PCP in 2-3 days  4.  Return precautions reviewed    Follow-up Information     Follow up With Details Comments Contact Info    Establish primary care physician       [facility] EMERGENCY DEPT  As needed, If symptoms worsen [address] [facility]  [phone]        Discharge Medication List as of 8/1/2017  7:47 PM      CONTINUE these medications which have CHANGED    Details   hydroCHLOROthiazide (MICROZIDE) 12.5 mg capsule Take 1 Cap by mouth daily. , Normal, Disp-30 Cap, R-0         CONTINUE these medications which have NOT CHANGED    Details   triamcinolone (NASACORT) 55 mcg nasal inhaler 2 Sprays by Both Nostrils route daily. , Normal, Disp-1 Bottle, R-0      guaiFENesin-dextromethorphan (ADULT TUSSIN COUGH CONGEST DM)  mg/5 mL liqd Take 10 mL by mouth every four (4) hours as needed., Normal, Disp-355 mL, R-0      predniSONE (STERAPRED) 5 mg dose pack Take three tabs daily by mouth with food for four days, Normal, Disp-12 Tab, R-0      albuterol (PROVENTIL HFA, VENTOLIN HFA, PROAIR HFA) 90 mcg/actuation inhaler Take 2 Puffs by inhalation every four (4) hours as needed for Wheezing., Historical Med               ED COURSE: The patients hospital course has been uncomplicated. DISCHARGE NOTE:  8:18 PM  The care plan has been outline with the patient and/or family, who verbally conveyed understanding and agreement. Available results have been reviewed. Patient and/or family understand the follow up plan as outlined and discharge instructions. Should their condition deterioration at any time after discharge the patient agrees to return, follow up sooner than outlined or seek medical assistance at the closest Emergency Room as soon as possible. Questions have been answered.  Discharge instructions and educational information regarding the patient's diagnosis as well a list of reasons why the patient would want to seek immediate medical attention, should their condition change, were reviewed directly with the patient/family         This note will not be viewable in [facility]

## 2017-08-01 NOTE — DISCHARGE INSTRUCTIONS
Thank you for allowing us to provide you with excellent care today. We hope we addressed all of your concerns and needs. We strive to provide excellent quality care in the Emergency Department. Please rate us as excellent, as anything less than excellent does not meet our expectations. If you feel that you have not received excellent quality care or timely care, please ask to speak to the nurse manager. Please choose us in the future for your continued health care needs. The exam and treatment you received in the Emergency Department were for an urgent problem and are not intended as complete care. It is important that you follow-up with a doctor, nurse practitioner, or physician assistant to:  (1) confirm your diagnosis,  (2) re-evaluation of changes in your illness and treatment, and  (3) for ongoing care. If your symptoms become worse or you do not improve as expected and you are unable to reach your usual health care provider, you should return to the Emergency Department. We are available 24 hours a day. Take this sheet with you when you go to your follow-up visit. If you have any problem arranging the follow-up visit, contact [address] [place] [address] 21 [phone])    Make an appointment with your Primary Care doctor for follow up of this visit. Return to the ER if you are unable to be seen in the time recommended on your discharge instructions.

## 2017-08-01 NOTE — LETTER
[facility] EMERGENCY DEPT 
[address] [place] [address] 
[phone] Work/School Note Date: 8/1/2017 To Whom It May concern: 
 
Alesha Prince was seen and treated today in the emergency room by the following provider(s): 
Attending Provider: Katherine Farias MD 
Physician Assistant: Cecelia Siemens, PA-C. Alesha Prince may return to work on 4 August 2017 or sooner. Sincerely, Cecelia Siemens, PA-C

## 2017-08-02 NOTE — ED NOTES
Pt in ED w/ complaint of headache, generalized weakness, and refill of HTN medication. Pt is A&O X 4 and appears in no distress. Provider at bedside. Emergency Department Nursing Plan of Care       The Nursing Plan of Care is developed from the Nursing assessment and Emergency Department Attending provider initial evaluation. The plan of care may be reviewed in the ED Provider note.     The Plan of Care was developed with the following considerations:   Patient / Family readiness to learn indicated by:verbalized understanding  Persons(s) to be included in education: patient and family  Barriers to Learning/Limitations:No    Signed     Toma Warner RN    8/1/2017   8:11 PM

## 2017-09-09 ENCOUNTER — APPOINTMENT (OUTPATIENT)
Dept: GENERAL RADIOLOGY | Age: 37
End: 2017-09-09
Attending: EMERGENCY MEDICINE
Payer: MEDICAID

## 2017-09-09 ENCOUNTER — HOSPITAL ENCOUNTER (EMERGENCY)
Age: 37
Discharge: HOME | End: 2017-09-09
Attending: EMERGENCY MEDICINE
Payer: MEDICAID

## 2017-09-09 VITALS
HEIGHT: 66 IN | WEIGHT: 271.17 LBS | HEART RATE: 64 BPM | OXYGEN SATURATION: 99 % | BODY MASS INDEX: 43.58 KG/M2 | TEMPERATURE: 98.7 F | SYSTOLIC BLOOD PRESSURE: 127 MMHG | DIASTOLIC BLOOD PRESSURE: 88 MMHG | RESPIRATION RATE: 16 BRPM

## 2017-09-09 DIAGNOSIS — H65.91 MIDDLE EAR EFFUSION, RIGHT: ICD-10-CM

## 2017-09-09 DIAGNOSIS — R05.9 COUGH: Primary | ICD-10-CM

## 2017-09-09 PROCEDURE — 99282 EMERGENCY DEPT VISIT SF MDM: CPT

## 2017-09-09 RX ORDER — IBUPROFEN 200 MG
1 TABLET ORAL EVERY 24 HOURS
Qty: 30 PATCH | Refills: 0 | Status: SHIPPED | OUTPATIENT
Start: 2017-09-09 | End: 2017-10-09

## 2017-09-09 RX ORDER — GUAIFENESIN DEXTROMETHORPHAN HYDROBROMIDE ORAL SOLUTION 10; 100 MG/5ML; MG/5ML
10 SOLUTION ORAL
Qty: 355 ML | Refills: 0 | Status: SHIPPED | OUTPATIENT
Start: 2017-09-09 | End: 2017-09-09

## 2017-09-09 RX ORDER — ALBUTEROL SULFATE 90 UG/1
2 AEROSOL, METERED RESPIRATORY (INHALATION)
Qty: 1 INHALER | Refills: 0 | Status: SHIPPED | OUTPATIENT
Start: 2017-09-09 | End: 2018-02-07

## 2017-09-09 RX ORDER — HYDROCODONE POLISTIREX AND CHLORPHENIRAMINE POLISTIREX 10; 8 MG/5ML; MG/5ML
5 SUSPENSION, EXTENDED RELEASE ORAL
Qty: 50 ML | Refills: 0 | Status: SHIPPED | OUTPATIENT
Start: 2017-09-09 | End: 2017-11-12

## 2017-09-09 RX ORDER — AZITHROMYCIN 250 MG/1
TABLET, FILM COATED ORAL
Qty: 6 TAB | Refills: 0 | Status: SHIPPED | OUTPATIENT
Start: 2017-09-09 | End: 2017-11-12

## 2017-09-09 RX ORDER — PREDNISONE 10 MG/1
30 TABLET ORAL
Qty: 9 TAB | Refills: 0 | Status: SHIPPED | OUTPATIENT
Start: 2017-09-09 | End: 2017-09-12

## 2017-09-09 NOTE — ED NOTES
Patient received discharge instructions and questions were answered by ED MD Termeer. Respirations even and unlabored, no signs or symptoms of cardiac distress noted at this time. Any wants or needs verbalized have been addressed to the best of our ability. Patient ambulated from ED with steady gait with discharge instructions in hand. Wheelchair offered and declined by pt, educated patient on policy of discharge by wheelchair, verbalized understanding.

## 2017-09-09 NOTE — DISCHARGE INSTRUCTIONS
Cough: Care Instructions  Your Care Instructions  A cough is your body's response to something that bothers your throat or airways. Many things can cause a cough. You might cough because of a cold or the flu, bronchitis, or asthma. Smoking, postnasal drip, allergies, and stomach acid that backs up into your throat also can cause coughs. A cough is a symptom, not a disease. Most coughs stop when the cause, such as a cold, goes away. You can take a few steps at home to cough less and feel better. Follow-up care is a key part of your treatment and safety. Be sure to make and go to all appointments, and call your doctor if you are having problems. It's also a good idea to know your test results and keep a list of the medicines you take. How can you care for yourself at home? · Drink lots of water and other fluids. This helps thin the mucus and soothes a dry or sore throat. Honey or lemon juice in hot water or tea may ease a dry cough. · Take cough medicine as directed by your doctor. · Prop up your head on pillows to help you breathe and ease a dry cough. · Try cough drops to soothe a dry or sore throat. Cough drops don't stop a cough. Medicine-flavored cough drops are no better than candy-flavored drops or hard candy. · Do not smoke. Avoid secondhand smoke. If you need help quitting, talk to your doctor about stop-smoking programs and medicines. These can increase your chances of quitting for good. When should you call for help? Call 911 anytime you think you may need emergency care. For example, call if:  · You have severe trouble breathing. Call your doctor now or seek immediate medical care if:  · You cough up blood. · You have new or worse trouble breathing. · You have a new or higher fever. · You have a new rash.   Watch closely for changes in your health, and be sure to contact your doctor if:  · You cough more deeply or more often, especially if you notice more mucus or a change in the color of your mucus. · You have new symptoms, such as a sore throat, an earache, or sinus pain. · You do not get better as expected. Where can you learn more? Go to http://joao-ladi.info/. Enter D279 in the search box to learn more about \"Cough: Care Instructions. \"  Current as of: March 25, 2017  Content Version: 11.3  © 1144-0092 [employer]. Care instructions adapted under license by [employer] (which disclaims liability or warranty for this information). If you have questions about a medical condition or this instruction, always ask your healthcare professional. [place] [address] any warranty or liability for your use of this information. Learning About Benefits From Quitting Smoking  How does quitting smoking make you healthier? If you're thinking about quitting smoking, you may have a few reasons to be smoke-free. Your health may be one of them. · When you quit smoking, you lower your risks for cancer, lung disease, heart attack, stroke, blood vessel disease, and blindness from macular degeneration. · When you're smoke-free, you get sick less often, and you heal faster. You are less likely to get colds, flu, bronchitis, and pneumonia. · As a nonsmoker, you may find that your mood is better and you are less stressed. When and how will you feel healthier? Quitting has real health benefits that start from day 1 of being smoke-free. And the longer you stay smoke-free, the healthier you get and the better you feel. The first hours  · After just 20 minutes, your blood pressure and heart rate go down. That means there's less stress on your heart and blood vessels. · Within 12 hours, the level of carbon monoxide in your blood drops back to normal. That makes room for more oxygen. With more oxygen in your body, you may notice that you have more energy than when you smoked. After 2 weeks  · Your lungs start to work better.   · Your risk of heart attack starts to drop. After 1 month  · When your lungs are clear, you cough less and breathe deeper, so it's easier to be active. · Your sense of taste and smell return. That means you can enjoy food more than you have since you started smoking. Over the years  · After 1 year, your risk of heart disease is half what it would be if you kept smoking. · After 5 years, your risk of stroke starts to shrink. Within a few years after that, it's about the same as if you'd never smoked. · After 10 years, your risk of dying from lung cancer is cut by about half. And your risk for many other types of cancer is lower too. How would quitting help others in your life? When you quit smoking, you improve the health of everyone who now breathes in your smoke. · Their heart, lung, and cancer risks drop, much like yours. · They are sick less. For babies and small children, living smoke-free means they're less likely to have ear infections, pneumonia, and bronchitis. · If you're a woman who is or will be pregnant someday, quitting smoking means a healthier . · Children who are close to you are less likely to become adult smokers. Where can you learn more? Go to http://joao-ladi.info/. Enter 052 [id number] 72 11 in the search box to learn more about \"Learning About Benefits From Quitting Smoking. \"  Current as of: 2017  Content Version: 11.3  © 4728-8746 [employer]. Care instructions adapted under license by [employer] (which disclaims liability or warranty for this information). If you have questions about a medical condition or this instruction, always ask your healthcare professional. [place] [address] any warranty or liability for your use of this information.

## 2017-09-09 NOTE — ED PROVIDER NOTES
HPI Comments: Jovon Mcdowell is a 40 y.o. female with pertinent PMHx of bronchitis, HTN, and asthma who presents ambulatory to ED c/o constant dry cough with associated chills x 3 days. Pt reports feeling discomfort after coughing, so she used her albuterol treatment at ~2:00AM with mild relief. Pt has additionally tried mucinex, home nebulizer treatments and arielle seltzer cold and flu over the past 3 days with no relief of her sxs. She states that she is unsure if she has been having a fever, because she does not own a thermometer, and reports feeling a mixture of hot and cold. She states that she is an active smoker, however mentions that she is trying to cut back on the cigarettes. Pt denies any fever, rhinorrhea, ear pain, sore throat or N/V/D.    PCP:  None    Social Hx:  tobacco use (+, 1ppd), EtOH use (-)    There are no other complaints, changes or physical findings at this time. The history is provided by the patient. No  was used. Past Medical History:   Diagnosis Date    Arthritis     Asthma     bronchitis    Bronchitis     HTN, goal below 140/90 2012    Psychiatric disorder     bipolar       Past Surgical History:   Procedure Laterality Date    DELIVERY       HX HEENT      4 wisdom teeth removed in 2011    HX TUBAL LIGATION      c section         Family History:   Problem Relation Age of Onset    Hypertension Father     Psychiatric Disorder Father      schzophrenia    Asthma Son     Asthma Son     Asthma Daughter        Social History     Social History    Marital status: SINGLE     Spouse name: N/A    Number of children: N/A    Years of education: N/A     Occupational History    Not on file.      Social History Main Topics    Smoking status: Current Every Day Smoker     Packs/day: 1.00     Years: 12.00    Smokeless tobacco: Never Used    Alcohol use No    Drug use: No    Sexual activity: Yes     Partners: Male     Birth control/ protection: Condom     Other Topics Concern    Not on file     Social History Narrative         ALLERGIES: Codeine; Tessalon [benzonatate]; and Tramadol    Review of Systems   Constitutional: Positive for chills. Negative for activity change, appetite change, fever and unexpected weight change. HENT: Negative for congestion, ear pain, rhinorrhea and sore throat. Eyes: Negative for pain and visual disturbance. Respiratory: Positive for cough. Negative for shortness of breath. Cardiovascular: Negative for chest pain. Gastrointestinal: Negative for abdominal pain, diarrhea, nausea and vomiting. Genitourinary: Negative for dysuria. Musculoskeletal: Negative for back pain. Skin: Negative for rash. Neurological: Negative for headaches. Patient Vitals for the past 12 hrs:   Temp Pulse Resp BP SpO2   09/09/17 0305 98.7 °F (37.1 °C) 65 18 132/90 99 %     Physical Exam   Constitutional: She is oriented to person, place, and time. She appears well-developed and well-nourished. Smells of smoke. HENT:   Head: Normocephalic and atraumatic. Mouth/Throat: Oropharynx is clear and moist.   Ear effusions BL. Eyes: Conjunctivae and EOM are normal. Pupils are equal, round, and reactive to light. Right eye exhibits no discharge. Left eye exhibits no discharge. Neck: Normal range of motion. Neck supple. Cardiovascular: Normal rate and normal heart sounds. No murmur heard. Pulmonary/Chest: Effort normal and breath sounds normal. No respiratory distress. She has no wheezes. She has no rales. Abdominal: Soft. Bowel sounds are normal. She exhibits no distension. There is no tenderness. Musculoskeletal: Normal range of motion. She exhibits no edema. No edema. Neurological: She is alert and oriented to person, place, and time. No cranial nerve deficit. She exhibits normal muscle tone. Skin: Skin is warm and dry. No rash noted. She is not diaphoretic. Nursing note and vitals reviewed. MDM  Number of Diagnoses or Management Options  Cough:   Middle ear effusion, right:   Diagnosis management comments: Well appearing young female without evidence of pneumonia, PE, sepsis. Amount and/or Complexity of Data Reviewed  Review and summarize past medical records: yes    Patient Progress  Patient progress: stable    ED Course       Procedures   5:09 AM  Pt given the option to be d/c home without a CXR as she has been waiting 1.5 hours to get the XR taken. Pt agrees to be d/c home, stating that she does not want to stay any longer but that she has to have a z-pac as well as the \"special tussinex\" that her doctor gives her because it is the only cough medicine that works for her symptoms. Also states she will not get better without a z-pac. Written by Michelle Cazares ED Scribe as dictated by Madeyln Tomlinson MD    DISCHARGE NOTE:    IMPRESSION:  1. Cough    2. Middle ear effusion, right        PLAN:  Current Discharge Medication List      START taking these medications    Details   predniSONE (DELTASONE) 10 mg tablet Take 3 Tabs by mouth daily (with breakfast) for 3 days. Qty: 9 Tab, Refills: 0      azithromycin (ZITHROMAX Z-USAMA) 250 mg tablet Take 2 tabs day 1, then 1 tab daily  Qty: 6 Tab, Refills: 0      nicotine (NICODERM CQ) 21 mg/24 hr 1 Patch by TransDERmal route every twenty-four (24) hours for 30 days. Qty: 30 Patch, Refills: 0         CONTINUE these medications which have CHANGED    Details   guaiFENesin-dextromethorphan (ADULT TUSSIN COUGH CONGEST DM)  mg/5 mL liqd Take 10 mL by mouth every four (4) hours as needed. Qty: 355 mL, Refills: 0         STOP taking these medications       predniSONE (STERAPRED) 5 mg dose pack Comments:   Reason for Stopping:              Follow-up Information     Follow up With Details Comments Contact Info    [facility] EMERGENCY DEPT  If symptoms worsen [address] [place] [address]  [phone]        Return to ED if worse DISCHARGE NOTE:  5:08 AM  Pt has been reexamined. Pt has no new complaints, changes, or physical findings. Care plan outlined and precautions discussed. All available results reviewed with pt. All medications reviewed with pt. All of pts questions and concerns addressed. Pt agrees to f/u as instructed and agrees to return to ED upon further deterioration. Pt is ready to go home. Written by Fadia Mathew ED Scribe as dictated by Gina Castro MD    ATTESTATION   This note is prepared by Fadia Mathew and Alanis Scruggs acting as scribe for MD Gina Becker MD : The scribe's documentation has been prepared under my direction and personally reviewed by me in its entirety. I confirm that the note above accurately reflects all work, treatment, procedures, and medical decision making performed by me.

## 2017-09-24 ENCOUNTER — HOSPITAL ENCOUNTER (EMERGENCY)
Age: 37
Discharge: HOME | End: 2017-09-24
Attending: EMERGENCY MEDICINE
Payer: MEDICAID

## 2017-09-24 ENCOUNTER — APPOINTMENT (OUTPATIENT)
Dept: GENERAL RADIOLOGY | Age: 37
End: 2017-09-24
Attending: EMERGENCY MEDICINE
Payer: MEDICAID

## 2017-09-24 VITALS
SYSTOLIC BLOOD PRESSURE: 121 MMHG | HEART RATE: 75 BPM | DIASTOLIC BLOOD PRESSURE: 94 MMHG | OXYGEN SATURATION: 97 % | BODY MASS INDEX: 42.8 KG/M2 | RESPIRATION RATE: 20 BRPM | TEMPERATURE: 98.3 F | WEIGHT: 266.32 LBS | HEIGHT: 66 IN

## 2017-09-24 DIAGNOSIS — J45.41 MODERATE PERSISTENT ASTHMA WITH ACUTE EXACERBATION: Primary | ICD-10-CM

## 2017-09-24 DIAGNOSIS — J30.89 NON-SEASONAL ALLERGIC RHINITIS, UNSPECIFIED ALLERGIC RHINITIS TRIGGER: ICD-10-CM

## 2017-09-24 PROCEDURE — 74011000250 HC RX REV CODE- 250: Performed by: EMERGENCY MEDICINE

## 2017-09-24 PROCEDURE — 71020 XR CHEST PA LAT: CPT

## 2017-09-24 PROCEDURE — 77030029684 HC NEB SM VOL KT MONA -A

## 2017-09-24 PROCEDURE — 94640 AIRWAY INHALATION TREATMENT: CPT

## 2017-09-24 PROCEDURE — 99283 EMERGENCY DEPT VISIT LOW MDM: CPT

## 2017-09-24 PROCEDURE — 74011250637 HC RX REV CODE- 250/637: Performed by: EMERGENCY MEDICINE

## 2017-09-24 RX ORDER — SODIUM CHLORIDE 0.9 % (FLUSH) 0.9 %
5-10 SYRINGE (ML) INJECTION AS NEEDED
Status: DISCONTINUED | OUTPATIENT
Start: 2017-09-24 | End: 2017-09-24 | Stop reason: HOSPADM

## 2017-09-24 RX ORDER — HYDROCODONE POLISTIREX AND CHLORPHENIRAMINE POLISTIREX 10; 8 MG/5ML; MG/5ML
5 SUSPENSION, EXTENDED RELEASE ORAL
Qty: 60 ML | Refills: 0 | Status: SHIPPED | OUTPATIENT
Start: 2017-09-24 | End: 2017-11-12

## 2017-09-24 RX ORDER — OXYMETAZOLINE HCL 0.05 %
2 SPRAY, NON-AEROSOL (ML) NASAL 2 TIMES DAILY
Qty: 1 EACH | Refills: 0 | Status: SHIPPED | OUTPATIENT
Start: 2017-09-24 | End: 2017-09-24

## 2017-09-24 RX ORDER — AZITHROMYCIN 500 MG/1
500 TABLET, FILM COATED ORAL DAILY
Qty: 3 TAB | Refills: 0 | Status: SHIPPED | OUTPATIENT
Start: 2017-09-24 | End: 2017-09-24

## 2017-09-24 RX ORDER — ALBUTEROL SULFATE 0.83 MG/ML
5 SOLUTION RESPIRATORY (INHALATION)
Status: COMPLETED | OUTPATIENT
Start: 2017-09-24 | End: 2017-09-24

## 2017-09-24 RX ORDER — AZITHROMYCIN 500 MG/1
500 TABLET, FILM COATED ORAL DAILY
Qty: 3 TAB | Refills: 0 | Status: SHIPPED | OUTPATIENT
Start: 2017-09-24 | End: 2017-11-12

## 2017-09-24 RX ORDER — OXYMETAZOLINE HCL 0.05 %
2 SPRAY, NON-AEROSOL (ML) NASAL 2 TIMES DAILY
Qty: 1 EACH | Refills: 0 | Status: SHIPPED | OUTPATIENT
Start: 2017-09-24 | End: 2017-09-27

## 2017-09-24 RX ORDER — GUAIFENESIN 100 MG/5ML
200 SOLUTION ORAL
Qty: 236 ML | Refills: 0 | Status: SHIPPED | OUTPATIENT
Start: 2017-09-24 | End: 2017-09-24

## 2017-09-24 RX ORDER — GUAIFENESIN 100 MG/5ML
200 SOLUTION ORAL
Qty: 118 ML | Refills: 0 | Status: SHIPPED | OUTPATIENT
Start: 2017-09-24 | End: 2017-09-24

## 2017-09-24 RX ORDER — PREDNISONE 20 MG/1
60 TABLET ORAL
Status: DISCONTINUED | OUTPATIENT
Start: 2017-09-24 | End: 2017-09-24 | Stop reason: HOSPADM

## 2017-09-24 RX ORDER — PREDNISONE 50 MG/1
50 TABLET ORAL DAILY
Qty: 3 TAB | Refills: 0 | Status: SHIPPED | OUTPATIENT
Start: 2017-09-24 | End: 2017-09-27

## 2017-09-24 RX ORDER — PREDNISONE 50 MG/1
50 TABLET ORAL DAILY
Qty: 3 TAB | Refills: 0 | Status: SHIPPED | OUTPATIENT
Start: 2017-09-24 | End: 2017-09-24

## 2017-09-24 RX ORDER — AZITHROMYCIN 250 MG/1
500 TABLET, FILM COATED ORAL
Status: COMPLETED | OUTPATIENT
Start: 2017-09-24 | End: 2017-09-24

## 2017-09-24 RX ADMIN — AZITHROMYCIN 500 MG: 250 TABLET, FILM COATED ORAL at 07:32

## 2017-09-24 RX ADMIN — ALBUTEROL SULFATE 5 MG: 2.5 SOLUTION RESPIRATORY (INHALATION) at 07:33

## 2017-09-24 NOTE — ED NOTES
Assumed care of patient. Patient is alert and oriented, does not appear to be in distress. Patient ambulatory to ED with c/o cough and chest congestion x 4 days. Pt states \"It's my bronchitis. \"

## 2017-09-24 NOTE — DISCHARGE INSTRUCTIONS

## 2017-09-24 NOTE — ED PROVIDER NOTES
[facility]  EMERGENCY DEPARTMENT HISTORY AND PHYSICAL EXAM       Date of Service: 2017   Patient Name: Janie Harada   YOB: 1980  Medical Record Number: [id number]    History of Presenting Illness     Chief Complaint   Patient presents with    Cough     with congestion & aching all over & was treated last pm but stay for d/c        History Provided By:  patient    Additional History:   Janie Harada is a 40 y.o. female with PMHx significant for HTN and asthma who presents ambulatory to the ED with cc of a dry cough, nasal congestion, and generalized body aches x 4 days. She states that she was evaluated for these symptoms [place] ED, and she left prior to receiving her discharge papers. She states that she was diagnosed with acute bronchitis, and she reports that she has a history of similar symptoms. She notes that these symptoms are often exacerbated with seasonal changes. She states that she is currently prescribed Prednisone 50 mg and had a breathing treatment 2 hours ago with no relief. She notes that she has a PCP appointment 10/21/17. She denies a history of PE/DVT or recent long travel. She specifically denies chest pain or leg swelling. Social Hx: + Tobacco (1 PPD), - EtOH, - Illicit Drugs    There are no other complaints, changes or physical findings at this time.     Primary Care Provider: None   Specialist: N/A    Past History     Past Medical History:   Past Medical History:   Diagnosis Date    Arthritis     Asthma     bronchitis    Bronchitis     HTN, goal below 140/90 2012    Psychiatric disorder     bipolar        Past Surgical History:   Past Surgical History:   Procedure Laterality Date    DELIVERY       HX HEENT      4 wisdom teeth removed in 2011    HX TUBAL LIGATION      c section        Family History:   Family History   Problem Relation Age of Onset    Hypertension Father    Davina Rivera Psychiatric Disorder Father      schzophrenia    Asthma Son     Asthma Son     Asthma Daughter         Social History:   Social History   Substance Use Topics    Smoking status: Current Every Day Smoker     Packs/day: 1.00     Years: 12.00    Smokeless tobacco: Never Used    Alcohol use No        Allergies: Allergies   Allergen Reactions    Codeine Nausea and Vomiting     Pt denies this allergy    Tessalon [Benzonatate] Other (comments)     Sneezing    Tramadol Rash         Review of Systems   Review of Systems   Constitutional: Negative. Negative for chills and fever. HENT: Positive for congestion. Negative for facial swelling, rhinorrhea, sore throat, trouble swallowing and voice change. Respiratory: Positive for cough. Negative for apnea, chest tightness and shortness of breath. Cardiovascular: Negative for chest pain, palpitations and leg swelling. Gastrointestinal: Negative. Negative for abdominal distention, abdominal pain, constipation, diarrhea, nausea and vomiting. Genitourinary: Negative. Negative for difficulty urinating, dysuria, frequency, hematuria and urgency. Musculoskeletal: Positive for myalgias. Negative for arthralgias and back pain. Skin: Negative for rash. Neurological: Negative for dizziness, facial asymmetry, weakness, light-headedness, numbness and headaches. Psychiatric/Behavioral: Negative. Physical Exam  Physical Exam   Constitutional: She is oriented to person, place, and time. She appears well-developed and well-nourished. Neck: Normal range of motion. Cardiovascular: Normal rate, regular rhythm, normal heart sounds and intact distal pulses. Pulmonary/Chest: Effort normal. She has wheezes. Fine wheezes BL. Able to speak in full sentences. No retractions. Abdominal: Soft. Bowel sounds are normal.   Neurological: She is alert and oriented to person, place, and time. Skin: Skin is warm and dry.    Nursing note and vitals reviewed. Medical Decision Making   I am the first provider for this patient. I reviewed the vital signs, available nursing notes, past medical history, past surgical history, family history and social history. Old Medical Records: Old medical records. Nursing notes. Provider Notes:   DDx: Acute asthma exacerbation, acute bronchitis, PNA. 40 y.o. F who is a chronic smoker with hx of asthma presents with SOB with wheezes. Will treat serial duo-nebs and obtain cxray and labs and re-assess. ED Course:  7:38 AM   Initial assessment performed. The patients presenting problems have been discussed, and they are in agreement with the care plan formulated and outlined with them. I have encouraged them to ask questions as they arise throughout their visit. Progress Note:  8:12 AM  The patient was re-assessed, and she states that she feels significant improvement after her breathing treatment. Vitals are stable. Will discharge home with Prednisone burst, Z-pack, and Afrin. Written by Reddy Cartagena ED Scribe, as dictated by Leonarda Yao MD.    Diagnostic Study Results     Radiologic Studies -  The following have been ordered and reviewed:  CXR Results  (Last 48 hours)               09/24/17 0751  XR CHEST PA LAT Final result    Impression:  IMPRESSION:   No acute process. Narrative:  INDICATION:   dyspnea; cough       COMPARISON: May 24, 2017       FINDINGS:       Frontal and lateral views of the chest demonstrate a normal cardiomediastinal   silhouette. The lungs are adequately expanded. There is no edema, effusion,   consolidation, or pneumothorax. The osseous structures are unremarkable. Vital Signs-Reviewed the patient's vital signs.    Patient Vitals for the past 12 hrs:   Temp Pulse Resp BP SpO2   09/24/17 0748 - 75 - (!) 121/94 97 %   09/24/17 0714 98.3 °F (36.8 °C) 66 20 (!) 137/97 99 %       Medications Given in the ED:  Medications   sodium chloride (NS) flush 5-10 mL (not administered)   predniSONE (DELTASONE) tablet 60 mg (60 mg Oral Refused 9/24/17 0732)   albuterol (PROVENTIL VENTOLIN) nebulizer solution 5 mg (5 mg Nebulization Given 9/24/17 0733)   azithromycin (ZITHROMAX) tablet 500 mg (500 mg Oral Given 9/24/17 0732)     Diagnosis   Clinical Impression:   1. Moderate persistent asthma with acute exacerbation    2. Non-seasonal allergic rhinitis, unspecified allergic rhinitis trigger         Plan:  1:   Follow-up Information     Follow up With Details Comments Contact Info    None  As needed, If symptoms worsen None ([phone]) Patient stated that they have no PCP      [facility] EM[facility]  As needed, If symptoms worsen [address] [place] [address]  [phone]        2:   Current Discharge Medication List      START taking these medications    Details   predniSONE (DELTASONE) 50 mg tablet Take 1 Tab by mouth daily for 3 days. Qty: 3 Tab, Refills: 0      oxymetazoline (AFRIN, OXYMETAZOLINE,) 0.05 % nasal spray 2 Sprays by Both Nostrils route two (2) times a day for 3 days. Qty: 1 Each, Refills: 0      guaiFENesin (TUSSIN EXPECTORANT) 100 mg/5 mL liquid Take 10 mL by mouth three (3) times daily as needed for Cough. Qty: 236 mL, Refills: 0      !! azithromycin (ZITHROMAX TRI-USAMA) 500 mg tab Take 1 Tab by mouth daily. Take one tablet daily for 3 days  Qty: 3 Tab, Refills: 0       !! - Potential duplicate medications found. Please discuss with provider. CONTINUE these medications which have NOT CHANGED    Details   !! azithromycin (ZITHROMAX Z-USAMA) 250 mg tablet Take 2 tabs day 1, then 1 tab daily  Qty: 6 Tab, Refills: 0      nicotine (NICODERM CQ) 21 mg/24 hr 1 Patch by TransDERmal route every twenty-four (24) hours for 30 days. Qty: 30 Patch, Refills: 0      chlorpheniramine-HYDROcodone (TUSSIONEX PENNKINETIC ER) 10-8 mg/5 mL suspension Take 5 mL by mouth every twelve (12) hours as needed for Cough. Max Daily Amount: 10 mL.   Qty: 50 mL, Refills: 0 albuterol (PROVENTIL HFA, VENTOLIN HFA, PROAIR HFA) 90 mcg/actuation inhaler Take 2 Puffs by inhalation every four (4) hours as needed for Wheezing. Qty: 1 Inhaler, Refills: 0      hydroCHLOROthiazide (MICROZIDE) 12.5 mg capsule Take 1 Cap by mouth daily. Qty: 30 Cap, Refills: 0      triamcinolone (NASACORT) 55 mcg nasal inhaler 2 Sprays by Both Nostrils route daily. Qty: 1 Bottle, Refills: 0       !! - Potential duplicate medications found. Please discuss with provider. Return to ED if Worse    Disposition Note:  Discharge Note:  8:21 AM  The pt is ready for discharge. The pt's signs, symptoms, diagnosis, and discharge instructions have been discussed and pt has conveyed their understanding. The pt is to follow up as recommended or return to ER should their symptoms worsen. Plan has been discussed and pt is in agreement. Attestations:     Alyx Paige, attest that this documentation has been prepared under the direction and in the presence of Luh Solorzano MD.  9/24/2017 7:38 AM    I, Luh Solorzano MD, personally performed the services described in this documentation. All medical record entries made by the scribe were at my direction and in my presence. I have reviewed the chart and discharge instructions and agree that the record reflects my personal performance and is accurate and complete.

## 2017-09-24 NOTE — ED NOTES
Discharge instructions reviewed with patient, copy given by Dr. Fernanda Marin. Pt is accomponied by family, denies use of wheelchair.

## 2017-11-12 ENCOUNTER — HOSPITAL ENCOUNTER (EMERGENCY)
Age: 37
Discharge: HOME | End: 2017-11-12
Attending: EMERGENCY MEDICINE
Payer: MEDICAID

## 2017-11-12 ENCOUNTER — APPOINTMENT (OUTPATIENT)
Dept: GENERAL RADIOLOGY | Age: 37
End: 2017-11-12
Attending: EMERGENCY MEDICINE
Payer: MEDICAID

## 2017-11-12 VITALS
DIASTOLIC BLOOD PRESSURE: 106 MMHG | SYSTOLIC BLOOD PRESSURE: 158 MMHG | BODY MASS INDEX: 41.77 KG/M2 | HEART RATE: 72 BPM | RESPIRATION RATE: 16 BRPM | OXYGEN SATURATION: 100 % | TEMPERATURE: 97.8 F | HEIGHT: 66 IN | WEIGHT: 259.92 LBS

## 2017-11-12 DIAGNOSIS — J20.9 ACUTE BRONCHITIS, UNSPECIFIED ORGANISM: Primary | ICD-10-CM

## 2017-11-12 DIAGNOSIS — Z72.0 TOBACCO ABUSE: ICD-10-CM

## 2017-11-12 PROCEDURE — 71020 XR CHEST PA LAT: CPT

## 2017-11-12 PROCEDURE — 99282 EMERGENCY DEPT VISIT SF MDM: CPT

## 2017-11-12 RX ORDER — HYDROCODONE POLISTIREX AND CHLORPHENIRAMINE POLISTIREX 10; 8 MG/5ML; MG/5ML
5 SUSPENSION, EXTENDED RELEASE ORAL
Qty: 60 ML | Refills: 0 | Status: SHIPPED | OUTPATIENT
Start: 2017-11-12 | End: 2017-11-26

## 2017-11-12 RX ORDER — AZITHROMYCIN 250 MG/1
TABLET, FILM COATED ORAL
Qty: 6 TAB | Refills: 0 | Status: SHIPPED | OUTPATIENT
Start: 2017-11-12 | End: 2017-11-26

## 2017-11-12 NOTE — Clinical Note
Thank you! Thank you for allowing us to provide you with excellent care today. We hope we addressed all of your concerns and needs. We strive to provide excellent quality care in the Emergency Department. You will receive a survey after your visit to  evaluate the care you were provided. Please rate us a level 5 (excellent), as anything less than excellent does not meet our goals. If you feel that you have not received excellent quality care or timely care, please ask to speak to the nurse man ager. Please choose us in the future for your continued health care needs. ------------------------------------------------------------------------------------------------------------ The exam and treatment you received in the Emergency Department wer e for an urgent problem and are not intended as complete care. It is important that you follow up with a doctor, nurse practitioner, or physician assistant for ongoing care. If your symptoms become worse or you do not improve as expected and you are unab le to reach your usual health care provider, you should return to the Emergency Department. We are available 24 hours a day. Please take your discharge instructions with you when you go to your follow-up appointment. If you have any problem arrang ing a follow-up appointment, contact the Emergency Department immediately. If a prescription has been provided, please have it filled as soon as possible to prevent a delay in treatment. Read the entire medication instruction sheet provided to you by  the pharmacy. If you have any questions or reservations about taking the medication due to side effects or interactions with other medications, please call your primary care physician or contact the ER to speak with the charge nurse.   
 
Make an appointm ent with your family doctor or the physician you were referred to for follow-up of this visit as instructed on your discharge paperwork, as this is mandatory follow-up. Return to the ER if you are unable to be seen or if you are unable to be seen in a ti cr manner. If you have any problem arranging the follow-up visit, contact the Emergency Department immediately.
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
## 2017-11-12 NOTE — ED NOTES
ED physician at bedside to provide discharge paperwork. Vital signs stable. Pt in no apparent distress at this time. Mental status at baseline. Ambulatory to waiting room with steady gate, discharge paperwork in hand.

## 2017-11-12 NOTE — DISCHARGE INSTRUCTIONS
Stopping Smoking: Care Instructions  Your Care Instructions    Cigarette smokers crave the nicotine in cigarettes. Giving it up is much harder than simply changing a habit. Your body has to stop craving the nicotine. It is hard to quit, but you can do it. There are many tools that people use to quit smoking. You may find that combining tools works best for you. There are several steps to quitting. First you get ready to quit. Then you get support to help you. After that, you learn new skills and behaviors to become a nonsmoker. For many people, a necessary step is getting and using medicine. Your doctor will help you set up the plan that best meets your needs. You may want to attend a smoking cessation program to help you quit smoking. When you choose a program, look for one that has proven success. Ask your doctor for ideas. You will greatly increase your chances of success if you take medicine as well as get counseling or join a cessation program.  Some of the changes you feel when you first quit tobacco are uncomfortable. Your body will miss the nicotine at first, and you may feel short-tempered and grumpy. You may have trouble sleeping or concentrating. Medicine can help you deal with these symptoms. You may struggle with changing your smoking habits and rituals. The last step is the tricky one: Be prepared for the smoking urge to continue for a time. This is a lot to deal with, but keep at it. You will feel better. Follow-up care is a key part of your treatment and safety. Be sure to make and go to all appointments, and call your doctor if you are having problems. It's also a good idea to know your test results and keep a list of the medicines you take. How can you care for yourself at home? · Ask your family, friends, and coworkers for support. You have a better chance of quitting if you have help and support.   · Join a support group, such as Nicotine Anonymous, for people who are trying to quit smoking. · Consider signing up for a smoking cessation program, such as the American [employer] Association's Freedom from Smoking program.  · Set a quit date. Pick your date carefully so that it is not right in the middle of a big deadline or stressful time. Once you quit, do not even take a puff. Get rid of all ashtrays and lighters after your last cigarette. Clean your house and your clothes so that they do not smell of smoke. · Learn how to be a nonsmoker. Think about ways you can avoid those things that make you reach for a cigarette. ¨ Avoid situations that put you at greatest risk for smoking. For some people, it is hard to have a drink with friends without smoking. For others, they might skip a coffee break with coworkers who smoke. ¨ Change your daily routine. Take a different route to work or eat a meal in a different place. · Cut down on stress. Calm yourself or release tension by doing an activity you enjoy, such as reading a book, taking a hot bath, or gardening. · Talk to your doctor or pharmacist about nicotine replacement therapy, which replaces the nicotine in your body. You still get nicotine but you do not use tobacco. Nicotine replacement products help you slowly reduce the amount of nicotine you need. These products come in several forms, many of them available over-the-counter:  ¨ Nicotine patches  ¨ Nicotine gum and lozenges  ¨ Nicotine inhaler  · Ask your doctor about bupropion (Wellbutrin) or varenicline (Chantix), which are prescription medicines. They do not contain nicotine. They help you by reducing withdrawal symptoms, such as stress and anxiety. · Some people find hypnosis, acupuncture, and massage helpful for ending the smoking habit. · Eat a healthy diet and get regular exercise. Having healthy habits will help your body move past its craving for nicotine. · Be prepared to keep trying. Most people are not successful the first few times they try to quit.  Do not get mad at yourself if you smoke again. Make a list of things you learned and think about when you want to try again, such as next week, next month, or next year. Where can you learn more? Go to http://joao-ladi.info/. Enter L417 in the search box to learn more about \"Stopping Smoking: Care Instructions. \"  Current as of: March 20, 2017  Content Version: 11.4  © 7991-1956 [employer]. Care instructions adapted under license by [employer] (which disclaims liability or warranty for this information). If you have questions about a medical condition or this instruction, always ask your healthcare professional. [address] any warranty or liability for your use of this information. Bronchitis: Care Instructions  Your Care Instructions    Bronchitis is inflammation of the bronchial tubes, which carry air to the lungs. The tubes swell and produce mucus, or phlegm. The mucus and inflamed bronchial tubes make you cough. You may have trouble breathing. Most cases of bronchitis are caused by viruses like those that cause colds. Antibiotics usually do not help and they may be harmful. Bronchitis usually develops rapidly and lasts about 2 to 3 weeks in otherwise healthy people. Follow-up care is a key part of your treatment and safety. Be sure to make and go to all appointments, and call your doctor if you are having problems. It's also a good idea to know your test results and keep a list of the medicines you take. How can you care for yourself at home? · Take all medicines exactly as prescribed. Call your doctor if you think you are having a problem with your medicine. · Get some extra rest.  · Take an over-the-counter pain medicine, such as acetaminophen (Tylenol), ibuprofen (Advil, Motrin), or naproxen (Aleve) to reduce fever and relieve body aches. Read and follow all instructions on the label.   · Do not take two or more pain medicines at the same time unless the doctor told you to. Many pain medicines have acetaminophen, which is Tylenol. Too much acetaminophen (Tylenol) can be harmful. · Take an over-the-counter cough medicine that contains dextromethorphan to help quiet a dry, hacking cough so that you can sleep. Avoid cough medicines that have more than one active ingredient. Read and follow all instructions on the label. · Breathe moist air from a humidifier, hot shower, or sink filled with hot water. The heat and moisture will thin mucus so you can cough it out. · Do not smoke. Smoking can make bronchitis worse. If you need help quitting, talk to your doctor about stop-smoking programs and medicines. These can increase your chances of quitting for good. When should you call for help? Call 911 anytime you think you may need emergency care. For example, call if:  ? · You have severe trouble breathing. ?Call your doctor now or seek immediate medical care if:  ? · You have new or worse trouble breathing. ? · You cough up dark brown or bloody mucus (sputum). ? · You have a new or higher fever. ? · You have a new rash. ? Watch closely for changes in your health, and be sure to contact your doctor if:  ? · You cough more deeply or more often, especially if you notice more mucus or a change in the color of your mucus. ? · You are not getting better as expected. Where can you learn more? Go to http://joao-ladi.info/. Enter H333 in the search box to learn more about \"Bronchitis: Care Instructions. \"  Current as of: May 12, 2017  Content Version: 11.4  © 1904-9400 [employer]. Care instructions adapted under license by [employer] (which disclaims liability or warranty for this information). If you have questions about a medical condition or this instruction, always ask your healthcare professional. [address] any warranty or liability for your use of this information.

## 2017-11-26 ENCOUNTER — APPOINTMENT (OUTPATIENT)
Dept: GENERAL RADIOLOGY | Age: 37
End: 2017-11-26
Attending: PHYSICIAN ASSISTANT
Payer: MEDICAID

## 2017-11-26 ENCOUNTER — HOSPITAL ENCOUNTER (EMERGENCY)
Age: 37
Discharge: HOME | End: 2017-11-26
Attending: EMERGENCY MEDICINE
Payer: MEDICAID

## 2017-11-26 VITALS
OXYGEN SATURATION: 99 % | DIASTOLIC BLOOD PRESSURE: 126 MMHG | WEIGHT: 258.82 LBS | HEIGHT: 65 IN | BODY MASS INDEX: 43.12 KG/M2 | HEART RATE: 76 BPM | TEMPERATURE: 97.8 F | SYSTOLIC BLOOD PRESSURE: 193 MMHG | RESPIRATION RATE: 14 BRPM

## 2017-11-26 DIAGNOSIS — J06.9 ACUTE UPPER RESPIRATORY INFECTION: ICD-10-CM

## 2017-11-26 DIAGNOSIS — L73.9 FOLLICULITIS: ICD-10-CM

## 2017-11-26 DIAGNOSIS — R05.9 COUGH: Primary | ICD-10-CM

## 2017-11-26 PROCEDURE — 99282 EMERGENCY DEPT VISIT SF MDM: CPT

## 2017-11-26 PROCEDURE — 71020 XR CHEST PA LAT: CPT

## 2017-11-26 RX ORDER — HYDROCODONE BITARTRATE AND HOMATROPINE METHYLBROMIDE 1.5; 5 MG/5ML; MG/5ML
5 SYRUP ORAL 4 TIMES DAILY
Qty: 120 ML | Refills: 0 | Status: SHIPPED | OUTPATIENT
Start: 2017-11-26 | End: 2018-02-07

## 2017-11-26 RX ORDER — ALBUTEROL SULFATE 90 UG/1
2 AEROSOL, METERED RESPIRATORY (INHALATION)
Qty: 1 INHALER | Refills: 1 | Status: SHIPPED | OUTPATIENT
Start: 2017-11-26 | End: 2018-04-24

## 2017-11-26 RX ORDER — CEPHALEXIN 500 MG/1
500 CAPSULE ORAL 3 TIMES DAILY
Qty: 21 CAP | Refills: 0 | Status: SHIPPED | OUTPATIENT
Start: 2017-11-26 | End: 2017-12-03

## 2017-11-26 NOTE — DISCHARGE INSTRUCTIONS
Folliculitis: Care Instructions  Your Care Instructions    Folliculitis (say \"suo-ZVY-ddi-LY-tus\") is an infection of the pouches (follicles) in the skin where hair grows. It can occur on any part of the body, but it is most common on the scalp, face, armpits, and groin. Bacteria, such as those found in a hot tub, can cause folliculitis. Folliculitis begins as a red, tender area near a strand of hair. The skin can itch or burn and may drain pus or blood. Sometimes folliculitis can lead to more serious skin infections. Your doctor usually can treat mild folliculitis with an antibiotic cream or ointment. If you have folliculitis on your scalp, you may use a shampoo that kills bacteria. Antibiotics you take as pills can treat infections deeper in the skin. For stubborn cases of folliculitis, laser treatment may be an option. Laser treatment uses strong beams of light to destroy the hair follicle. But hair will no longer grow in the treated area. Follow-up care is a key part of your treatment and safety. Be sure to make and go to all appointments, and call your doctor if you are having problems. It's also a good idea to know your test results and keep a list of the medicines you take. How can you care for yourself at home? · Take your medicine exactly as prescribed. If your doctor prescribed antibiotics, take them as directed. Do not stop taking them just because you feel better. You need to take the full course of antibiotics. · Use a soap that kills bacteria to wash the infected area. If your scalp or beard is infected, use a shampoo with selenium or propylene glycol. Be careful. Do not scrub too long or too hard. · Mix 1 1/3 cup warm water and 1 tablespoon vinegar. Soak a cloth in the mixture, and place it over the infected skin until it cools off (usually 5 to 10 minutes). You can do this 3 to 6 times a day. · Do not share your razor, towel, or washcloth. That can spread folliculitis.   · Use a new blade in your razor each time you shave to keep from re-infecting your skin. · If you tend to get folliculitis, avoid using hot tubs. They can contain bacteria that cause folliculitis. When should you call for help? Call your doctor now or seek immediate medical care if:  ? · You have symptoms of infection, such as:  ¨ Increased pain, swelling, warmth, or redness. ¨ Red streaks leading from the area. ¨ Pus draining from the area. ¨ A fever. ? Watch closely for changes in your health, and be sure to contact your doctor if:  ? · You do not get better as expected. Where can you learn more? Go to http://joao-ladi.info/. Enter M257 in the search box to learn more about \"Folliculitis: Care Instructions. \"  Current as of: October 13, 2016  Content Version: 11.4  © 5781-8811 [employer]. Care instructions adapted under license by [employer] (which disclaims liability or warranty for this information). If you have questions about a medical condition or this instruction, always ask your healthcare professional. [place] [address] any warranty or liability for your use of this information. Cough: Care Instructions  Your Care Instructions    A cough is your body's response to something that bothers your throat or airways. Many things can cause a cough. You might cough because of a cold or the flu, bronchitis, or asthma. Smoking, postnasal drip, allergies, and stomach acid that backs up into your throat also can cause coughs. A cough is a symptom, not a disease. Most coughs stop when the cause, such as a cold, goes away. You can take a few steps at home to cough less and feel better. Follow-up care is a key part of your treatment and safety. Be sure to make and go to all appointments, and call your doctor if you are having problems. It's also a good idea to know your test results and keep a list of the medicines you take.   How can you care for yourself at home? · Drink lots of water and other fluids. This helps thin the mucus and soothes a dry or sore throat. Honey or lemon juice in hot water or tea may ease a dry cough. · Take cough medicine as directed by your doctor. · Prop up your head on pillows to help you breathe and ease a dry cough. · Try cough drops to soothe a dry or sore throat. Cough drops don't stop a cough. Medicine-flavored cough drops are no better than candy-flavored drops or hard candy. · Do not smoke. Avoid secondhand smoke. If you need help quitting, talk to your doctor about stop-smoking programs and medicines. These can increase your chances of quitting for good. When should you call for help? Call 911 anytime you think you may need emergency care. For example, call if:  ? · You have severe trouble breathing. ?Call your doctor now or seek immediate medical care if:  ? · You cough up blood. ? · You have new or worse trouble breathing. ? · You have a new or higher fever. ? · You have a new rash. ? Watch closely for changes in your health, and be sure to contact your doctor if:  ? · You cough more deeply or more often, especially if you notice more mucus or a change in the color of your mucus. ? · You have new symptoms, such as a sore throat, an earache, or sinus pain. ? · You do not get better as expected. Where can you learn more? Go to http://joao-ladi.info/. Enter D279 in the search box to learn more about \"Cough: Care Instructions. \"  Current as of: May 12, 2017  Content Version: 11.4  © 8412-2138 [employer]. Care instructions adapted under license by [employer] (which disclaims liability or warranty for this information). If you have questions about a medical condition or this instruction, always ask your healthcare professional. [place] [address] any warranty or liability for your use of this information.          Upper Respiratory Infection (Cold): Care Instructions  Your Care Instructions    An upper respiratory infection, or URI, is an infection of the nose, sinuses, or throat. URIs are spread by coughs, sneezes, and direct contact. The common cold is the most frequent kind of URI. The flu and sinus infections are other kinds of URIs. Almost all URIs are caused by viruses. Antibiotics won't cure them. But you can treat most infections with home care. This may include drinking lots of fluids and taking over-the-counter pain medicine. You will probably feel better in 4 to 10 days. The doctor has checked you carefully, but problems can develop later. If you notice any problems or new symptoms, get medical treatment right away. Follow-up care is a key part of your treatment and safety. Be sure to make and go to all appointments, and call your doctor if you are having problems. It's also a good idea to know your test results and keep a list of the medicines you take. How can you care for yourself at home? · To prevent dehydration, drink plenty of fluids, enough so that your urine is light yellow or clear like water. Choose water and other caffeine-free clear liquids until you feel better. If you have kidney, heart, or liver disease and have to limit fluids, talk with your doctor before you increase the amount of fluids you drink. · Take an over-the-counter pain medicine, such as acetaminophen (Tylenol), ibuprofen (Advil, Motrin), or naproxen (Aleve). Read and follow all instructions on the label. · Before you use cough and cold medicines, check the label. These medicines may not be safe for young children or for people with certain health problems. · Be careful when taking over-the-counter cold or flu medicines and Tylenol at the same time. Many of these medicines have acetaminophen, which is Tylenol. Read the labels to make sure that you are not taking more than the recommended dose. Too much acetaminophen (Tylenol) can be harmful.   · Get plenty of rest.  · Do not smoke or allow others to smoke around you. If you need help quitting, talk to your doctor about stop-smoking programs and medicines. These can increase your chances of quitting for good. When should you call for help? Call 911 anytime you think you may need emergency care. For example, call if:  ? · You have severe trouble breathing. ?Call your doctor now or seek immediate medical care if:  ? · You seem to be getting much sicker. ? · You have new or worse trouble breathing. ? · You have a new or higher fever. ? · You have a new rash. ? Watch closely for changes in your health, and be sure to contact your doctor if:  ? · You have a new symptom, such as a sore throat, an earache, or sinus pain. ? · You cough more deeply or more often, especially if you notice more mucus or a change in the color of your mucus. ? · You do not get better as expected. Where can you learn more? Go to http://joao-ladi.info/. Enter Y194 in the search box to learn more about \"Upper Respiratory Infection (Cold): Care Instructions. \"  Current as of: May 12, 2017  Content Version: 11.4  © 4263-0109 [employer]. Care instructions adapted under license by [employer] (which disclaims liability or warranty for this information). If you have questions about a medical condition or this instruction, always ask your healthcare professional. [place] [address] any warranty or liability for your use of this information.

## 2017-11-26 NOTE — ED PROVIDER NOTES
[facility]  EMERGENCY DEPARTMENT HISTORY AND PHYSICAL EXAM         Date of Service: 2017   Patient Name: Reyes Anne   YOB: 1980  Medical Record Number: [id number]    History of Presenting Illness     Chief Complaint   Patient presents with    Cough     with congestion x Wed        History Provided By:  patient    Additional History:   Reyes Anne is a 40 y.o. female with PMhx significant for asthma and HTN, who presents ambulatory to the [address] [place] [address] ED with cc of productive cough with yellow sputum x 5 days. Pt reports no relief with OTC medications. She also reports sores in her ear, stating she feels scabs when she feels for them with her fingers. She believes the sores are related to her ear-buds use. Pt specifically denies any fevers, chills, nausea, vomiting, chest pain, shortness of breath, headache, rash, diarrhea, sweating or weight loss. Social Hx: + Tobacco (1 PPD), - EtOH, - Illicit Drugs    There are no other complaints, changes or physical findings at this time. Primary Care Provider: None       Past History     Past Medical History:   Past Medical History:   Diagnosis Date    Arthritis     Asthma     bronchitis    Bronchitis     HTN, goal below 140/90 2012    Psychiatric disorder     bipolar        Past Surgical History:   Past Surgical History:   Procedure Laterality Date    DELIVERY       HX HEENT      4 wisdom teeth removed in 2011    HX TUBAL LIGATION      c section        Family History:   Family History   Problem Relation Age of Onset    Hypertension Father     Psychiatric Disorder Father      schzophrenia    Asthma Son     Asthma Son     Asthma Daughter         Social History:   Social History   Substance Use Topics    Smoking status: Current Every Day Smoker     Packs/day: 1.00     Years: 12.00    Smokeless tobacco: Never Used    Alcohol use No        Allergies:    Allergies   Allergen Reactions  Codeine Nausea and Vomiting     Pt denies this allergy    Tessalon [Benzonatate] Other (comments)     Sneezing    Tramadol Rash        Review of Systems   Review of Systems   Constitutional: Negative. Negative for activity change, appetite change, chills, fatigue, fever and unexpected weight change. HENT: Negative. Negative for congestion, hearing loss, rhinorrhea, sneezing and voice change. Eyes: Negative. Negative for pain and visual disturbance. Respiratory: Positive for cough (yellow sputum). Negative for apnea, choking, chest tightness and shortness of breath. Cardiovascular: Negative. Negative for chest pain and palpitations. Gastrointestinal: Negative. Negative for abdominal distention, abdominal pain, blood in stool, diarrhea, nausea and vomiting. Genitourinary: Negative. Negative for difficulty urinating, flank pain, frequency and urgency. Musculoskeletal: Negative. Negative for arthralgias, back pain, myalgias and neck stiffness. Skin: Negative for color change and rash.        + Sores in BL ear   Neurological: Negative. Negative for dizziness, seizures, syncope, speech difficulty, weakness, numbness and headaches. Hematological: Negative for adenopathy. Psychiatric/Behavioral: Negative. Negative for agitation, behavioral problems, dysphoric mood and suicidal ideas. The patient is not nervous/anxious. Physical Exam  Physical Exam   Constitutional: She is oriented to person, place, and time. She appears well-developed and well-nourished. No distress. HENT:   Head: Normocephalic and atraumatic. Right Ear: External ear normal.   Left Ear: External ear normal.   Nose: Nose normal.   Mouth/Throat: Oropharynx is clear and moist. No oropharyngeal exudate. Dry hacky cough, nasal congestion, and mild irritation to EAC meatus. Eyes: Conjunctivae and EOM are normal. Pupils are equal, round, and reactive to light. Right eye exhibits no discharge.  Left eye exhibits no discharge. No scleral icterus. Neck: Normal range of motion. Neck supple. No JVD present. No tracheal deviation present. Cardiovascular: Normal rate, regular rhythm, normal heart sounds and intact distal pulses. Exam reveals no gallop and no friction rub. No murmur heard. Pulmonary/Chest: Effort normal and breath sounds normal. No respiratory distress. She has no wheezes. She has no rales. She exhibits no tenderness. Abdominal: Soft. Bowel sounds are normal. She exhibits no distension and no mass. There is no tenderness. There is no rebound and no guarding. Musculoskeletal: Normal range of motion. She exhibits no edema or tenderness. Lymphadenopathy:     She has no cervical adenopathy. Neurological: She is alert and oriented to person, place, and time. She has normal reflexes. No cranial nerve deficit. She exhibits normal muscle tone. Coordination normal.   Skin: Skin is warm and dry. She is not diaphoretic. Psychiatric: She has a normal mood and affect. Her behavior is normal. Judgment and thought content normal.   Nursing note and vitals reviewed. Medical Decision Making   I am the first provider for this patient. I reviewed the vital signs, available nursing notes, past medical history, past surgical history, family history and social history. Old Medical Records: Old medical records. Provider Notes:   DDx: URI, bronchitis, PNA. ED Course:  7:12 AM   Initial assessment performed. The patients presenting problems have been discussed, and they are in agreement with the care plan formulated and outlined with them. I have encouraged them to ask questions as they arise throughout their visit. Diagnostic Study Results     Radiologic Studies -  The following have been ordered and reviewed:  CXR Results  (Last 48 hours)               11/26/17 0803  XR CHEST PA LAT Final result    Impression:  IMPRESSION:       Normal chest views. No change.            Narrative:  EXAM:  XR CHEST PA LAT       INDICATION:  Cough and congestion for 4 days. Chronic bronchitis and asthma. COMPARISON: Chest views on 11/12/2017       TECHNIQUE: PA and lateral chest views       FINDINGS: The cardiomediastinal and hilar contours are within normal limits. The   pulmonary vasculature is within normal limits. The lungs and pleural spaces are clear. The visualized bones and upper abdomen   are age-appropriate. Vital Signs-Reviewed the patient's vital signs. Patient Vitals for the past 12 hrs:   Temp Pulse Resp BP SpO2   11/26/17 0715 97.8 °F (36.6 °C) 76 14 (!) 193/126 99 %       Diagnosis:  Clinical Impression:   1. Cough    2. Acute upper respiratory infection    3. Folliculitis         Plan:  1:   Follow-up Information     Follow up With Details Comments Contact Info    None In 2 days  None (395) Patient stated that they have no PCP            2:   Discharge Medication List as of 11/26/2017  8:24 AM      START taking these medications    Details   !! albuterol (PROVENTIL HFA, VENTOLIN HFA, PROAIR HFA) 90 mcg/actuation inhaler Take 2 Puffs by inhalation every four (4) hours as needed for Wheezing., Normal, Disp-1 Inhaler, R-1      HYDROcodone-homatropine (HYCODAN) 5-1.5 mg/5 mL (5 mL) syrup Take 5 mL by mouth four (4) times daily. Max Daily Amount: 20 mL., Print, Disp-120 mL, R-0      cephALEXin (KEFLEX) 500 mg capsule Take 1 Cap by mouth three (3) times daily for 7 days. , Normal, Disp-21 Cap, R-0       !! - Potential duplicate medications found. Please discuss with provider. CONTINUE these medications which have NOT CHANGED    Details   !! albuterol (PROVENTIL HFA, VENTOLIN HFA, PROAIR HFA) 90 mcg/actuation inhaler Take 2 Puffs by inhalation every four (4) hours as needed for Wheezing., Normal, Disp-1 Inhaler, R-0      hydroCHLOROthiazide (MICROZIDE) 12.5 mg capsule Take 1 Cap by mouth daily. , Normal, Disp-30 Cap, R-0       !! - Potential duplicate medications found.  Please discuss with provider. Return to ED if worse. Disposition:  DISCHARGE NOTE  8:24 AM  The patient has been re-evaluated and is ready for discharge. Reviewed available results with patient. Counseled patient on diagnosis and care plan. Patient has expressed understanding, and all questions have been answered. Patient agrees with plan and agrees to follow up as recommended, or return to the ED if their symptoms worsen. Discharge instructions have been provided and explained to the patient, along with reasons to return to the ED.    _______________________________   Attestations: This note is prepared by Sissy Coronado, acting as Scribe for [employer]. The scribe's documentation has been prepared under my direction and personally reviewed by me in its entirety. I confirm that the note above accurately reflects all work, treatment, procedures, and medical decision making performed by me.   ALISHA Castro    _______________________________

## 2017-11-26 NOTE — ED TRIAGE NOTES
Patient presents to ED ambulatory with cough since Wednesday. Patient has been taking guafenesin at home with no relief. She reports that she is unable to sleep at night due to coughing.

## 2017-12-20 ENCOUNTER — HOSPITAL ENCOUNTER (EMERGENCY)
Age: 37
Discharge: HOME | End: 2017-12-21
Attending: EMERGENCY MEDICINE
Payer: MEDICAID

## 2017-12-20 VITALS
SYSTOLIC BLOOD PRESSURE: 147 MMHG | TEMPERATURE: 97.8 F | RESPIRATION RATE: 18 BRPM | WEIGHT: 260 LBS | OXYGEN SATURATION: 97 % | DIASTOLIC BLOOD PRESSURE: 100 MMHG | HEIGHT: 66 IN | BODY MASS INDEX: 41.78 KG/M2 | HEART RATE: 81 BPM

## 2017-12-20 DIAGNOSIS — L02.91 ABSCESS: ICD-10-CM

## 2017-12-20 DIAGNOSIS — L03.111 CELLULITIS OF RIGHT AXILLA: Primary | ICD-10-CM

## 2017-12-20 PROCEDURE — 99282 EMERGENCY DEPT VISIT SF MDM: CPT

## 2017-12-21 RX ORDER — CEPHALEXIN 500 MG/1
500 CAPSULE ORAL 4 TIMES DAILY
Qty: 28 CAP | Refills: 0 | Status: SHIPPED | OUTPATIENT
Start: 2017-12-21 | End: 2017-12-28

## 2017-12-21 RX ORDER — HYDROCODONE BITARTRATE AND ACETAMINOPHEN 5; 325 MG/1; MG/1
1 TABLET ORAL
Qty: 6 TAB | Refills: 0 | Status: SHIPPED | OUTPATIENT
Start: 2017-12-21 | End: 2018-01-16

## 2017-12-21 NOTE — ED NOTES
Pt arrived to ED with c/o boil under R armpit X 2 days. Pt reports it started to drain on the way to the ED. Pt is alert and orientated X 4; boil/abscess noted under R arm; lungs are clear; pt breaths well on room air; Pt is in no acute distress. Will continue to monitor. See nursing assessment. Safety precautions in place; call light within reach. Emergency Department Nursing Plan of Care       The Nursing Plan of Care is developed from the Nursing assessment and Emergency Department Attending provider initial evaluation. The plan of care may be reviewed in the ED Provider note.     The Plan of Care was developed with the following considerations:   Patient / Family readiness to learn indicated by:verbalized understanding  Persons(s) to be included in education: patient  Barriers to Learning/Limitations:No    Signed     Selena Kaplan RN    12/20/2017   11:54 PM

## 2017-12-21 NOTE — ED PROVIDER NOTES
EMERGENCY DEPARTMENT HISTORY AND PHYSICAL EXAM      Date: 2017  Patient Name: Sabrina Tellez    History of Presenting Illness     Chief Complaint   Patient presents with    Skin Problem       History Provided By: Patient    HPI: Sabrina Tellez, 40 y.o. female with PMHx significant for HTN, and bipolar, presents ambulatory to the ED with cc of worsening painful area of redness, swelling, and warmth to the right axilla x 2 days. Pt states the area began draining purulent drainage en route to the ED. Pt denies hx of similar sx's as well as prior I&D. She specifically denies any fever and chills. PCP: None    There are no other complaints, changes, or physical findings at this time. Current Outpatient Prescriptions   Medication Sig Dispense Refill    cephALEXin (KEFLEX) 500 mg capsule Take 1 Cap by mouth four (4) times daily for 7 days. 28 Cap 0    HYDROcodone-acetaminophen (NORCO) 5-325 mg per tablet Take 1 Tab by mouth every four (4) hours as needed for Pain. Max Daily Amount: 6 Tabs. 6 Tab 0    hydroCHLOROthiazide (MICROZIDE) 12.5 mg capsule Take 1 Cap by mouth daily. 30 Cap 0    albuterol (PROVENTIL HFA, VENTOLIN HFA, PROAIR HFA) 90 mcg/actuation inhaler Take 2 Puffs by inhalation every four (4) hours as needed for Wheezing. 1 Inhaler 1    HYDROcodone-homatropine (HYCODAN) 5-1.5 mg/5 mL (5 mL) syrup Take 5 mL by mouth four (4) times daily. Max Daily Amount: 20 mL. 120 mL 0    albuterol (PROVENTIL HFA, VENTOLIN HFA, PROAIR HFA) 90 mcg/actuation inhaler Take 2 Puffs by inhalation every four (4) hours as needed for Wheezing.  1 Inhaler 0       Past History     Past Medical History:  Past Medical History:   Diagnosis Date    Arthritis     Asthma     bronchitis    Bronchitis     HTN, goal below 140/90 2012    Psychiatric disorder     bipolar       Past Surgical History:  Past Surgical History:   Procedure Laterality Date    DELIVERY       HX HEENT      4 wisdom teeth removed in 1/2011    HX TUBAL LIGATION      c section       Family History:  Family History   Problem Relation Age of Onset    Hypertension Father     Psychiatric Disorder Father      schzophrenia    Asthma Son     Asthma Son     Asthma Daughter        Social History:  Social History   Substance Use Topics    Smoking status: Current Every Day Smoker     Packs/day: 1.00     Years: 12.00    Smokeless tobacco: Never Used    Alcohol use No       Allergies: Allergies   Allergen Reactions    Codeine Nausea and Vomiting     Pt denies this allergy    Tessalon [Benzonatate] Other (comments)     Sneezing    Tramadol Rash         Review of Systems   Review of Systems   Constitutional: Negative for appetite change, chills, diaphoresis, fatigue and fever. HENT: Negative for sore throat and trouble swallowing. Respiratory: Negative for cough and shortness of breath. Cardiovascular: Negative for chest pain. Gastrointestinal: Negative for abdominal pain, diarrhea, nausea and vomiting. Genitourinary: Negative for dysuria and frequency. Musculoskeletal: Negative for arthralgias, back pain and myalgias. Skin: Negative for color change and rash.        + area of redness, swelling, warmth to right axilla   Neurological: Negative for weakness and numbness. Hematological: Does not bruise/bleed easily. All other systems reviewed and are negative. Physical Exam   Physical Exam   Constitutional: She is oriented to person, place, and time. She appears well-developed and well-nourished. No distress. HENT:   Head: Normocephalic and atraumatic. Mouth/Throat: Oropharynx is clear and moist. No oropharyngeal exudate or posterior oropharyngeal erythema. Neck: Normal range of motion and full passive range of motion without pain. Neck supple. Cardiovascular: Normal rate, regular rhythm, normal heart sounds, intact distal pulses and normal pulses. Exam reveals no gallop and no friction rub.     No murmur heard.  Pulmonary/Chest: Effort normal and breath sounds normal. No accessory muscle usage. No respiratory distress. She has no decreased breath sounds. She has no wheezes. She has no rhonchi. She has no rales. Abdominal: Soft. Bowel sounds are normal. She exhibits no distension. There is no tenderness. There is no rebound, no guarding and no CVA tenderness. Musculoskeletal: Normal range of motion. She exhibits no edema or tenderness. Thoracic back: She exhibits no tenderness and no bony tenderness. Lumbar back: She exhibits no tenderness and no bony tenderness. Lymphadenopathy:     She has no cervical adenopathy. Neurological: She is alert and oriented to person, place, and time. She has normal strength. She is not disoriented. No cranial nerve deficit or sensory deficit. No focal deficits; 5/5 muscle strength in all extremities   Skin: Skin is warm. No lesion and no rash noted. Rash is not nodular. She is not diaphoretic. Spontaneously draining abscess to right axilla   Nursing note and vitals reviewed. Medical Decision Making   I am the first provider for this patient. I reviewed the vital signs, available nursing notes, past medical history, past surgical history, family history and social history. Vital Signs-Reviewed the patient's vital signs. Patient Vitals for the past 12 hrs:   Temp Pulse Resp BP SpO2   12/20/17 2341 97.8 °F (36.6 °C) 81 18 (!) 147/100 97 %       Records Reviewed: Nursing Notes and Old Medical Records    Provider Notes (Medical Decision Making):     DDx: abscess, cellulitis, infected sebaceous cyst, epidermoid cyst    ED Course:   Initial assessment performed. The patients presenting problems have been discussed, and they are in agreement with the care plan formulated and outlined with them. I have encouraged them to ask questions as they arise throughout their visit.     11:51 PM  Extruded more pus from spontaneously draining abscess with reasonable pressure. Written by Maria D Ventura, ED scribe, as dictated by Bonnie Rodgers MD    Disposition:    DISCHARGE NOTE:  11:58 PM  The patient is ready for discharge. The patients signs, symptoms, diagnosis, and instructions for discharge have been discussed and the pt has conveyed their understanding. The patient is to follow up as recommended with PCP or return to the ER should their symptoms worsen. Plan has been discussed and patient has conveyed their agreement. PLAN:  1. Discharge home  Discharge Medication List as of 12/21/2017 12:11 AM    cephALEXin (KEFLEX) 500 mg capsule 4 TIMES DAILY           HYDROcodone-acetaminophen (NORCO) 5-325 mg per tablet EVERY 4 HOURS AS NEEDED               2.   Follow-up Information     Follow up With Details Jakob Bo MD    [address]  [phone]          Return to ED if worse     Diagnosis     Clinical Impression:   1. Cellulitis of right axilla    2. Abscess        Attestations: This note is prepared by Maria D Ventura, acting as Scribe for Bonnie Rodgers MD.    Bonnie Rodgers MD: The scribe's documentation has been prepared under my direction and personally reviewed by me in its entirety. I confirm that the note above accurately reflects all work, treatment, procedures, and medical decision making performed by me.

## 2018-01-16 ENCOUNTER — APPOINTMENT (OUTPATIENT)
Dept: GENERAL RADIOLOGY | Age: 38
End: 2018-01-16
Attending: EMERGENCY MEDICINE
Payer: SELF-PAY

## 2018-01-16 ENCOUNTER — HOSPITAL ENCOUNTER (EMERGENCY)
Age: 38
Discharge: HOME | End: 2018-01-16
Attending: EMERGENCY MEDICINE
Payer: SELF-PAY

## 2018-01-16 VITALS
TEMPERATURE: 97.9 F | HEIGHT: 66 IN | SYSTOLIC BLOOD PRESSURE: 151 MMHG | BODY MASS INDEX: 41.78 KG/M2 | WEIGHT: 260 LBS | RESPIRATION RATE: 16 BRPM | HEART RATE: 79 BPM | DIASTOLIC BLOOD PRESSURE: 96 MMHG | OXYGEN SATURATION: 99 %

## 2018-01-16 DIAGNOSIS — G44.209 ACUTE NON INTRACTABLE TENSION-TYPE HEADACHE: ICD-10-CM

## 2018-01-16 DIAGNOSIS — R07.89 CHEST WALL PAIN: Primary | ICD-10-CM

## 2018-01-16 LAB
ALBUMIN SERPL-MCNC: 3.2 G/DL (ref 3.5–5)
ALBUMIN/GLOB SERPL: 1 {RATIO} (ref 1.1–2.2)
ALP SERPL-CCNC: 72 U/L (ref 45–117)
ALT SERPL-CCNC: 24 U/L (ref 12–78)
ANION GAP SERPL CALC-SCNC: 7 MMOL/L (ref 5–15)
AST SERPL-CCNC: 17 U/L (ref 15–37)
BASOPHILS # BLD: 0 K/UL (ref 0–0.1)
BASOPHILS NFR BLD: 0 % (ref 0–1)
BILIRUB SERPL-MCNC: 0.1 MG/DL (ref 0.2–1)
BUN SERPL-MCNC: 10 MG/DL (ref 6–20)
BUN/CREAT SERPL: 14 (ref 12–20)
CALCIUM SERPL-MCNC: 8.3 MG/DL (ref 8.5–10.1)
CHLORIDE SERPL-SCNC: 107 MMOL/L (ref 97–108)
CO2 SERPL-SCNC: 28 MMOL/L (ref 21–32)
CREAT SERPL-MCNC: 0.69 MG/DL (ref 0.55–1.02)
D DIMER PPP FEU-MCNC: 0.4 MG/L FEU (ref 0–0.65)
EOSINOPHIL # BLD: 0.2 K/UL (ref 0–0.4)
EOSINOPHIL NFR BLD: 3 % (ref 0–7)
ERYTHROCYTE [DISTWIDTH] IN BLOOD BY AUTOMATED COUNT: 13.4 % (ref 11.5–14.5)
GLOBULIN SER CALC-MCNC: 3.3 G/DL (ref 2–4)
GLUCOSE SERPL-MCNC: 97 MG/DL (ref 65–100)
HCT VFR BLD AUTO: 37.9 % (ref 35–47)
HGB BLD-MCNC: 12.4 G/DL (ref 11.5–16)
LYMPHOCYTES # BLD: 3.9 K/UL (ref 0.8–3.5)
LYMPHOCYTES NFR BLD: 51 % (ref 12–49)
MCH RBC QN AUTO: 29.5 PG (ref 26–34)
MCHC RBC AUTO-ENTMCNC: 32.7 G/DL (ref 30–36.5)
MCV RBC AUTO: 90 FL (ref 80–99)
MONOCYTES # BLD: 0.5 K/UL (ref 0–1)
MONOCYTES NFR BLD: 7 % (ref 5–13)
NEUTS SEG # BLD: 2.9 K/UL (ref 1.8–8)
NEUTS SEG NFR BLD: 39 % (ref 32–75)
PLATELET # BLD AUTO: 283 K/UL (ref 150–400)
POTASSIUM SERPL-SCNC: 3.9 MMOL/L (ref 3.5–5.1)
PROT SERPL-MCNC: 6.5 G/DL (ref 6.4–8.2)
RBC # BLD AUTO: 4.21 M/UL (ref 3.8–5.2)
SODIUM SERPL-SCNC: 142 MMOL/L (ref 136–145)
TROPONIN I BLD-MCNC: <0.04 NG/ML (ref 0–0.08)
WBC # BLD AUTO: 7.6 K/UL (ref 3.6–11)

## 2018-01-16 PROCEDURE — 80053 COMPREHEN METABOLIC PANEL: CPT | Performed by: EMERGENCY MEDICINE

## 2018-01-16 PROCEDURE — 93005 ELECTROCARDIOGRAM TRACING: CPT

## 2018-01-16 PROCEDURE — 85025 COMPLETE CBC W/AUTO DIFF WBC: CPT | Performed by: EMERGENCY MEDICINE

## 2018-01-16 PROCEDURE — 71045 X-RAY EXAM CHEST 1 VIEW: CPT

## 2018-01-16 PROCEDURE — 84484 ASSAY OF TROPONIN QUANT: CPT

## 2018-01-16 PROCEDURE — 85379 FIBRIN DEGRADATION QUANT: CPT | Performed by: EMERGENCY MEDICINE

## 2018-01-16 PROCEDURE — 99284 EMERGENCY DEPT VISIT MOD MDM: CPT

## 2018-01-16 PROCEDURE — 74011250637 HC RX REV CODE- 250/637: Performed by: EMERGENCY MEDICINE

## 2018-01-16 PROCEDURE — 36415 COLL VENOUS BLD VENIPUNCTURE: CPT | Performed by: EMERGENCY MEDICINE

## 2018-01-16 RX ORDER — HYDROCODONE BITARTRATE AND ACETAMINOPHEN 5; 325 MG/1; MG/1
1 TABLET ORAL
Qty: 10 TAB | Refills: 0 | Status: SHIPPED | OUTPATIENT
Start: 2018-01-16 | End: 2018-02-07

## 2018-01-16 RX ORDER — GUAIFENESIN 100 MG/5ML
325 LIQUID (ML) ORAL
Status: COMPLETED | OUTPATIENT
Start: 2018-01-16 | End: 2018-01-16

## 2018-01-16 RX ORDER — ACETAMINOPHEN 500 MG
1000 TABLET ORAL ONCE
Status: COMPLETED | OUTPATIENT
Start: 2018-01-16 | End: 2018-01-16

## 2018-01-16 RX ADMIN — ACETAMINOPHEN 1000 MG: 500 TABLET, FILM COATED ORAL at 18:47

## 2018-01-16 RX ADMIN — ASPIRIN 324 MG: 81 TABLET, CHEWABLE ORAL at 18:48

## 2018-01-16 NOTE — ED TRIAGE NOTES
Patient reports left wrist pain and headache x 3 days, taking OTC medications without relief. Patient is crying, tells me she just found out that her brother .

## 2018-01-16 NOTE — LETTER
[facility] EMERGENCY DEPT 
[address] [id number] [phone] Work/School Note Date: 1/16/2018 To Whom It May concern: 
 
Stephanie Gamino was seen and treated today in the emergency room by the following provider(s): 
Attending Provider: Belem Sumner MD.   
 
Stephanie Gamino may return to work on 1/18/18. Sincerely, Belem Sumner MD

## 2018-01-16 NOTE — ED NOTES
Headache and intermittent right chest pains and left hand pains. Patient reports symptoms x3 days. Denies fall or injury. States taking otc meds but \"the pain keeps coming back\". Patient reports hx of HTN. Patient states that he mother told her to come into the hospital for fears of stroke and heart attack. Emergency Department Nursing Plan of Care       The Nursing Plan of Care is developed from the Nursing assessment and Emergency Department Attending provider initial evaluation. The plan of care may be reviewed in the ED Provider note.     The Plan of Care was developed with the following considerations:   Patient / Family readiness to learn indicated by:verbalized understanding  Persons(s) to be included in education: patient  Barriers to Learning/Limitations:No    Signed     Torey Sue RN    1/16/2018   5:33 PM

## 2018-01-16 NOTE — ED PROVIDER NOTES
EMERGENCY DEPARTMENT HISTORY AND PHYSICAL EXAM      Date: 2018  Patient Name: Tamy Laguerre    History of Presenting Illness     Chief Complaint   Patient presents with    Headache    Wrist Pain       History Provided By: Patient    HPI: Tamy Laguerre, 40 y.o. female with PMHx significant for bronchitis and asthma, presents ambulatory to the ED with cc of progressively worsening, mild to moderate, non radiating, R sided CP x 3 days. Pt notes associated SOB. She denies any alleviating or exacerbating factors. Pt also presents with mild R sided HA x 3 days. She denies taking any medications PTA. She denies any NV, fever, sore throat, or ear pain. PCP: None    There are no other complaints, changes, or physical findings at this time. Current Outpatient Prescriptions   Medication Sig Dispense Refill    HYDROcodone-acetaminophen (NORCO) 5-325 mg per tablet Take 1 Tab by mouth every four (4) hours as needed for Pain. Max Daily Amount: 6 Tabs. 10 Tab 0    hydroCHLOROthiazide (MICROZIDE) 12.5 mg capsule Take 1 Cap by mouth daily. 30 Cap 0    albuterol (PROVENTIL HFA, VENTOLIN HFA, PROAIR HFA) 90 mcg/actuation inhaler Take 2 Puffs by inhalation every four (4) hours as needed for Wheezing. 1 Inhaler 1    HYDROcodone-homatropine (HYCODAN) 5-1.5 mg/5 mL (5 mL) syrup Take 5 mL by mouth four (4) times daily. Max Daily Amount: 20 mL. 120 mL 0    albuterol (PROVENTIL HFA, VENTOLIN HFA, PROAIR HFA) 90 mcg/actuation inhaler Take 2 Puffs by inhalation every four (4) hours as needed for Wheezing.  1 Inhaler 0       Past History     Past Medical History:  Past Medical History:   Diagnosis Date    Arthritis     Asthma     bronchitis    Bronchitis     HTN, goal below 140/90 2012    Psychiatric disorder     bipolar       Past Surgical History:  Past Surgical History:   Procedure Laterality Date    DELIVERY       HX HEENT      4 wisdom teeth removed in 2011    HX TUBAL LIGATION      c section       Family History:  Family History   Problem Relation Age of Onset    Hypertension Father     Psychiatric Disorder Father      schzophrenia    Asthma Son     Asthma Son     Asthma Daughter        Social History:  Social History   Substance Use Topics    Smoking status: Current Every Day Smoker     Packs/day: 1.00     Years: 12.00    Smokeless tobacco: Never Used    Alcohol use No       Allergies: Allergies   Allergen Reactions    Codeine Nausea and Vomiting     Pt denies this allergy    Tessalon [Benzonatate] Other (comments)     Sneezing    Tramadol Rash         Review of Systems   Review of Systems   Constitutional: Negative for chills and fever. HENT: Negative for congestion, ear pain, rhinorrhea, sneezing and sore throat. Eyes: Negative for redness and visual disturbance. Respiratory: Positive for shortness of breath. Cardiovascular: Positive for chest pain. Negative for leg swelling. Gastrointestinal: Negative for abdominal pain, nausea and vomiting. Genitourinary: Negative for difficulty urinating and frequency. Musculoskeletal: Negative for back pain, myalgias and neck stiffness. Skin: Negative for rash. Neurological: Positive for headaches. Negative for dizziness, syncope and weakness. Hematological: Negative for adenopathy. All other systems reviewed and are negative. Physical Exam   Physical Exam   Constitutional: She is oriented to person, place, and time. She appears well-developed and well-nourished. HENT:   Head: Normocephalic. Right Ear: Tympanic membrane normal.   Left Ear: Tympanic membrane normal.   Mouth/Throat: Oropharynx is clear and moist. No oropharyngeal exudate, posterior oropharyngeal edema or posterior oropharyngeal erythema. Eyes: Conjunctivae and EOM are normal. Pupils are equal, round, and reactive to light. Neck: Normal range of motion. Neck supple.    Cardiovascular: Normal rate, regular rhythm, normal heart sounds and intact distal pulses. Pulmonary/Chest: Effort normal and breath sounds normal.   Abdominal: Soft. Bowel sounds are normal. She exhibits no distension. There is no rebound. Musculoskeletal: Normal range of motion. She exhibits no edema or deformity. Neurological: She is alert and oriented to person, place, and time. Skin: Skin is warm and dry. Psychiatric: She has a normal mood and affect. Her behavior is normal. Judgment and thought content normal.     Diagnostic Study Results     Labs -     Recent Results (from the past 12 hour(s))   EKG, 12 LEAD, INITIAL    Collection Time: 01/16/18  5:44 PM   Result Value Ref Range    Ventricular Rate 62 BPM    Atrial Rate 62 BPM    P-R Interval 174 ms    QRS Duration 92 ms    Q-T Interval 408 ms    QTC Calculation (Bezet) 414 ms    Calculated P Axis 36 degrees    Calculated R Axis 32 degrees    Calculated T Axis 16 degrees    Diagnosis       Normal sinus rhythm  Normal ECG  When compared with ECG of 14-JUL-2013 19:36,  Vent. rate has decreased BY  53 BPM  ST no longer depressed in Inferior leads     CBC WITH AUTOMATED DIFF    Collection Time: 01/16/18  6:32 PM   Result Value Ref Range    WBC 7.6 3.6 - 11.0 K/uL    RBC 4.21 3.80 - 5.20 M/uL    HGB 12.4 11.5 - 16.0 g/dL    HCT 37.9 35.0 - 47.0 %    MCV 90.0 80.0 - 99.0 FL    MCH 29.5 26.0 - 34.0 PG    MCHC 32.7 30.0 - 36.5 g/dL    RDW 13.4 11.5 - 14.5 %    PLATELET 982 186 - 579 K/uL    NEUTROPHILS 39 32 - 75 %    LYMPHOCYTES 51 (H) 12 - 49 %    MONOCYTES 7 5 - 13 %    EOSINOPHILS 3 0 - 7 %    BASOPHILS 0 0 - 1 %    ABS. NEUTROPHILS 2.9 1.8 - 8.0 K/UL    ABS. LYMPHOCYTES 3.9 (H) 0.8 - 3.5 K/UL    ABS. MONOCYTES 0.5 0.0 - 1.0 K/UL    ABS. EOSINOPHILS 0.2 0.0 - 0.4 K/UL    ABS.  BASOPHILS 0.0 0.0 - 0.1 K/UL   D DIMER    Collection Time: 01/16/18  6:32 PM   Result Value Ref Range    D-dimer 0.40 0.00 - 0.65 mg/L FEU   METABOLIC PANEL, COMPREHENSIVE    Collection Time: 01/16/18  6:32 PM   Result Value Ref Range    Sodium 142 136 - 145 mmol/L    Potassium 3.9 3.5 - 5.1 mmol/L    Chloride 107 97 - 108 mmol/L    CO2 28 21 - 32 mmol/L    Anion gap 7 5 - 15 mmol/L    Glucose 97 65 - 100 mg/dL    BUN 10 6 - 20 MG/DL    Creatinine 0.69 0.55 - 1.02 MG/DL    BUN/Creatinine ratio 14 12 - 20      GFR est AA >60 >60 ml/min/1.73m2    GFR est non-AA >60 >60 ml/min/1.73m2    Calcium 8.3 (L) 8.5 - 10.1 MG/DL    Bilirubin, total 0.1 (L) 0.2 - 1.0 MG/DL    ALT (SGPT) 24 12 - 78 U/L    AST (SGOT) 17 15 - 37 U/L    Alk. phosphatase 72 45 - 117 U/L    Protein, total 6.5 6.4 - 8.2 g/dL    Albumin 3.2 (L) 3.5 - 5.0 g/dL    Globulin 3.3 2.0 - 4.0 g/dL    A-G Ratio 1.0 (L) 1.1 - 2.2     POC TROPONIN-I    Collection Time: 01/16/18  6:45 PM   Result Value Ref Range    Troponin-I (POC) <0.04 0.00 - 0.08 ng/mL       Radiologic Studies -     CXR Results  (Last 48 hours)               01/16/18 1812  XR CHEST PORT Final result    Impression:  IMPRESSION:        No acute process. Narrative:  EXAM:  XR CHEST PORT       INDICATION:  Chest Pain       COMPARISON:  11/26/2017       FINDINGS:       A portable AP radiograph of the chest was obtained at 18:10 hours. The lungs are   clear. The cardiac and mediastinal contours and pulmonary vascularity are   normal.  The bones and soft tissues are unremarkable. There has been no change   since the prior study. Medical Decision Making   I am the first provider for this patient. I reviewed the vital signs, available nursing notes, past medical history, past surgical history, family history and social history. Vital Signs-Reviewed the patient's vital signs. Patient Vitals for the past 12 hrs:   Temp Pulse Resp BP SpO2   01/16/18 1609 97.9 °F (36.6 °C) 79 16 (!) 151/96 99 %     EKG interpretation: (Preliminary) 17:44  Rhythm: normal sinus rhythm; and regular .  Rate (approx.): 62; Axis: normal; NM interval: normal; QRS interval: normal ; ST/T wave: normal.  Written by CORKY Gonsalez, as dictated by  García Gusman MD.    Records Reviewed: Nursing Notes and Old Medical Records    Provider Notes (Medical Decision Making):   PE v. MI v. arrhythmia; tension HA v. sinusitis. ED Course:   Initial assessment performed. The patients presenting problems have been discussed, and they are in agreement with the care plan formulated and outlined with them. I have encouraged them to ask questions as they arise throughout their visit. PROGRESS NOTE:   7:35 PM  Pt reevaluated. Pt states she is feeling better after treatment in ED. Disposition:  DISCHARGE NOTE  7:44 PM  The patient has been re-evaluated and is ready for discharge. Reviewed available results with patient. Counseled pt on diagnosis and care plan. Pt has expressed understanding, and all questions have been answered. Pt agrees with plan and agrees to follow up as recommended, or return to the ED if their symptoms worsen. Discharge instructions have been provided and explained to the pt, along with reasons to return to the ED. PLAN:  1. Current Discharge Medication List      START taking these medications    Details   HYDROcodone-acetaminophen (NORCO) 5-325 mg per tablet Take 1 Tab by mouth every four (4) hours as needed for Pain. Max Daily Amount: 6 Tabs. Qty: 10 Tab, Refills: 0    Associated Diagnoses: Chest wall pain; Acute non intractable tension-type headache           2. Follow-up Information     Follow up With Details Comments Contact Info    None Call  None [phone] Patient stated that they have no PCP      [facility] EMERG[facility]  As needed, If symptoms worsen [address] [facility]  [phone]        Return to ED if worse     Diagnosis     Clinical Impression:   1. Chest wall pain    2. Acute non intractable tension-type headache      Attestations:   This note is prepared by Chato Adames, acting as Scribe for  García Gusman MD.     García Gusman MD: The scribe's documentation has been prepared under my direction and personally reviewed by me in its entirety. I confirm that the note above accurately reflects all work, treatment, procedures, and medical decision making performed by me.

## 2018-01-17 LAB
ATRIAL RATE: 62 BPM
CALCULATED P AXIS, ECG09: 36 DEGREES
CALCULATED R AXIS, ECG10: 32 DEGREES
CALCULATED T AXIS, ECG11: 16 DEGREES
DIAGNOSIS, 93000: NORMAL
P-R INTERVAL, ECG05: 174 MS
Q-T INTERVAL, ECG07: 408 MS
QRS DURATION, ECG06: 92 MS
QTC CALCULATION (BEZET), ECG08: 414 MS
VENTRICULAR RATE, ECG03: 62 BPM

## 2018-01-17 NOTE — ED NOTES
Bedside shift change report given to Guinea (oncoming nurse) by Aerial (offgoing nurse). Report included the following information SBAR, Kardex and ED Summary.

## 2018-01-17 NOTE — DISCHARGE INSTRUCTIONS
Musculoskeletal Chest Pain: Care Instructions  Your Care Instructions    Chest pain is not always a sign that something is wrong with your heart or that you have another serious problem. The doctor thinks your chest pain is caused by strained muscles or ligaments, inflamed chest cartilage, or another problem in your chest, rather than by your heart. You may need more tests to find the cause of your chest pain. Follow-up care is a key part of your treatment and safety. Be sure to make and go to all appointments, and call your doctor if you are having problems. It's also a good idea to know your test results and keep a list of the medicines you take. How can you care for yourself at home? · Take pain medicines exactly as directed. ¨ If the doctor gave you a prescription medicine for pain, take it as prescribed. ¨ If you are not taking a prescription pain medicine, ask your doctor if you can take an over-the-counter medicine. · Rest and protect the sore area. · Stop, change, or take a break from any activity that may be causing your pain or soreness. · Put ice or a cold pack on the sore area for 10 to 20 minutes at a time. Try to do this every 1 to 2 hours for the next 3 days (when you are awake) or until the swelling goes down. Put a thin cloth between the ice and your skin. · After 2 or 3 days, apply a heating pad set on low or a warm cloth to the area that hurts. Some doctors suggest that you go back and forth between hot and cold. · Do not wrap or tape your ribs for support. This may cause you to take smaller breaths, which could increase your risk of lung problems. · Mentholated creams such as Bengay or Icy Hot may soothe sore muscles. Follow the instructions on the package. · Follow your doctor's instructions for exercising. · Gentle stretching and massage may help you get better faster. Stretch slowly to the point just before pain begins, and hold the stretch for at least 15 to 30 seconds.  Do this 3 or 4 times a day. Stretch just after you have applied heat. · As your pain gets better, slowly return to your normal activities. Any increased pain may be a sign that you need to rest a while longer. When should you call for help? Call 911 anytime you think you may need emergency care. For example, call if:  ? · You have chest pain or pressure. This may occur with:  ¨ Sweating. ¨ Shortness of breath. ¨ Nausea or vomiting. ¨ Pain that spreads from the chest to the neck, jaw, or one or both shoulders or arms. ¨ Dizziness or lightheadedness. ¨ A fast or uneven pulse. After calling 911, chew 1 adult-strength aspirin. Wait for an ambulance. Do not try to drive yourself. ? · You have sudden chest pain and shortness of breath, or you cough up blood. ?Call your doctor now or seek immediate medical care if:  ? · You have any trouble breathing. ? · Your chest pain gets worse. ? · Your chest pain occurs consistently with exercise and is relieved by rest.   ? Watch closely for changes in your health, and be sure to contact your doctor if:  ? · Your chest pain does not get better after 1 week. Where can you learn more? Go to http://joao-ladi.info/. Enter V293 in the search box to learn more about \"Musculoskeletal Chest Pain: Care Instructions. \"  Current as of: March 20, 2017  Content Version: 11.4  © 2588-7334 [employer]. Care instructions adapted under license by [employer] (which disclaims liability or warranty for this information). If you have questions about a medical condition or this instruction, always ask your healthcare professional. [place] [address] any warranty or liability for your use of this information. Headache: Care Instructions  Your Care Instructions    Headaches have many possible causes. Most headaches aren't a sign of a more serious problem, and they will get better on their own.  Home treatment may help you feel better faster. The doctor has checked you carefully, but problems can develop later. If you notice any problems or new symptoms, get medical treatment right away. Follow-up care is a key part of your treatment and safety. Be sure to make and go to all appointments, and call your doctor if you are having problems. It's also a good idea to know your test results and keep a list of the medicines you take. How can you care for yourself at home? · Do not drive if you have taken a prescription pain medicine. · Rest in a quiet, dark room until your headache is gone. Close your eyes and try to relax or go to sleep. Don't watch TV or read. · Put a cold, moist cloth or cold pack on the painful area for 10 to 20 minutes at a time. Put a thin cloth between the cold pack and your skin. · Use a warm, moist towel or a heating pad set on low to relax tight shoulder and neck muscles. · Have someone gently massage your neck and shoulders. · Take pain medicines exactly as directed. ¨ If the doctor gave you a prescription medicine for pain, take it as prescribed. ¨ If you are not taking a prescription pain medicine, ask your doctor if you can take an over-the-counter medicine. · Be careful not to take pain medicine more often than the instructions allow, because you may get worse or more frequent headaches when the medicine wears off. · Do not ignore new symptoms that occur with a headache, such as a fever, weakness or numbness, vision changes, or confusion. These may be signs of a more serious problem. To prevent headaches  · Keep a headache diary so you can figure out what triggers your headaches. Avoiding triggers may help you prevent headaches. Record when each headache began, how long it lasted, and what the pain was like (throbbing, aching, stabbing, or dull). Write down any other symptoms you had with the headache, such as nausea, flashing lights or dark spots, or sensitivity to bright light or loud noise. Note if the headache occurred near your period. List anything that might have triggered the headache, such as certain foods (chocolate, cheese, wine) or odors, smoke, bright light, stress, or lack of sleep. · Find healthy ways to deal with stress. Headaches are most common during or right after stressful times. Take time to relax before and after you do something that has caused a headache in the past.  · Try to keep your muscles relaxed by keeping good posture. Check your jaw, face, neck, and shoulder muscles for tension, and try relaxing them. When sitting at a desk, change positions often, and stretch for 30 seconds each hour. · Get plenty of sleep and exercise. · Eat regularly and well. Long periods without food can trigger a headache. · Treat yourself to a massage. Some people find that regular massages are very helpful in relieving tension. · Limit caffeine by not drinking too much coffee, tea, or soda. But don't quit caffeine suddenly, because that can also give you headaches. · Reduce eyestrain from computers by blinking frequently and looking away from the computer screen every so often. Make sure you have proper eyewear and that your monitor is set up properly, about an arm's length away. · Seek help if you have depression or anxiety. Your headaches may be linked to these conditions. Treatment can both prevent headaches and help with symptoms of anxiety or depression. When should you call for help? Call 911 anytime you think you may need emergency care. For example, call if:  ? · You have signs of a stroke. These may include:  ¨ Sudden numbness, paralysis, or weakness in your face, arm, or leg, especially on only one side of your body. ¨ Sudden vision changes. ¨ Sudden trouble speaking. ¨ Sudden confusion or trouble understanding simple statements. ¨ Sudden problems with walking or balance. ¨ A sudden, severe headache that is different from past headaches.    ?Call your doctor now or seek immediate medical care if:  ? · You have a new or worse headache. ? · Your headache gets much worse. Where can you learn more? Go to http://joao-ladi.info/. Enter M271 in the search box to learn more about \"Headache: Care Instructions. \"  Current as of: October 14, 2016  Content Version: 11.4  © 3139-9006 [employer]. Care instructions adapted under license by [employer] (which disclaims liability or warranty for this information). If you have questions about a medical condition or this instruction, always ask your healthcare professional. [place] [address] any warranty or liability for your use of this information.

## 2018-01-17 NOTE — ED NOTES
Patient  given copy of dc instructions and 1 script(s). Patient  verbalized understanding of instructions and script (s). Patient given a current medication reconciliation form and verbalized understanding of their medications. Patient verbalized understanding of the importance of discussing medications with  his or her physician or clinic they will be following up with. Patient alert and oriented and in no acute distress. Patient discharged home ambulatory.

## 2018-02-07 ENCOUNTER — APPOINTMENT (OUTPATIENT)
Dept: CT IMAGING | Age: 38
End: 2018-02-07
Attending: EMERGENCY MEDICINE
Payer: SELF-PAY

## 2018-02-07 ENCOUNTER — HOSPITAL ENCOUNTER (EMERGENCY)
Age: 38
Discharge: HOME | End: 2018-02-07
Attending: EMERGENCY MEDICINE
Payer: SELF-PAY

## 2018-02-07 VITALS
SYSTOLIC BLOOD PRESSURE: 137 MMHG | WEIGHT: 261.19 LBS | HEART RATE: 70 BPM | HEIGHT: 65 IN | TEMPERATURE: 97.8 F | DIASTOLIC BLOOD PRESSURE: 91 MMHG | OXYGEN SATURATION: 99 % | RESPIRATION RATE: 22 BRPM | BODY MASS INDEX: 43.52 KG/M2

## 2018-02-07 DIAGNOSIS — R11.2 NAUSEA AND VOMITING, INTRACTABILITY OF VOMITING NOT SPECIFIED, UNSPECIFIED VOMITING TYPE: Primary | ICD-10-CM

## 2018-02-07 LAB
ALBUMIN SERPL-MCNC: 3.9 G/DL (ref 3.5–5)
ALBUMIN/GLOB SERPL: 1 {RATIO} (ref 1.1–2.2)
ALP SERPL-CCNC: 74 U/L (ref 45–117)
ALT SERPL-CCNC: 35 U/L (ref 12–78)
AMPHET UR QL SCN: NEGATIVE
ANION GAP SERPL CALC-SCNC: 12 MMOL/L (ref 5–15)
APPEARANCE UR: CLEAR
AST SERPL-CCNC: 28 U/L (ref 15–37)
BARBITURATES UR QL SCN: NEGATIVE
BASOPHILS # BLD: 0 K/UL (ref 0–0.1)
BASOPHILS NFR BLD: 0 % (ref 0–1)
BENZODIAZ UR QL: NEGATIVE
BILIRUB SERPL-MCNC: 0.3 MG/DL (ref 0.2–1)
BILIRUB UR QL: NEGATIVE
BUN SERPL-MCNC: 11 MG/DL (ref 6–20)
BUN/CREAT SERPL: 13 (ref 12–20)
CALCIUM SERPL-MCNC: 8.7 MG/DL (ref 8.5–10.1)
CANNABINOIDS UR QL SCN: POSITIVE
CHLORIDE SERPL-SCNC: 105 MMOL/L (ref 97–108)
CO2 SERPL-SCNC: 24 MMOL/L (ref 21–32)
COCAINE UR QL SCN: NEGATIVE
COLOR UR: NORMAL
CREAT SERPL-MCNC: 0.84 MG/DL (ref 0.55–1.02)
DIFFERENTIAL METHOD BLD: ABNORMAL
DRUG SCRN COMMENT,DRGCM: ABNORMAL
EOSINOPHIL # BLD: 0.1 K/UL (ref 0–0.4)
EOSINOPHIL NFR BLD: 1 % (ref 0–7)
ERYTHROCYTE [DISTWIDTH] IN BLOOD BY AUTOMATED COUNT: 13.6 % (ref 11.5–14.5)
GLOBULIN SER CALC-MCNC: 3.9 G/DL (ref 2–4)
GLUCOSE SERPL-MCNC: 156 MG/DL (ref 65–100)
GLUCOSE UR STRIP.AUTO-MCNC: NEGATIVE MG/DL
HCG SERPL QL: NEGATIVE
HCG UR QL: NEGATIVE
HCT VFR BLD AUTO: 42 % (ref 35–47)
HGB BLD-MCNC: 14.3 G/DL (ref 11.5–16)
HGB UR QL STRIP: NEGATIVE
IMM GRANULOCYTES # BLD: 0.1 K/UL (ref 0–0.04)
IMM GRANULOCYTES NFR BLD AUTO: 1 % (ref 0–0.5)
KETONES UR QL STRIP.AUTO: NEGATIVE MG/DL
LEUKOCYTE ESTERASE UR QL STRIP.AUTO: NEGATIVE
LIPASE SERPL-CCNC: 70 U/L (ref 73–393)
LYMPHOCYTES # BLD: 2.2 K/UL (ref 0.8–3.5)
LYMPHOCYTES NFR BLD: 15 % (ref 12–49)
MCH RBC QN AUTO: 29.8 PG (ref 26–34)
MCHC RBC AUTO-ENTMCNC: 34 G/DL (ref 30–36.5)
MCV RBC AUTO: 87.5 FL (ref 80–99)
METHADONE UR QL: NEGATIVE
MONOCYTES # BLD: 0.9 K/UL (ref 0–1)
MONOCYTES NFR BLD: 6 % (ref 5–13)
NEUTS SEG # BLD: 11.2 K/UL (ref 1.8–8)
NEUTS SEG NFR BLD: 77 % (ref 32–75)
NITRITE UR QL STRIP.AUTO: NEGATIVE
NRBC # BLD: 0 K/UL (ref 0–0.01)
NRBC BLD-RTO: 0 PER 100 WBC
OPIATES UR QL: NEGATIVE
PCP UR QL: NEGATIVE
PH UR STRIP: 6 [PH] (ref 5–8)
PLATELET # BLD AUTO: 289 K/UL (ref 150–400)
PMV BLD AUTO: 9.4 FL (ref 8.9–12.9)
POTASSIUM SERPL-SCNC: 3.8 MMOL/L (ref 3.5–5.1)
PROT SERPL-MCNC: 7.8 G/DL (ref 6.4–8.2)
PROT UR STRIP-MCNC: NEGATIVE MG/DL
RBC # BLD AUTO: 4.8 M/UL (ref 3.8–5.2)
SODIUM SERPL-SCNC: 141 MMOL/L (ref 136–145)
SP GR UR REFRACTOMETRY: 1.03 (ref 1–1.03)
UROBILINOGEN UR QL STRIP.AUTO: 0.2 EU/DL (ref 0.2–1)
WBC # BLD AUTO: 14.5 K/UL (ref 3.6–11)

## 2018-02-07 PROCEDURE — 74011636320 HC RX REV CODE- 636/320: Performed by: EMERGENCY MEDICINE

## 2018-02-07 PROCEDURE — 83690 ASSAY OF LIPASE: CPT | Performed by: EMERGENCY MEDICINE

## 2018-02-07 PROCEDURE — 36415 COLL VENOUS BLD VENIPUNCTURE: CPT | Performed by: EMERGENCY MEDICINE

## 2018-02-07 PROCEDURE — 81025 URINE PREGNANCY TEST: CPT

## 2018-02-07 PROCEDURE — 80053 COMPREHEN METABOLIC PANEL: CPT | Performed by: EMERGENCY MEDICINE

## 2018-02-07 PROCEDURE — 99285 EMERGENCY DEPT VISIT HI MDM: CPT

## 2018-02-07 PROCEDURE — 96376 TX/PRO/DX INJ SAME DRUG ADON: CPT

## 2018-02-07 PROCEDURE — 74011000250 HC RX REV CODE- 250: Performed by: EMERGENCY MEDICINE

## 2018-02-07 PROCEDURE — 96361 HYDRATE IV INFUSION ADD-ON: CPT

## 2018-02-07 PROCEDURE — 96374 THER/PROPH/DIAG INJ IV PUSH: CPT

## 2018-02-07 PROCEDURE — 74011250636 HC RX REV CODE- 250/636: Performed by: EMERGENCY MEDICINE

## 2018-02-07 PROCEDURE — 80307 DRUG TEST PRSMV CHEM ANLYZR: CPT | Performed by: EMERGENCY MEDICINE

## 2018-02-07 PROCEDURE — 74011250637 HC RX REV CODE- 250/637: Performed by: EMERGENCY MEDICINE

## 2018-02-07 PROCEDURE — 81003 URINALYSIS AUTO W/O SCOPE: CPT | Performed by: EMERGENCY MEDICINE

## 2018-02-07 PROCEDURE — 74177 CT ABD & PELVIS W/CONTRAST: CPT

## 2018-02-07 PROCEDURE — 85025 COMPLETE CBC W/AUTO DIFF WBC: CPT | Performed by: EMERGENCY MEDICINE

## 2018-02-07 PROCEDURE — 84703 CHORIONIC GONADOTROPIN ASSAY: CPT | Performed by: EMERGENCY MEDICINE

## 2018-02-07 RX ORDER — ONDANSETRON 2 MG/ML
4 INJECTION INTRAMUSCULAR; INTRAVENOUS
Status: COMPLETED | OUTPATIENT
Start: 2018-02-07 | End: 2018-02-07

## 2018-02-07 RX ORDER — ONDANSETRON 4 MG/1
4 TABLET, ORALLY DISINTEGRATING ORAL
Qty: 10 TAB | Refills: 0 | Status: SHIPPED | OUTPATIENT
Start: 2018-02-07 | End: 2018-12-03

## 2018-02-07 RX ORDER — PROMETHAZINE HYDROCHLORIDE 25 MG/1
25 TABLET ORAL
Status: COMPLETED | OUTPATIENT
Start: 2018-02-07 | End: 2018-02-07

## 2018-02-07 RX ORDER — MORPHINE SULFATE 2 MG/ML
2 INJECTION, SOLUTION INTRAMUSCULAR; INTRAVENOUS
Status: DISCONTINUED | OUTPATIENT
Start: 2018-02-07 | End: 2018-02-07 | Stop reason: HOSPADM

## 2018-02-07 RX ORDER — PROMETHAZINE HYDROCHLORIDE 25 MG/1
25 TABLET ORAL
Qty: 12 TAB | Refills: 0 | Status: SHIPPED | OUTPATIENT
Start: 2018-02-07 | End: 2018-02-08

## 2018-02-07 RX ORDER — SODIUM CHLORIDE 0.9 % (FLUSH) 0.9 %
10 SYRINGE (ML) INJECTION
Status: COMPLETED | OUTPATIENT
Start: 2018-02-07 | End: 2018-02-07

## 2018-02-07 RX ADMIN — Medication 10 ML: at 09:31

## 2018-02-07 RX ADMIN — ONDANSETRON 4 MG: 2 INJECTION, SOLUTION INTRAMUSCULAR; INTRAVENOUS at 10:18

## 2018-02-07 RX ADMIN — PROMETHAZINE HYDROCHLORIDE 25 MG: 25 TABLET ORAL at 11:37

## 2018-02-07 RX ADMIN — IOPAMIDOL 100 ML: 755 INJECTION, SOLUTION INTRAVENOUS at 09:30

## 2018-02-07 RX ADMIN — ONDANSETRON 4 MG: 2 INJECTION, SOLUTION INTRAMUSCULAR; INTRAVENOUS at 08:00

## 2018-02-07 RX ADMIN — SODIUM CHLORIDE 1000 ML: 900 INJECTION, SOLUTION INTRAVENOUS at 08:01

## 2018-02-07 NOTE — DISCHARGE INSTRUCTIONS
Nausea and Vomiting: Care Instructions  Your Care Instructions    When you are nauseated, you may feel weak and sweaty and notice a lot of saliva in your mouth. Nausea often leads to vomiting. Most of the time you do not need to worry about nausea and vomiting, but they can be signs of other illnesses. Two common causes of nausea and vomiting are stomach flu and food poisoning. Nausea and vomiting from viral stomach flu will usually start to improve within 24 hours. Nausea and vomiting from food poisoning may last from 12 to 48 hours. The doctor has checked you carefully, but problems can develop later. If you notice any problems or new symptoms, get medical treatment right away. Follow-up care is a key part of your treatment and safety. Be sure to make and go to all appointments, and call your doctor if you are having problems. It's also a good idea to know your test results and keep a list of the medicines you take. How can you care for yourself at home? · To prevent dehydration, drink plenty of fluids, enough so that your urine is light yellow or clear like water. Choose water and other caffeine-free clear liquids until you feel better. If you have kidney, heart, or liver disease and have to limit fluids, talk with your doctor before you increase the amount of fluids you drink. · Rest in bed until you feel better. · When you are able to eat, try clear soups, mild foods, and liquids until all symptoms are gone for 12 to 48 hours. Other good choices include dry toast, crackers, cooked cereal, and gelatin dessert, such as Jell-O. When should you call for help? Call 911 anytime you think you may need emergency care. For example, call if:  ? · You passed out (lost consciousness). ?Call your doctor now or seek immediate medical care if:  ? · You have symptoms of dehydration, such as:  ¨ Dry eyes and a dry mouth. ¨ Passing only a little dark urine.   ¨ Feeling thirstier than usual.   ? · You have new or worsening belly pain. ? · You have a new or higher fever. ? · You vomit blood or what looks like coffee grounds. ? Watch closely for changes in your health, and be sure to contact your doctor if:  ? · You have ongoing nausea and vomiting. ? · Your vomiting is getting worse. ? · Your vomiting lasts longer than 2 days. ? · You are not getting better as expected. Where can you learn more? Go to http://joao-ladi.info/. Enter 25 [id number] in the search box to learn more about \"Nausea and Vomiting: Care Instructions. \"  Current as of: March 20, 2017  Content Version: 11.4  © 6618-3526 [employer]. Care instructions adapted under license by [employer] (which disclaims liability or warranty for this information). If you have questions about a medical condition or this instruction, always ask your healthcare professional. [address] any warranty or liability for your use of this information.

## 2018-02-07 NOTE — ED NOTES
Patient was brought in by EMS with c/o abdominal pain, nausea, vomiting and diarrhea. She states the symptoms started this morning. She denies any alcohol or drug use. She denies any history or abdominal surgeries with the exception of a .

## 2018-02-07 NOTE — ED NOTES
Emergency Department Nursing Plan of Care       The Nursing Plan of Care is developed from the Nursing assessment and Emergency Department Attending provider initial evaluation. The plan of care may be reviewed in the ED Provider note.     The Plan of Care was developed with the following considerations:   Patient / Family readiness to learn indicated by:verbalized understanding  Persons(s) to be included in education: patient  Barriers to Learning/Limitations:No    Signed     Mikayla Lucia RN    2/7/2018   7:54 AM

## 2018-02-07 NOTE — ED PROVIDER NOTES
EMERGENCY DEPARTMENT HISTORY AND PHYSICAL EXAM      Date: 2/7/2018  Patient Name: Kris Maldonado    History of Presenting Illness     Chief Complaint   Patient presents with    Abdominal Pain     Abdominal pain, nausea, vomiting, diarrhea all started this morning     History Provided By: Patient    HPI: Kris Maldonado, 45 y.o. female with PMHx significant for HTN, arthritis, asthma, presents via EMS to the ED with cc of sudden onset of nausea, vomiting, and diarrhea since this morning. Pt reports she has had multiple bouts of emesis and diarrhea withoiut relief. Pt reports the onset of a moderate intensity abdominal pain following cycles of emesis and diarrhea. Pt states the pain is increased prior to and during emesis/diarrhea, and subsides mildly subsequently. Pt informs she has had something similar to this in the past where she received Zofran which helped to significantly decrease her discomfort. Pt notes trying no OTC medications for symptomatic alleviation. Pt specifically denies any fevers, chills, urinary complications, chest pain, or SOB. PMHx: bipolar disorder   PSHx: tubal ligation   Social Hx: - EtOH; + Smoker; - Illicit Drugs    PCP: None    There are no other complaints, changes, or physical findings at this time. Current Facility-Administered Medications   Medication Dose Route Frequency Provider Last Rate Last Dose    morphine injection 2 mg  2 mg IntraVENous NOW Nayla Singh MD        promethazine (PHENERGAN) 12.5 mg in 0.9% sodium chloride 50 mL IVPB  12.5 mg IntraVENous ONCE Nayla Singh MD         Current Outpatient Prescriptions   Medication Sig Dispense Refill    ondansetron (ZOFRAN ODT) 4 mg disintegrating tablet Take 1 Tab by mouth every eight (8) hours as needed for Nausea. 10 Tab 0    promethazine (PHENERGAN) 25 mg tablet Take 1 Tab by mouth every six (6) hours as needed for Nausea.  12 Tab 0    hydroCHLOROthiazide (MICROZIDE) 12.5 mg capsule Take 1 Cap by mouth daily. 30 Cap 0    albuterol (PROVENTIL HFA, VENTOLIN HFA, PROAIR HFA) 90 mcg/actuation inhaler Take 2 Puffs by inhalation every four (4) hours as needed for Wheezing. 1 Inhaler 1     Past History     Past Medical History:  Past Medical History:   Diagnosis Date    Arthritis     Asthma     bronchitis    Bronchitis     HTN, goal below 140/90 2012    Psychiatric disorder     bipolar     Past Surgical History:  Past Surgical History:   Procedure Laterality Date    DELIVERY       HX HEENT      4 wisdom teeth removed in 2011    HX TUBAL LIGATION      c section     Family History:  Family History   Problem Relation Age of Onset    Hypertension Father     Psychiatric Disorder Father      schzophrenia    Asthma Son     Asthma Son     Asthma Daughter      Social History:  Social History   Substance Use Topics    Smoking status: Current Every Day Smoker     Packs/day: 1.00     Years: 12.00    Smokeless tobacco: Never Used    Alcohol use No     Allergies: Allergies   Allergen Reactions    Codeine Nausea and Vomiting     Pt denies this allergy    Tessalon [Benzonatate] Other (comments)     Sneezing    Tramadol Rash     Review of Systems   Review of Systems   Constitutional: Negative for chills and fever. HENT: Negative for congestion, rhinorrhea, sneezing and sore throat. Eyes: Negative for redness and visual disturbance. Respiratory: Negative for shortness of breath. Cardiovascular: Negative for chest pain and leg swelling. Gastrointestinal: Positive for abdominal pain, diarrhea, nausea and vomiting. Genitourinary: Negative for difficulty urinating, dysuria, frequency, hematuria and urgency. Musculoskeletal: Negative for back pain, myalgias and neck pain. Skin: Negative for rash. Neurological: Negative for dizziness, syncope, weakness and headaches. Hematological: Negative for adenopathy. All other systems reviewed and are negative.     Physical Exam   Physical Exam Constitutional: She is oriented to person, place, and time. She appears well-developed and well-nourished. HENT:   Head: Normocephalic. Mouth/Throat: Oropharynx is clear and moist.   Eyes: Conjunctivae and EOM are normal. Pupils are equal, round, and reactive to light. Neck: Normal range of motion. Neck supple. Cardiovascular: Normal rate, regular rhythm, normal heart sounds and intact distal pulses. Pulmonary/Chest: Effort normal and breath sounds normal.   Abdominal: Soft. Bowel sounds are normal. She exhibits no distension. There is no rebound. Musculoskeletal: Normal range of motion. She exhibits no edema or deformity. Neurological: She is alert and oriented to person, place, and time. Skin: Skin is warm and dry. Psychiatric: She has a normal mood and affect. Her behavior is normal. Judgment and thought content normal.     Diagnostic Study Results     Labs -     Recent Results (from the past 12 hour(s))   CBC WITH AUTOMATED DIFF    Collection Time: 02/07/18  7:53 AM   Result Value Ref Range    WBC 14.5 (H) 3.6 - 11.0 K/uL    RBC 4.80 3.80 - 5.20 M/uL    HGB 14.3 11.5 - 16.0 g/dL    HCT 42.0 35.0 - 47.0 %    MCV 87.5 80.0 - 99.0 FL    MCH 29.8 26.0 - 34.0 PG    MCHC 34.0 30.0 - 36.5 g/dL    RDW 13.6 11.5 - 14.5 %    PLATELET 319 868 - 089 K/uL    MPV 9.4 8.9 - 12.9 FL    NRBC 0.0 0  WBC    ABSOLUTE NRBC 0.00 0.00 - 0.01 K/uL    NEUTROPHILS 77 (H) 32 - 75 %    LYMPHOCYTES 15 12 - 49 %    MONOCYTES 6 5 - 13 %    EOSINOPHILS 1 0 - 7 %    BASOPHILS 0 0 - 1 %    IMMATURE GRANULOCYTES 1 (H) 0.0 - 0.5 %    ABS. NEUTROPHILS 11.2 (H) 1.8 - 8.0 K/UL    ABS. LYMPHOCYTES 2.2 0.8 - 3.5 K/UL    ABS. MONOCYTES 0.9 0.0 - 1.0 K/UL    ABS. EOSINOPHILS 0.1 0.0 - 0.4 K/UL    ABS. BASOPHILS 0.0 0.0 - 0.1 K/UL    ABS. IMM.  GRANS. 0.1 (H) 0.00 - 0.04 K/UL    DF AUTOMATED     METABOLIC PANEL, COMPREHENSIVE    Collection Time: 02/07/18  7:53 AM   Result Value Ref Range    Sodium 141 136 - 145 mmol/L Potassium 3.8 3.5 - 5.1 mmol/L    Chloride 105 97 - 108 mmol/L    CO2 24 21 - 32 mmol/L    Anion gap 12 5 - 15 mmol/L    Glucose 156 (H) 65 - 100 mg/dL    BUN 11 6 - 20 MG/DL    Creatinine 0.84 0.55 - 1.02 MG/DL    BUN/Creatinine ratio 13 12 - 20      GFR est AA >60 >60 ml/min/1.73m2    GFR est non-AA >60 >60 ml/min/1.73m2    Calcium 8.7 8.5 - 10.1 MG/DL    Bilirubin, total 0.3 0.2 - 1.0 MG/DL    ALT (SGPT) 35 12 - 78 U/L    AST (SGOT) 28 15 - 37 U/L    Alk.  phosphatase 74 45 - 117 U/L    Protein, total 7.8 6.4 - 8.2 g/dL    Albumin 3.9 3.5 - 5.0 g/dL    Globulin 3.9 2.0 - 4.0 g/dL    A-G Ratio 1.0 (L) 1.1 - 2.2     LIPASE    Collection Time: 02/07/18  7:53 AM   Result Value Ref Range    Lipase 70 (L) 73 - 393 U/L   HCG QL SERUM    Collection Time: 02/07/18  7:53 AM   Result Value Ref Range    HCG, Ql. NEGATIVE  NEG     URINALYSIS W/ RFLX MICROSCOPIC    Collection Time: 02/07/18 10:22 AM   Result Value Ref Range    Color YELLOW/STRAW      Appearance CLEAR CLEAR      Specific gravity 1.030 1.003 - 1.030      pH (UA) 6.0 5.0 - 8.0      Protein NEGATIVE  NEG mg/dL    Glucose NEGATIVE  NEG mg/dL    Ketone NEGATIVE  NEG mg/dL    Bilirubin NEGATIVE  NEG      Blood NEGATIVE  NEG      Urobilinogen 0.2 0.2 - 1.0 EU/dL    Nitrites NEGATIVE  NEG      Leukocyte Esterase NEGATIVE  NEG     DRUG SCREEN, URINE    Collection Time: 02/07/18 10:22 AM   Result Value Ref Range    AMPHETAMINES NEGATIVE  NEG      BARBITURATES NEGATIVE  NEG      BENZODIAZEPINES NEGATIVE  NEG      COCAINE NEGATIVE  NEG      METHADONE NEGATIVE  NEG      OPIATES NEGATIVE  NEG      PCP(PHENCYCLIDINE) NEGATIVE  NEG      THC (TH-CANNABINOL) POSITIVE (A) NEG      Drug screen comment (NOTE)    HCG URINE, QL. - POC    Collection Time: 02/07/18 10:22 AM   Result Value Ref Range    Pregnancy test,urine (POC) NEGATIVE  NEG       Radiologic Studies -   CT ABD PELV W CONT   Final Result        CT Results  (Last 48 hours)               02/07/18 0947  CT ABD PELV W CONT Final result    Impression:  IMPRESSION:   No acute abnormality. Narrative:  EXAM:  CT ABD PELV W CONT       INDICATION: Abdominal pain, nausea, vomiting, diarrhea today       COMPARISON: None       CONTRAST:  100 mL of Isovue-370. TECHNIQUE:    Following the uneventful intravenous administration of contrast, thin axial   images were obtained through the abdomen and pelvis. Coronal and sagittal   reconstructions were generated. Oral contrast was not administered. CT dose   reduction was achieved through use of a standardized protocol tailored for this   examination and automatic exposure control for dose modulation. FINDINGS:    LUNG BASES: Clear. INCIDENTALLY IMAGED HEART AND MEDIASTINUM: Unremarkable. LIVER: No mass or biliary dilatation. GALLBLADDER: Unremarkable. SPLEEN: No mass. PANCREAS: No mass or ductal dilatation. ADRENALS: Unremarkable. KIDNEYS: No mass, calculus, or hydronephrosis. STOMACH: Unremarkable. SMALL BOWEL: No dilatation or wall thickening. COLON: No dilatation or wall thickening. APPENDIX: Unremarkable. PERITONEUM: No ascites or pneumoperitoneum. RETROPERITONEUM: No lymphadenopathy or aortic aneurysm. REPRODUCTIVE ORGANS: There is no pelvic mass or lymphadenopathy. URINARY BLADDER: No mass or calculus. BONES: No destructive bone lesion. ADDITIONAL COMMENTS: N/A               CXR Results  (Last 48 hours)    None        Medical Decision Making   I am the first provider for this patient. I reviewed the vital signs, available nursing notes, past medical history, past surgical history, family history and social history. Vital Signs-Reviewed the patient's vital signs.   Patient Vitals for the past 12 hrs:   Temp Pulse Resp BP SpO2   02/07/18 1100 - 70 - (!) 137/91 99 %   02/07/18 0820 - 72 - - 99 %   02/07/18 0815 - - - - 100 %   02/07/18 0810 - 80 - - 100 %   02/07/18 0750 - 84 - - 100 %   02/07/18 0747 97.8 °F (36.6 °C) 88 22 (!) 119/98 100 %     Pulse Oximetry Analysis - 100% on RA    Cardiac Monitor:   Rate: 88 bpm  Rhythm: Normal Sinus Rhythm    Records Reviewed: Nursing Notes and Old Medical Records    Provider Notes (Medical Decision Making):   DDx: gastroenteritis, viral illness    ED Course:   Initial assessment performed. The patients presenting problems have been discussed, and they are in agreement with the care plan formulated and outlined with them. I have encouraged them to ask questions as they arise throughout their visit. Disposition:  DISCHARGE NOTE:    11:08 AM  The patient is ready for discharge. The patient signs, symptoms, diagnosis, and discharge instructions have been discussed and the patient has conveyed their understanding. The patient is to follow-up as reccommended or returned to the ER should their symptoms worsen. Plan has been discussed and patient is in agreement. PLAN:  1. Current Discharge Medication List      START taking these medications    Details   ondansetron (ZOFRAN ODT) 4 mg disintegrating tablet Take 1 Tab by mouth every eight (8) hours as needed for Nausea. Qty: 10 Tab, Refills: 0      promethazine (PHENERGAN) 25 mg tablet Take 1 Tab by mouth every six (6) hours as needed for Nausea. Qty: 12 Tab, Refills: 0           2. Follow-up Information     Follow up With Details Comments Contact Info    None   None ([phone]) Patient stated that they have no PCP      [facility] EMERGENCY DEPT  As needed, If symptoms worsen [address] [facility]   [address] [address] [facility]  [phone]        Return to ED if worse     Diagnosis     Clinical Impression:   1. Nausea and vomiting, intractability of vomiting not specified, unspecified vomiting type      Attestations:   This note is prepared by Cindy Isabel, acting as Scribe for Jessica Roberson MD.    Jessica Roberson MD: The scribe's documentation has been prepared under my direction and personally reviewed by me in its entirety. I confirm that the note above accurately reflects all work, treatment, procedures, and medical decision making performed by me.

## 2018-02-07 NOTE — LETTER
[facility] EMERGENCY DEPT 
[address] [id number] [phone] Work/School Note Date: 2/7/2018 To Whom It May concern: 
 
Lynne Morales was seen and treated today in the emergency room by the following provider(s): 
Attending Provider: Clementine Workman MD.   
 
Lynne Morales may return to work on 02/09/2018. Sincerely, Ewa Smiley RN

## 2018-02-08 ENCOUNTER — HOSPITAL ENCOUNTER (EMERGENCY)
Age: 38
Discharge: HOME | End: 2018-02-08
Attending: EMERGENCY MEDICINE
Payer: SELF-PAY

## 2018-02-08 VITALS
HEART RATE: 100 BPM | TEMPERATURE: 98.5 F | HEIGHT: 66 IN | SYSTOLIC BLOOD PRESSURE: 146 MMHG | RESPIRATION RATE: 17 BRPM | OXYGEN SATURATION: 100 % | DIASTOLIC BLOOD PRESSURE: 79 MMHG | BODY MASS INDEX: 40.18 KG/M2 | WEIGHT: 250 LBS

## 2018-02-08 DIAGNOSIS — J11.1 FLU SYNDROME: Primary | ICD-10-CM

## 2018-02-08 PROCEDURE — 99282 EMERGENCY DEPT VISIT SF MDM: CPT

## 2018-02-08 RX ORDER — HYDROCODONE BITARTRATE AND HOMATROPINE METHYLBROMIDE 1.5; 5 MG/5ML; MG/5ML
5 SYRUP ORAL 4 TIMES DAILY
Qty: 120 ML | Refills: 0 | Status: SHIPPED | OUTPATIENT
Start: 2018-02-08 | End: 2018-12-03

## 2018-02-08 NOTE — ED PROVIDER NOTES
EMERGENCY DEPARTMENT HISTORY AND PHYSICAL EXAM      Date: 2018  Patient Name: Catherine Dewey    History of Presenting Illness     Chief Complaint   Patient presents with    Vomiting     nasal congestion, nasal drainage, sore throat, cough, patient recent contact with +flu family       History Provided By: Patient    HPI: Catherine Dewey, 45 y.o. female with PMHx significant for bronchitis, arthritis, HTN, bipolar disorder, and bronchitis, presents ambulatory to the ED with cc of a persistent cough with rhinorrhea which started yesterday. Pt also c/o sore throat and rib pain secondary to coughing. Her pain is worse with coughing. Pt notes her associated pain is moderate. She describes her pain as an ache. Pt reports having positive flu contact. She did not get a flu shot this year. Pt has tried Nyquil and Dayquil with minimal relief. She denies fevers. Pt is otherwise without complaint. PCP: None    There are no other complaints, changes, or physical findings at this time. Current Outpatient Prescriptions   Medication Sig Dispense Refill    HYDROcodone-homatropine (HYCODAN) 5-1.5 mg/5 mL (5 mL) syrup Take 5 mL by mouth four (4) times daily. Max Daily Amount: 20 mL. 120 mL 0    ondansetron (ZOFRAN ODT) 4 mg disintegrating tablet Take 1 Tab by mouth every eight (8) hours as needed for Nausea. 10 Tab 0    albuterol (PROVENTIL HFA, VENTOLIN HFA, PROAIR HFA) 90 mcg/actuation inhaler Take 2 Puffs by inhalation every four (4) hours as needed for Wheezing. 1 Inhaler 1    hydroCHLOROthiazide (MICROZIDE) 12.5 mg capsule Take 1 Cap by mouth daily.  30 Cap 0       Past History     Past Medical History:  Past Medical History:   Diagnosis Date    Arthritis     Asthma     bronchitis    Bronchitis     HTN, goal below 140/90 2012    Psychiatric disorder     bipolar       Past Surgical History:  Past Surgical History:   Procedure Laterality Date    DELIVERY       HX HEENT      4 wisdom teeth removed in 1/2011    HX TUBAL LIGATION      c section       Family History:  Family History   Problem Relation Age of Onset    Hypertension Father     Psychiatric Disorder Father      schzophrenia    Asthma Son     Asthma Son     Asthma Daughter        Social History:  Social History   Substance Use Topics    Smoking status: Current Every Day Smoker     Packs/day: 1.00     Years: 12.00    Smokeless tobacco: Never Used    Alcohol use No       Allergies: Allergies   Allergen Reactions    Codeine Nausea and Vomiting     Pt denies this allergy    Tessalon [Benzonatate] Other (comments)     Sneezing    Tramadol Rash         Review of Systems   Review of Systems   Constitutional: Negative for chills and fever. HENT: Positive for rhinorrhea and sore throat. Negative for congestion and sneezing. Eyes: Negative for redness and visual disturbance. Respiratory: Positive for cough. Negative for shortness of breath. Cardiovascular: Negative for leg swelling. Gastrointestinal: Negative for abdominal pain, nausea and vomiting. Genitourinary: Negative for difficulty urinating and frequency. Musculoskeletal: Positive for arthralgias (rib pain). Negative for back pain, myalgias and neck stiffness. Skin: Negative for rash. Neurological: Negative for dizziness, syncope, weakness and headaches. Hematological: Negative for adenopathy. All other systems reviewed and are negative. Physical Exam   Physical Exam   Constitutional: She is oriented to person, place, and time. She appears well-developed and well-nourished. HENT:   Head: Normocephalic. Mouth/Throat: Posterior oropharyngeal erythema (mild) present. Eyes: Conjunctivae and EOM are normal. Pupils are equal, round, and reactive to light. Neck: Normal range of motion. Neck supple. Cardiovascular: Normal rate, regular rhythm, normal heart sounds and intact distal pulses.     Pulmonary/Chest: Effort normal and breath sounds normal. Abdominal: Soft. Bowel sounds are normal. She exhibits no distension. There is no rebound. Musculoskeletal: Normal range of motion. She exhibits no edema or deformity. Neurological: She is alert and oriented to person, place, and time. Skin: Skin is warm and dry. Psychiatric: She has a normal mood and affect. Her behavior is normal. Judgment and thought content normal.     Diagnostic Study Results     Radiologic Studies -   No orders to display     CT Results  (Last 48 hours)               02/07/18 0930  CT ABD PELV W CONT Final result    Impression:  IMPRESSION:   No acute abnormality. Narrative:  EXAM:  CT ABD PELV W CONT       INDICATION: Abdominal pain, nausea, vomiting, diarrhea today       COMPARISON: None       CONTRAST:  100 mL of Isovue-370. TECHNIQUE:    Following the uneventful intravenous administration of contrast, thin axial   images were obtained through the abdomen and pelvis. Coronal and sagittal   reconstructions were generated. Oral contrast was not administered. CT dose   reduction was achieved through use of a standardized protocol tailored for this   examination and automatic exposure control for dose modulation. FINDINGS:    LUNG BASES: Clear. INCIDENTALLY IMAGED HEART AND MEDIASTINUM: Unremarkable. LIVER: No mass or biliary dilatation. GALLBLADDER: Unremarkable. SPLEEN: No mass. PANCREAS: No mass or ductal dilatation. ADRENALS: Unremarkable. KIDNEYS: No mass, calculus, or hydronephrosis. STOMACH: Unremarkable. SMALL BOWEL: No dilatation or wall thickening. COLON: No dilatation or wall thickening. APPENDIX: Unremarkable. PERITONEUM: No ascites or pneumoperitoneum. RETROPERITONEUM: No lymphadenopathy or aortic aneurysm. REPRODUCTIVE ORGANS: There is no pelvic mass or lymphadenopathy. URINARY BLADDER: No mass or calculus. BONES: No destructive bone lesion.    ADDITIONAL COMMENTS: N/A               Medical Decision Making   I am the first provider for this patient. I reviewed the vital signs, available nursing notes, past medical history, past surgical history, family history and social history. Vital Signs-Reviewed the patient's vital signs. Patient Vitals for the past 12 hrs:   Temp Pulse Resp BP SpO2   02/08/18 1558 98.5 °F (36.9 °C) (!) 108 17 146/79 100 %     Records Reviewed: Nursing Notes and Old Medical Records    Provider Notes (Medical Decision Making):   DDx: URI vs influenza     ED Course:   Initial assessment performed. The patients presenting problems have been discussed, and they are in agreement with the care plan formulated and outlined with them. I have encouraged them to ask questions as they arise throughout their visit. Disposition:  DISCHARGE NOTE  5:14 PM  The patient has been re-evaluated and is ready for discharge. Reviewed available results with patient. Counseled patient on diagnosis and care plan. Patient has expressed understanding, and all questions have been answered. Patient agrees with plan and agrees to follow up as recommended, or return to the ED if their symptoms worsen. Discharge instructions have been provided and explained to the patient, along with reasons to return to the ED. PLAN:  1. Current Discharge Medication List      START taking these medications    Details   HYDROcodone-homatropine (HYCODAN) 5-1.5 mg/5 mL (5 mL) syrup Take 5 mL by mouth four (4) times daily. Max Daily Amount: 20 mL. Qty: 120 mL, Refills: 0    Associated Diagnoses: Flu syndrome           2. Follow-up Information     Follow up With Details Comments Contact Info    None Call  None ([phone]) Patient stated that they have no PCP      [facility] EMERGENCY DEPT  As needed, If symptoms worsen [address] [facility]  [phone]        Return to ED if worse     Diagnosis     Clinical Impression:   1. Flu syndrome        Attestations:    Attestation Note:  This note is prepared by TIAN  [facility], acting as Scribe for MD Nafisa Welch MD: The scribe's documentation has been prepared under my direction and personally reviewed by me in its entirety. I confirm that the note above accurately reflects all work, treatment, procedures, and medical decision making performed by me.

## 2018-02-08 NOTE — ED NOTES
Pt given printed discharge instructions and 1 script(s). Pt verbalized understanding of instructions and script(s). Pt verbalized importance of following up with PCP. Pt alert and oriented, in no acute distress, ambulatory with self. Patient given work note.

## 2018-02-08 NOTE — LETTER
[facility] EMERGENCY DEPT 
[address] [address] 
[phone] Work/School Note Date: 2/8/2018 To Whom It May concern: 
 
Anish North was seen and treated today in the emergency room by the following provider(s): 
Attending Provider: Major Owen MD.   
 
Anish North may return to work on 2/10/18. Sincerely, Major Owen MD

## 2018-02-08 NOTE — ED NOTES
Patient states, \"I was just here yesterday and I just found out today that someone I been around was diagnosed with the flu, and we just buried my brother last week, he  from the flu, they didn't test him. \" Patient reports new onset of sore throat, cough and nasal congestion today. Patient denies body aches, patient reports that she has a son and she doesn't want to share the germ with him. Patient advised on ways to control infection and decrease germ spreading at home.

## 2018-02-08 NOTE — ED NOTES
Emergency Department Nursing Plan of Care       The Nursing Plan of Care is developed from the Nursing assessment and Emergency Department Attending provider initial evaluation. The plan of care may be reviewed in the ED Provider note.     The Plan of Care was developed with the following considerations:   Patient / Family readiness to learn indicated by:verbalized understanding  Persons(s) to be included in education: patient  Barriers to Learning/Limitations:No    Signed     Asad Shah RN    2/8/2018   5:27 PM

## 2018-02-08 NOTE — DISCHARGE INSTRUCTIONS

## 2018-02-08 NOTE — LETTER
[facility] EMERGENCY DEPT 
[address] [place] [address] [address] 
[phone] Work/School Note Date: 2/8/2018 To Whom It May concern: 
 
Dylan Encarnacion was seen and treated today in the emergency room by the following provider(s): 
Attending Provider: Melany Ornelas MD.   
 
Dylan Encarnacion may return to work on 2/9/18. Sincerely, Ramiro Beltran

## 2018-02-19 ENCOUNTER — HOSPITAL ENCOUNTER (EMERGENCY)
Age: 38
Discharge: HOME | End: 2018-02-19
Attending: EMERGENCY MEDICINE
Payer: SELF-PAY

## 2018-02-19 VITALS
OXYGEN SATURATION: 100 % | BODY MASS INDEX: 41.81 KG/M2 | HEIGHT: 66 IN | HEART RATE: 70 BPM | RESPIRATION RATE: 18 BRPM | TEMPERATURE: 98.3 F | WEIGHT: 260.14 LBS | SYSTOLIC BLOOD PRESSURE: 137 MMHG | DIASTOLIC BLOOD PRESSURE: 93 MMHG

## 2018-02-19 DIAGNOSIS — J06.9 ACUTE UPPER RESPIRATORY INFECTION: Primary | ICD-10-CM

## 2018-02-19 PROCEDURE — 94640 AIRWAY INHALATION TREATMENT: CPT

## 2018-02-19 PROCEDURE — 74011250637 HC RX REV CODE- 250/637: Performed by: EMERGENCY MEDICINE

## 2018-02-19 PROCEDURE — 99282 EMERGENCY DEPT VISIT SF MDM: CPT

## 2018-02-19 RX ORDER — PREDNISONE 20 MG/1
60 TABLET ORAL
Status: DISCONTINUED | OUTPATIENT
Start: 2018-02-19 | End: 2018-02-19 | Stop reason: HOSPADM

## 2018-02-19 RX ORDER — DOXYCYCLINE HYCLATE 100 MG
100 TABLET ORAL 2 TIMES DAILY
Qty: 14 TAB | Refills: 0 | Status: SHIPPED | OUTPATIENT
Start: 2018-02-19 | End: 2018-02-26

## 2018-02-19 RX ORDER — ALBUTEROL SULFATE 90 UG/1
1 AEROSOL, METERED RESPIRATORY (INHALATION)
Status: COMPLETED | OUTPATIENT
Start: 2018-02-19 | End: 2018-02-19

## 2018-02-19 RX ORDER — DOXYCYCLINE HYCLATE 100 MG
100 TABLET ORAL 2 TIMES DAILY
Qty: 14 TAB | Refills: 0 | Status: SHIPPED | OUTPATIENT
Start: 2018-02-19 | End: 2018-02-19

## 2018-02-19 RX ORDER — METHYLPREDNISOLONE 4 MG/1
TABLET ORAL
Qty: 1 DOSE PACK | Refills: 0 | Status: SHIPPED | OUTPATIENT
Start: 2018-02-19 | End: 2018-12-03

## 2018-02-19 RX ORDER — METHYLPREDNISOLONE 4 MG/1
TABLET ORAL
Qty: 1 DOSE PACK | Refills: 0 | Status: SHIPPED | OUTPATIENT
Start: 2018-02-19 | End: 2018-02-19

## 2018-02-19 RX ADMIN — ALBUTEROL SULFATE 1 PUFF: 90 AEROSOL, METERED RESPIRATORY (INHALATION) at 04:07

## 2018-02-19 NOTE — Clinical Note
[facility] [address] [place] [address] Physicians [phone] MD Janet Vega MD  
 
[facility] [phone] Anisha Calzada, [facility] Suyapa Reeves, MD Alexandru Pedro, MD Lisa Mcdowell MD  
 
[address] [facility] [phone] Ottrish Shoulder, MD Danita Camacho [facility] [phone] Codie Sheridan, MD Rolly Mckeon, MD Stevenson Alvarado, MD Daisy Moore MD  
 
[facility] [phone] Jarvis Moulton, MD Michael Hurtado, MD Alexa Bradshaw,  [address] [phone] Kate Sterling, MD Ligia Ortiz, MD Chantal Beasley, MD Jessica Abarca, MD Leola Malloy, MD Jhoan Strauss, MD Hemal Bowling, [address] [place] [address] [phone] Kade Dudley MD 
 
[facility] [phone] Shanique Ruiz, MD Dariusz Deleon, NP Cain Chilel, MD Dada Laureano, MD Marva Smith, MD Nicole Tovar, MD Ruiz 39 [facility] [phone] Ravi Cuevas, MD Nara Mccormick, P Madison Mota, NP  
Flakito Roe, MD Laisha Mercado, MD Ashanti Banks, MD Miranda Biggs, MD 
 
[facility] [phone] MD Bret White,  DO MD [place] MD Lenny Kohut, MD Alyx Dias MD  
 
[facility] [phone] Antoine Bamberger, MD Elza Manuel, MD 
 
 [facility] [phone] MD Estrella Flores MD Jerona Dolly, M D [facility] [phone] MD Radha Dickinson, MD Raymon Rocha, MD Dinorah Dior, MD Tamra Prince, NP Thierry Avila MD  
 
[address] [phone] Di Guadalupe ale, MD Сергей Vazquez MD [facility] [phone] [address] [place] [address], MD Milton Keen, ALISHA Benavidez, ALISHA Vance MD Carvin Genre, NP Wilfred Salinas,  Miscellaneous:  
 
[facility] Departments For adult and child immunizations, family planning, TB screening, STD testing and women's health services. [place]: [place] [phone]  [facility] [phone] [facility]   [phone] [place]   [phone] [facility]   [phone] [place]: [facility] [phone] [place] [phone] [facility] [phone] General Health  [facility] For primary care services, woman and child wellness, and some clinics providing specialty care. [facility]. [address] [place] [address] [phone] [facility] 200 [facility] [phone] [facility] [address] [phone] [address] [place] [address] Drive [address]  Community  [phone] [address] [facility] [phone] [address] [phone] [facility]  [address], 809 [facility]: [facility] [address], ext [phone] [facility], [address] [phone] [place] [address] [phone] [address]  [phone] [employer]  [address], [phone] 3550 [facility] [employer] ([email or web address]/about/mission. asp) [phone] Sexual Health/Woman Wellness Clinics For STD/HIV testing and treatment, pregnancy testing and services, men's health, birth control services, LGBT services, and hepatitis/HPV vaccine services. [employer] for [place] [employer] 201 N. [facility] [phone] [facility] of [address] Flakito Gaston [phone] [address] [phone] [address], 5th floor [phone] [facility] of 1065 [facility] 427 [facility] [address] [facility] [address]. Sweetie Shear [phone] Specialty Service Clinics [address] [facility] [address] [phone] [address] [facility] [address]   [phone] [employer]   [phone] Women, Infant and Children's Services: [address] [phone] [address] [phone] [facility]   [phone] [address] 8686 [facility]     [phone] 1325 [facility]   [phone] 8 [facility] [phone] Thank you for allowing us to provide you with excellent care t alejandra. We hope we addressed all of your concerns and needs. We strive to provide excellent quality care in the Emergency Department. Please rate us as excellent, as anything less than excellent does not meet our expectations. If you feel that you have  not received excellent quality care or timely care, please ask to speak to the nurse manager. Please choose us in the future for your continued health care needs. The exam and treatment you received in the Emergency Department were for an urgent  problem and are not intended as complete care. It is important that you follow up with a doctor, nurse practitioner, or physician assistant for ongoing care.  If your symptoms become worse or you do not improve as expected and you are unable to reach you r usual health care provider, you should return to the Emergency Department. We are available 24 hours a day. Take this sheet with you when you go to your follow-up visit. If you have any problem arranging the follow-up visit, contact the Emergency D epartment immediately. If a prescription has been provided, please have it filled as soon as possible to avoid a delay in treatment. Read the entire medication instruction sheet provided to you by the pharmacy. If you have any questions or reservat ions about taking the medication due to side effects or interactions with other medications please call the ER or your primary care physician. Take this sheet with you when you go to your follow-up visit. Make an appointment with your family doct or or the physician you were referred to for follow-up of this visit, as this is mandatory follow-up. Return to the ER if you are unable to be seen or if you are unable to be seen in a timely manner. If you have any problem arranging the follow-up vi sit, contact the Emergency Department immediately.

## 2018-02-19 NOTE — DISCHARGE INSTRUCTIONS
Upper Respiratory Infection (Cold): Care Instructions  Your Care Instructions    An upper respiratory infection, or URI, is an infection of the nose, sinuses, or throat. URIs are spread by coughs, sneezes, and direct contact. The common cold is the most frequent kind of URI. The flu and sinus infections are other kinds of URIs. Almost all URIs are caused by viruses. Antibiotics won't cure them. But you can treat most infections with home care. This may include drinking lots of fluids and taking over-the-counter pain medicine. You will probably feel better in 4 to 10 days. The doctor has checked you carefully, but problems can develop later. If you notice any problems or new symptoms, get medical treatment right away. Follow-up care is a key part of your treatment and safety. Be sure to make and go to all appointments, and call your doctor if you are having problems. It's also a good idea to know your test results and keep a list of the medicines you take. How can you care for yourself at home? · To prevent dehydration, drink plenty of fluids, enough so that your urine is light yellow or clear like water. Choose water and other caffeine-free clear liquids until you feel better. If you have kidney, heart, or liver disease and have to limit fluids, talk with your doctor before you increase the amount of fluids you drink. · Take an over-the-counter pain medicine, such as acetaminophen (Tylenol), ibuprofen (Advil, Motrin), or naproxen (Aleve). Read and follow all instructions on the label. · Before you use cough and cold medicines, check the label. These medicines may not be safe for young children or for people with certain health problems. · Be careful when taking over-the-counter cold or flu medicines and Tylenol at the same time. Many of these medicines have acetaminophen, which is Tylenol. Read the labels to make sure that you are not taking more than the recommended dose.  Too much acetaminophen (Tylenol) can be harmful. · Get plenty of rest.  · Do not smoke or allow others to smoke around you. If you need help quitting, talk to your doctor about stop-smoking programs and medicines. These can increase your chances of quitting for good. When should you call for help? Call 911 anytime you think you may need emergency care. For example, call if:  ? · You have severe trouble breathing. ?Call your doctor now or seek immediate medical care if:  ? · You seem to be getting much sicker. ? · You have new or worse trouble breathing. ? · You have a new or higher fever. ? · You have a new rash. ? Watch closely for changes in your health, and be sure to contact your doctor if:  ? · You have a new symptom, such as a sore throat, an earache, or sinus pain. ? · You cough more deeply or more often, especially if you notice more mucus or a change in the color of your mucus. ? · You do not get better as expected. Where can you learn more? Go to http://joao-ladi.info/. Enter O309 in the search box to learn more about \"Upper Respiratory Infection (Cold): Care Instructions. \"  Current as of: May 12, 2017  Content Version: 11.4  © 0925-8872 Healthwise, Incorporated. Care instructions adapted under license by [employer] (which disclaims liability or warranty for this information). If you have questions about a medical condition or this instruction, always ask your healthcare professional. [place] [address] any warranty or liability for your use of this information.

## 2018-02-19 NOTE — ED NOTES
Dr. Nate Blanco reviewed discharge instructions with the patient. The patient verbalized understanding. All questions and concerns were addressed. The patient declined a wheelchair and is discharged ambulatory in the care of family members with instructions and prescriptions in hand. Pt is alert and oriented x 4. Respirations are clear and unlabored.

## 2018-02-19 NOTE — LETTER
[address] 
[facility] EMERGENCY DEPT 
[address] [place] [address] [address] [phone] 
[phone] Work/School Note Date: 2018 To Whom It May concern: 
 
Ulisses Pennville was seen and treated today in the emergency room by the following provider(s): 
Attending Provider: Ortiz Lee MD.   
 
Ulisses Pennville may return to work on 18.  
 
Sincerely, 
 
 
 
 
Ortiz Lee MD

## 2018-02-19 NOTE — ED PROVIDER NOTES
EMERGENCY DEPARTMENT HISTORY AND PHYSICAL EXAM      Date: 2018  Patient Name: Melva Rudolph    History of Presenting Illness     Chief Complaint   Patient presents with    Cough     dry cough x 3 nights. pt seen at [facility] and got a chest xray that was negative    Nasal Congestion    Generalized Body Aches       History Provided By: Patient    HPI: Melva Rudolph is a 45 y.o. female, pmhx asthma, who presents ambulatory to the ED c/o persistent non-productive cough and congestion x3 nights. Pt reports associated generalized body aches and hot/cold chills. Pt states her sxs have prevented her from sleeping at night prompting her visit to the ED. Of note, the pt was seen at [facility] on , where she had a negative abdominal CT and workup. Pt states she was reevaluated  for similar sxs and the onset of a cough. Pt was discharged with Hycodan cough syrup, without relief. She reports a recent flu positive contact. Pt denies follow-up with a PCP. Pt specifically denies any fever, shortness of breath, chest pain, abdominal pain, nausea, vomiting, diarrhea, dysuria, or urinary frequency. PCP: None    PMHx: Significant for Arthritis, HTN, Asthma   PSHx: Significant for  Section, Tubal Ligation  Social Hx: +tobacco (1PPD), -EtOH, -Illicit Drugs     There are no other complaints, changes, or physical findings at this time. Current Facility-Administered Medications   Medication Dose Route Frequency Provider Last Rate Last Dose    predniSONE (DELTASONE) tablet 60 mg  60 mg Oral NOW Zarina Spangler MD   Stopped at 18 0406     Current Outpatient Prescriptions   Medication Sig Dispense Refill    methylPREDNISolone (MEDROL, USAMA,) 4 mg tablet Take as directed on packaging 1 Dose Pack 0    doxycycline (VIBRA-TABS) 100 mg tablet Take 1 Tab by mouth two (2) times a day for 7 days. 14 Tab 0    guaiFENesin-dextromethorphan SR (MUCINEX DM) 600-30 mg per tablet Take 1 Tab by mouth two (2) times a day.  15 Tab 0    HYDROcodone-homatropine (HYCODAN) 5-1.5 mg/5 mL (5 mL) syrup Take 5 mL by mouth four (4) times daily. Max Daily Amount: 20 mL. 120 mL 0    ondansetron (ZOFRAN ODT) 4 mg disintegrating tablet Take 1 Tab by mouth every eight (8) hours as needed for Nausea. 10 Tab 0    albuterol (PROVENTIL HFA, VENTOLIN HFA, PROAIR HFA) 90 mcg/actuation inhaler Take 2 Puffs by inhalation every four (4) hours as needed for Wheezing. 1 Inhaler 1    hydroCHLOROthiazide (MICROZIDE) 12.5 mg capsule Take 1 Cap by mouth daily. 30 Cap 0       Past History     Past Medical History:  Past Medical History:   Diagnosis Date    Arthritis     Asthma     bronchitis    Bronchitis     HTN, goal below 140/90 2012    Psychiatric disorder     bipolar       Past Surgical History:  Past Surgical History:   Procedure Laterality Date    DELIVERY       HX HEENT      4 wisdom teeth removed in 2011    HX TUBAL LIGATION      c section       Family History:  Family History   Problem Relation Age of Onset    Hypertension Father     Psychiatric Disorder Father      schzophrenia    Asthma Son     Asthma Son     Asthma Daughter        Social History:  Social History   Substance Use Topics    Smoking status: Current Every Day Smoker     Packs/day: 1.00     Years: 12.00    Smokeless tobacco: Never Used    Alcohol use No       Allergies: Allergies   Allergen Reactions    Codeine Nausea and Vomiting     Pt denies this allergy    Tessalon [Benzonatate] Other (comments)     Sneezing    Tramadol Rash         Review of Systems   Review of Systems   Constitutional: Positive for chills. Negative for fever. HENT: Positive for congestion. Eyes: Negative. Respiratory: Positive for cough. Negative for shortness of breath. Cardiovascular: Negative for chest pain. Gastrointestinal: Negative for abdominal pain, nausea and vomiting. Endocrine: Negative. Genitourinary: Negative.   Negative for difficulty urinating, dysuria and hematuria. Musculoskeletal: Positive for myalgias. Skin: Negative. Allergic/Immunologic: Negative. Neurological: Negative. Psychiatric/Behavioral: Negative for suicidal ideas. All other systems reviewed and are negative. Physical Exam   Physical Exam   Constitutional: She is oriented to person, place, and time. She appears well-developed and well-nourished. No distress. HENT:   Head: Normocephalic and atraumatic. Nose: Nose normal.   Eyes: Conjunctivae and EOM are normal. No scleral icterus. Neck: Normal range of motion. No tracheal deviation present. Cardiovascular: Normal rate, regular rhythm, normal heart sounds and intact distal pulses. Exam reveals no friction rub. No murmur heard. Pulmonary/Chest: Effort normal and breath sounds normal. No stridor. No respiratory distress. She has no wheezes. She has no rales. Pt able to speak in full, unlabored sentences. Abdominal: Soft. Bowel sounds are normal. She exhibits no distension. There is no tenderness. There is no rebound. Musculoskeletal: Normal range of motion. She exhibits no tenderness. Neurological: She is alert and oriented to person, place, and time. No cranial nerve deficit. Skin: Skin is warm and dry. No rash noted. She is not diaphoretic. Psychiatric: She has a normal mood and affect. Her speech is normal and behavior is normal. Judgment and thought content normal. Cognition and memory are normal.   Nursing note and vitals reviewed. Diagnostic Study Results     Labs -   No results found for this or any previous visit (from the past 12 hour(s)). Radiologic Studies -   No orders to display     CT Results  (Last 48 hours)    None        CXR Results  (Last 48 hours)    None            Medical Decision Making   I am the first provider for this patient.     I reviewed the vital signs, available nursing notes, past medical history, past surgical history, family history and social history. Vital Signs-Reviewed the patient's vital signs. Patient Vitals for the past 12 hrs:   Temp Pulse Resp BP SpO2   02/19/18 0332 98.3 °F (36.8 °C) 70 18 (!) 137/93 100 %       Pulse Oximetry Analysis - 100% on RA    Cardiac Monitor:   Rate: 70 bpm  Rhythm: Normal Sinus Rhythm      Records Reviewed: Nursing Notes, Old Medical Records, Previous Radiology Studies and Previous Laboratory Studies    Provider Notes (Medical Decision Making):     DDX:  Denise Elicia, inappropriate use of the ED, doctor shopping    Plan:  reassurance    Impression:  URI    ED Course:   Initial assessment performed. The patients presenting problems have been discussed, and they are in agreement with the care plan formulated and outlined with them. I have encouraged them to ask questions as they arise throughout their visit. I reviewed our electronic medical record system for any past medical records that were available that may contribute to the patients current condition, the nursing notes and and vital signs from today's visit    Nursing notes will be reviewed as they become available in realtime while the pt has been in the ED. Dave Savage MD    I have spent 3-7 minutes discussing the medical risks of prolonged smoking habits and advised the patient of the benefits of the cessation of smoking, providing specific suggestions on how to quit. Dave Savage MD    I personally reviewed pt's imaging. Official read by radiology listed below. Dave Savage MD    PROGRESS NOTE:  4:08 AM  Pt reevaluated. Per nursing staff, pt reports taking a dose of Prednisone at home x0030 this morning. Written by Michell Romo ED Scribe, as dictated by Dave Savage MD      4:12 AM  Progress note:  Pt noted to be feeling better, ready for discharge. Pt will follow up as instructed. All questions have been answered, pt voiced understanding and agreement with plan.      If narcotics were prescribed, pt was advised not to drive or operate heavy machinery. If abx were prescribed, pt advised that diarrhea and rash are possible side effects of the medications. Specific return precautions provided in addition to instructions for pt to return to the ED immediately should sx worsen at any time. Shahla Pérez MD      PLAN:  1. Current Discharge Medication List      START taking these medications    Details   methylPREDNISolone (MEDROL, USAMA,) 4 mg tablet Take as directed on packaging  Qty: 1 Dose Pack, Refills: 0      doxycycline (VIBRA-TABS) 100 mg tablet Take 1 Tab by mouth two (2) times a day for 7 days. Qty: 14 Tab, Refills: 0      guaiFENesin-dextromethorphan SR (MUCINEX DM) 600-30 mg per tablet Take 1 Tab by mouth two (2) times a day. Qty: 14 Tab, Refills: 0           2. Follow-up Information     Follow up With Details Comments Contact Info    [facility] EMERGENCY DEPT  As needed [address] [place] [address]  [phone]        Return to ED if worse     Disposition:    4:10 AM   The patient's results have been reviewed with family and/or caregiver. They verbally convey their understanding and agreement of the patient's signs, symptoms, diagnosis, treatment and prognosis and additionally agree to follow up as recommended in the discharge instructions or to return to the Emergency Room should the patient's condition change prior to their follow-up appointment. The family and/or caregiver verbally agrees with the care-plan and all of their questions have been answered. The discharge instructions have also been provided to the them with educational information regarding the patient's diagnosis as well a list of reasons why the patient would want to return to the ER prior to their follow-up appointment should their condition change. Shahla Pérez MD      Diagnosis     Clinical Impression:   1. Acute upper respiratory infection        Attestations:     This note is prepared by Juanis Gibson, acting as Scribe for Shahla Pérez MD Natalie Noble MD : The scribe's documentation has been prepared under my direction and personally reviewed by me in its entirety. I confirm that the note above accurately reflects all work, treatment, procedures, and medical decision making performed by me. This note will not be viewable in [address]

## 2018-02-21 ENCOUNTER — HOSPITAL ENCOUNTER (EMERGENCY)
Age: 38
Discharge: HOME | End: 2018-02-21
Attending: EMERGENCY MEDICINE
Payer: SELF-PAY

## 2018-02-21 VITALS
TEMPERATURE: 98 F | HEART RATE: 77 BPM | OXYGEN SATURATION: 100 % | WEIGHT: 259 LBS | HEIGHT: 66 IN | RESPIRATION RATE: 15 BRPM | DIASTOLIC BLOOD PRESSURE: 110 MMHG | BODY MASS INDEX: 41.62 KG/M2 | SYSTOLIC BLOOD PRESSURE: 156 MMHG

## 2018-02-21 DIAGNOSIS — M25.561 ACUTE PAIN OF RIGHT KNEE: Primary | ICD-10-CM

## 2018-02-21 PROCEDURE — 99282 EMERGENCY DEPT VISIT SF MDM: CPT

## 2018-02-21 PROCEDURE — 96372 THER/PROPH/DIAG INJ SC/IM: CPT

## 2018-02-21 PROCEDURE — 74011250636 HC RX REV CODE- 250/636: Performed by: PHYSICIAN ASSISTANT

## 2018-02-21 RX ORDER — KETOROLAC TROMETHAMINE 30 MG/ML
30 INJECTION, SOLUTION INTRAMUSCULAR; INTRAVENOUS
Status: COMPLETED | OUTPATIENT
Start: 2018-02-21 | End: 2018-02-21

## 2018-02-21 RX ORDER — DICLOFENAC SODIUM 75 MG/1
75 TABLET, DELAYED RELEASE ORAL 2 TIMES DAILY
Qty: 20 TAB | Refills: 0 | Status: SHIPPED | OUTPATIENT
Start: 2018-02-21 | End: 2018-12-03

## 2018-02-21 RX ADMIN — KETOROLAC TROMETHAMINE 30 MG: 30 INJECTION, SOLUTION INTRAMUSCULAR at 18:50

## 2018-02-21 NOTE — ED PROVIDER NOTES
EMERGENCY DEPARTMENT HISTORY AND PHYSICAL EXAM    Date: 2/21/2018  Patient Name: Flavia Blanton    History of Presenting Illness     Chief Complaint   Patient presents with    Knee Pain     since yesterday, denies fall or injury         History Provided By: Patient    Chief Complaint: knee pain  Duration: 2 Days  Timing:  Gradual  Location: Rt knee  Quality: Aching  Severity: 10 out of 10  Modifying Factors: none  Associated Symptoms: denies any other associated signs or symptoms      HPI: Flavia Blanton is a 45 y.o. female with a PMH of hypertension, osteoarthritis and asthma who presents with rt knee pain x 2 days. NKI. Hx of arthritis. No fever, chills, swelling, numbness, tingling, LROM, redness. PCP: None    Current Facility-Administered Medications   Medication Dose Route Frequency Provider Last Rate Last Dose    ketorolac (TORADOL) injection 30 mg  30 mg IntraMUSCular NOW Marbin High PA-C         Current Outpatient Prescriptions   Medication Sig Dispense Refill    diclofenac EC (VOLTAREN) 75 mg EC tablet Take 1 Tab by mouth two (2) times a day. 20 Tab 0    doxycycline (VIBRA-TABS) 100 mg tablet Take 1 Tab by mouth two (2) times a day for 7 days. 14 Tab 0    methylPREDNISolone (MEDROL, USAMA,) 4 mg tablet Take as directed on packaging 1 Dose Pack 0    dextromethorphan hbr (VICKS 44) 10 mg/5 mL liqd Take 10 mL by mouth every eight (8) hours as needed. 118 mL 0    HYDROcodone-homatropine (HYCODAN) 5-1.5 mg/5 mL (5 mL) syrup Take 5 mL by mouth four (4) times daily. Max Daily Amount: 20 mL. 120 mL 0    ondansetron (ZOFRAN ODT) 4 mg disintegrating tablet Take 1 Tab by mouth every eight (8) hours as needed for Nausea. 10 Tab 0    albuterol (PROVENTIL HFA, VENTOLIN HFA, PROAIR HFA) 90 mcg/actuation inhaler Take 2 Puffs by inhalation every four (4) hours as needed for Wheezing. 1 Inhaler 1    hydroCHLOROthiazide (MICROZIDE) 12.5 mg capsule Take 1 Cap by mouth daily.  30 Cap 0       Past History Past Medical History:  Past Medical History:   Diagnosis Date    Arthritis     Asthma     bronchitis    Bronchitis     HTN, goal below 140/90 2012    Psychiatric disorder     bipolar       Past Surgical History:  Past Surgical History:   Procedure Laterality Date    DELIVERY       HX HEENT      4 wisdom teeth removed in 2011    HX TUBAL LIGATION      c section       Family History:  Family History   Problem Relation Age of Onset    Hypertension Father     Psychiatric Disorder Father      schzophrenia    Asthma Son     Asthma Son     Asthma Daughter        Social History:  Social History   Substance Use Topics    Smoking status: Current Every Day Smoker     Packs/day: 1.00     Years: 12.00    Smokeless tobacco: Never Used    Alcohol use No       Allergies: Allergies   Allergen Reactions    Codeine Nausea and Vomiting     Pt denies this allergy    Guaifenesin Nausea and Vomiting    Tessalon [Benzonatate] Other (comments)     Sneezing    Tramadol Rash         Review of Systems   Review of Systems   Constitutional: Negative for activity change, appetite change, chills, diaphoresis, fatigue and fever. HENT: Negative. Eyes: Negative. Respiratory: Negative. Negative for cough and shortness of breath. Cardiovascular: Negative. Negative for chest pain and leg swelling. Gastrointestinal: Negative. Negative for abdominal pain, diarrhea, nausea and vomiting. Genitourinary: Negative. Musculoskeletal: Positive for arthralgias. Negative for back pain, gait problem, joint swelling, myalgias, neck pain and neck stiffness. Skin: Negative. Negative for color change, pallor, rash and wound. Neurological: Negative. Negative for numbness. Psychiatric/Behavioral: Negative.         Physical Exam     Vitals:    18 1752   BP: (!) 156/110   Pulse: 77   Resp: 15   Temp: 98 °F (36.7 °C)   SpO2: 100%   Weight: 117.5 kg (259 lb)   Height: 5' 6\" (1.676 m)     Physical Exam Constitutional: She is oriented to person, place, and time. She appears well-developed and well-nourished. No distress. HENT:   Head: Normocephalic and atraumatic. Right Ear: External ear normal.   Left Ear: External ear normal.   Nose: Nose normal.   Eyes: Conjunctivae and EOM are normal. Pupils are equal, round, and reactive to light. Neck: Normal range of motion. Neck supple. Cardiovascular: Regular rhythm and intact distal pulses. Pulmonary/Chest: Effort normal.   Abdominal: Soft. Musculoskeletal: She exhibits tenderness. She exhibits no edema or deformity. Right hip: Normal.        Right knee: She exhibits normal range of motion, no swelling, no effusion, no ecchymosis, no deformity, no laceration, no erythema, normal alignment, no LCL laxity, normal patellar mobility, no bony tenderness, normal meniscus and no MCL laxity. Tenderness found. Medial joint line tenderness noted. Left knee: Normal.        Right ankle: Normal.   Neurological: She is alert and oriented to person, place, and time. Skin: Skin is warm and dry. No rash noted. She is not diaphoretic. No erythema. Psychiatric: She has a normal mood and affect. Her behavior is normal. Judgment and thought content normal.   Nursing note and vitals reviewed. Diagnostic Study Results     Labs -   No results found for this or any previous visit (from the past 12 hour(s)). Radiologic Studies -   No orders to display     CT Results  (Last 48 hours)    None        CXR Results  (Last 48 hours)    None            Medical Decision Making   I am the first provider for this patient. I reviewed the vital signs, available nursing notes, past medical history, past surgical history, family history and social history. Vital Signs-Reviewed the patient's vital signs.     Records Reviewed: Nursing Notes, Old Medical Records and Previous Radiology Studies    ED Course:     Disposition:    DISCHARGE NOTE:   6:46 PM      Care plan outlined and precautions discussed. Patient has no new complaints, changes, or physical findings. Results of exam were reviewed with the patient. All medications were reviewed with the patient; will d/c home with NSAID. All of pt's questions and concerns were addressed. Patient was instructed and agrees to follow up with Ortho, as well as to return to the ED upon further deterioration. Patient is ready to go home. Follow-up Information     Follow up With Details Comments [address] [facility] [address] Schedule an appointment as soon as possible for a visit in 1 week As needed, If symptoms worsen [facility]  [address] [facility] [address]  [place] [address]    [address] Schedule an appointment as soon as possible for a visit in 1 week As needed, If symptoms worsen [address] [facility]    [address] [facility] Schedule an appointment as soon as possible for a visit in 1 week As needed, If symptoms worsen [address] [facility]  [address] [phone]  [phone]          Current Discharge Medication List      START taking these medications    Details   diclofenac EC (VOLTAREN) 75 mg EC tablet Take 1 Tab by mouth two (2) times a day. Qty: 20 Tab, Refills: 0         CONTINUE these medications which have NOT CHANGED    Details   doxycycline (VIBRA-TABS) 100 mg tablet Take 1 Tab by mouth two (2) times a day for 7 days. Qty: 14 Tab, Refills: 0      methylPREDNISolone (MEDROL, USAMA,) 4 mg tablet Take as directed on packaging  Qty: 1 Dose Pack, Refills: 0      dextromethorphan hbr (VICKS 44) 10 mg/5 mL liqd Take 10 mL by mouth every eight (8) hours as needed. Qty: 118 mL, Refills: 0      HYDROcodone-homatropine (HYCODAN) 5-1.5 mg/5 mL (5 mL) syrup Take 5 mL by mouth four (4) times daily. Max Daily Amount: 20 mL.   Qty: 120 mL, Refills: 0    Associated Diagnoses: Flu syndrome      ondansetron (ZOFRAN ODT) 4 mg disintegrating tablet Take 1 Tab by mouth every eight (8) hours as needed for Nausea. Qty: 10 Tab, Refills: 0      albuterol (PROVENTIL HFA, VENTOLIN HFA, PROAIR HFA) 90 mcg/actuation inhaler Take 2 Puffs by inhalation every four (4) hours as needed for Wheezing. Qty: 1 Inhaler, Refills: 1      hydroCHLOROthiazide (MICROZIDE) 12.5 mg capsule Take 1 Cap by mouth daily. Qty: 30 Cap, Refills: 0             Provider Notes (Medical Decision Making):   DDx: sprain, strain, arthritis, gout, fx, dislocation (unlikely- NKI)    Procedures:  Procedures        Diagnosis     Clinical Impression:   1.  Acute pain of right knee

## 2018-02-21 NOTE — ED NOTES
Emergency Department Nursing Plan of Care       The Nursing Plan of Care is developed from the Nursing assessment and Emergency Department Attending provider initial evaluation. The plan of care may be reviewed in the ED Provider note.     The Plan of Care was developed with the following considerations:   Patient / Family readiness to learn indicated by:verbalized understanding  Persons(s) to be included in education: patient  Barriers to Learning/Limitations:No    [address] [facility]    2/21/2018   6:46 PM

## 2018-02-21 NOTE — LETTER
[facility] EMERGENCY DEPT 
[address] [facility] [address] [id number] [phone] Work/School Note Date: 2/21/2018 To Whom It May concern: 
 
Ortiz Khalil was seen and treated today in the emergency room by the following provider(s): 
Attending Provider: Job Hui MD 
Physician Assistant: Teetee Merchant PA-C. Ortiz Khalil may return to work on 2/22/2018. Sincerely, Teetee Merchant PA-C

## 2018-02-21 NOTE — LETTER
[facility] EMERGENCY DEPT 
[address] [facility] [address] [id number] [phone] Work/School Note Date: 2/21/2018 To Whom It May concern: 
 
Garcia Pereira was seen and treated today in the emergency room by the following provider(s): 
Attending Provider: Deonte Lanza MD 
Physician Assistant: Carlos Keys PA-C. Garcia Pereira may return to work on 2/23/18.  
 
Sincerely, 
 
 
 
 
Christina Kapoor RN

## 2018-02-21 NOTE — DISCHARGE INSTRUCTIONS
Knee Pain or Injury: Care Instructions  Your Care Instructions    Injuries are a common cause of knee problems. Sudden (acute) injuries may be caused by a direct blow to the knee. They can also be caused by abnormal twisting, bending, or falling on the knee. Pain, bruising, or swelling may be severe, and may start within minutes of the injury. Overuse is another cause of knee pain. Other causes are climbing stairs, kneeling, and other activities that use the knee. Everyday wear and tear, especially as you get older, also can cause knee pain. Rest, along with home treatment, often relieves pain and allows your knee to heal. If you have a serious knee injury, you may need tests and treatment. Follow-up care is a key part of your treatment and safety. Be sure to make and go to all appointments, and call your doctor if you are having problems. It's also a good idea to know your test results and keep a list of the medicines you take. How can you care for yourself at home? · Be safe with medicines. Read and follow all instructions on the label. ¨ If the doctor gave you a prescription medicine for pain, take it as prescribed. ¨ If you are not taking a prescription pain medicine, ask your doctor if you can take an over-the-counter medicine. · Rest and protect your knee. Take a break from any activity that may cause pain. · Put ice or a cold pack on your knee for 10 to 20 minutes at a time. Put a thin cloth between the ice and your skin. · Prop up a sore knee on a pillow when you ice it or anytime you sit or lie down for the next 3 days. Try to keep it above the level of your heart. This will help reduce swelling. · If your knee is not swollen, you can put moist heat, a heating pad, or a warm cloth on your knee. · If your doctor recommends an elastic bandage, sleeve, or other type of support for your knee, wear it as directed.   · Follow your doctor's instructions about how much weight you can put on your leg. Use a cane, crutches, or a walker as instructed. · Follow your doctor's instructions about activity during your healing process. If you can do mild exercise, slowly increase your activity. · Reach and stay at a healthy weight. Extra weight can strain the joints, especially the knees and hips, and make the pain worse. Losing even a few pounds may help. When should you call for help? Call 911 anytime you think you may need emergency care. For example, call if:  ? · You have symptoms of a blood clot in your lung (called a pulmonary embolism). These may include:  ¨ Sudden chest pain. ¨ Trouble breathing. ¨ Coughing up blood. ?Call your doctor now or seek immediate medical care if:  ? · You have severe or increasing pain. ? · Your leg or foot turns cold or changes color. ? · You cannot stand or put weight on your knee. ? · Your knee looks twisted or bent out of shape. ? · You cannot move your knee. ? · You have signs of infection, such as:  ¨ Increased pain, swelling, warmth, or redness. ¨ Red streaks leading from the knee. ¨ Pus draining from a place on your knee. ¨ A fever. ? · You have signs of a blood clot in your leg (called a deep vein thrombosis), such as:  ¨ Pain in your calf, back of the knee, thigh, or groin. ¨ Redness and swelling in your leg or groin. ? Watch closely for changes in your health, and be sure to contact your doctor if:  ? · You have tingling, weakness, or numbness in your knee. ? · You have any new symptoms, such as swelling. ? · You have bruises from a knee injury that last longer than 2 weeks. ? · You do not get better as expected. Where can you learn more? Go to http://joao-ladi.info/. Enter K195 in the search box to learn more about \"Knee Pain or Injury: Care Instructions. \"  Current as of: March 20, 2017  Content Version: 11.4  © 2414-1073 [employer].  Care instructions adapted under license by [employer] (which disclaims liability or warranty for this information). If you have questions about a medical condition or this instruction, always ask your healthcare professional. [address] any warranty or liability for your use of this information.

## 2018-04-24 ENCOUNTER — HOSPITAL ENCOUNTER (EMERGENCY)
Age: 38
Discharge: HOME | End: 2018-04-24
Attending: EMERGENCY MEDICINE
Payer: SELF-PAY

## 2018-04-24 VITALS
RESPIRATION RATE: 20 BRPM | WEIGHT: 258.6 LBS | BODY MASS INDEX: 41.56 KG/M2 | SYSTOLIC BLOOD PRESSURE: 134 MMHG | DIASTOLIC BLOOD PRESSURE: 83 MMHG | HEART RATE: 74 BPM | HEIGHT: 66 IN | OXYGEN SATURATION: 100 % | TEMPERATURE: 98.1 F

## 2018-04-24 DIAGNOSIS — J45.909 MILD ASTHMA, UNSPECIFIED WHETHER COMPLICATED, UNSPECIFIED WHETHER PERSISTENT: ICD-10-CM

## 2018-04-24 DIAGNOSIS — J20.9 ACUTE BRONCHITIS, UNSPECIFIED ORGANISM: Primary | ICD-10-CM

## 2018-04-24 DIAGNOSIS — F17.200 SMOKING ADDICTION: ICD-10-CM

## 2018-04-24 DIAGNOSIS — Z76.5 DRUG-SEEKING BEHAVIOR: ICD-10-CM

## 2018-04-24 PROCEDURE — 99281 EMR DPT VST MAYX REQ PHY/QHP: CPT

## 2018-04-24 RX ORDER — CETIRIZINE HCL 10 MG
10 TABLET ORAL DAILY
Qty: 20 TAB | Refills: 0 | Status: SHIPPED | OUTPATIENT
Start: 2018-04-24 | End: 2019-04-29

## 2018-04-24 RX ORDER — ALBUTEROL SULFATE 90 UG/1
2 AEROSOL, METERED RESPIRATORY (INHALATION)
Qty: 1 INHALER | Refills: 1 | Status: SHIPPED | OUTPATIENT
Start: 2018-04-24 | End: 2018-12-17

## 2018-04-24 RX ORDER — FLUTICASONE PROPIONATE 50 MCG
2 SPRAY, SUSPENSION (ML) NASAL DAILY
Qty: 1 BOTTLE | Refills: 0 | Status: SHIPPED | OUTPATIENT
Start: 2018-04-24 | End: 2019-04-29

## 2018-04-24 RX ORDER — ALBUTEROL SULFATE 90 UG/1
2 AEROSOL, METERED RESPIRATORY (INHALATION)
Qty: 1 INHALER | Refills: 0 | Status: SHIPPED | OUTPATIENT
Start: 2018-04-24 | End: 2018-12-17

## 2018-04-24 RX ORDER — PREDNISONE 5 MG/1
TABLET ORAL
Qty: 21 TAB | Refills: 0 | Status: SHIPPED | OUTPATIENT
Start: 2018-04-24 | End: 2018-12-03

## 2018-04-24 NOTE — ED NOTES
Patient refused prescription by Dr. Amanda Kemp spoke with patient. Patient given referral to outpatient clinics.     No new prescriptions given by Dr. Darius Kemp

## 2018-04-24 NOTE — DISCHARGE INSTRUCTIONS
Asthma in Adults: Care Instructions  Your Care Instructions    During an asthma attack, your airways swell and narrow as a reaction to certain things (triggers). This makes it hard to breathe. You may be able to prevent asthma attacks if you avoid the things that set off your asthma symptoms. Keeping your asthma under control and treating symptoms before they get bad can help you avoid severe attacks. If you can control your asthma, you may be able to do all of your normal daily activities. You may also avoid asthma attacks and trips to the hospital.  Follow-up care is a key part of your treatment and safety. Be sure to make and go to all appointments, and call your doctor if you are having problems. It's also a good idea to know your test results and keep a list of the medicines you take. How can you care for yourself at home? · Follow your asthma action plan so you can manage your symptoms at home. An asthma action plan will help you prevent and control airway reactions and will tell you what to do during an asthma attack. If you do not have an asthma action plan, work with your doctor to build one. · Take your asthma medicine exactly as prescribed. Medicine plays an important role in controlling asthma. Talk to your doctor right away if you have any questions about what to take and how to take it. ¨ Use your quick-relief medicine when you have symptoms of an attack. Quick-relief medicine often is an albuterol inhaler. Some people need to use quick-relief medicine before they exercise. ¨ Take your controller medicine every day, not just when you have symptoms. Controller medicine is usually an inhaled corticosteroid. The goal is to prevent problems before they occur. Do not use your controller medicine to try to treat an attack that has already started. It does not work fast enough to help. ¨ If your doctor prescribed corticosteroid pills to use during an attack, take them as directed.  They may take hours to work, but they may shorten the attack and help you breathe better. ¨ Keep your quick-relief medicine with you at all times. · Talk to your doctor before using other medicines. Some medicines, such as aspirin, can cause asthma attacks in some people. · Check yourself for asthma symptoms to know which step to follow in your action plan. Watch for things like being short of breath, having chest tightness, coughing, and wheezing. Also notice if symptoms wake you up at night or if you get tired quickly when you exercise. · If you have a peak flow meter, use it to check how well you are breathing. This can help you predict when an asthma attack is going to occur. Then you can take medicine to prevent the asthma attack or make it less severe. · See your doctor regularly. These visits will help you learn more about asthma and what you can do to control it. Your doctor will monitor your treatment to make sure the medicine is helping you. · Keep track of your asthma attacks and your treatment. After you have had an attack, write down what triggered it, what helped end it, and any concerns you have about your asthma action plan. Take your diary when you see your doctor. You can then review your asthma action plan and decide if it is working. · Do not smoke or allow others to smoke around you. Avoid smoky places. Smoking makes asthma worse. If you need help quitting, talk to your doctor about stop-smoking programs and medicines. These can increase your chances of quitting for good. · Learn what triggers an asthma attack for you, and avoid the triggers when you can. Common triggers include colds, smoke, air pollution, dust, pollen, mold, pets, cockroaches, stress, and cold air. · Avoid colds and the flu. Get a pneumococcal vaccine shot. If you have had one before, ask your doctor whether you need a second dose. Get a flu vaccine every fall.  If you must be around people with colds or the flu, wash your hands often.  When should you call for help? Call 911 anytime you think you may need emergency care. For example, call if:  ? · You have severe trouble breathing. ?Call your doctor now or seek immediate medical care if:  ? · Your symptoms do not get better after you have followed your asthma action plan. ? · You cough up yellow, dark brown, or bloody mucus (sputum). ? Watch closely for changes in your health, and be sure to contact your doctor if:  ? · Your coughing and wheezing get worse. ? · You need to use quick-relief medicine on more than 2 days a week (unless it is just for exercise). ? · You need help figuring out what is triggering your asthma attacks. Where can you learn more? Go to http://joao-ladi.info/. Enter P597 in the search box to learn more about \"Asthma in Adults: Care Instructions. \"  Current as of: May 12, 2017  Content Version: 11.4  © 9879-2838 [employer]. Care instructions adapted under license by [employer] (which disclaims liability or warranty for this information). If you have questions about a medical condition or this instruction, always ask your healthcare professional. [place] [address] any warranty or liability for your use of this information. Allergies: Care Instructions  Your Care Instructions    Allergies occur when your body's defense system (immune system) overreacts to certain substances. The immune system treats a harmless substance as if it were a harmful germ or virus. Many things can cause this overreaction, including pollens, medicine, food, dust, animal dander, and mold. Allergies can be mild or severe. Mild allergies can be managed with home treatment. But medicine may be needed to prevent problems. Managing your allergies is an important part of staying healthy. Your doctor may suggest that you have allergy testing to help find out what is causing your allergies.  When you know what things trigger your symptoms, you can avoid them. This can prevent allergy symptoms and other health problems. For severe allergies that cause reactions that affect your whole body (anaphylactic reactions), your doctor may prescribe a shot of epinephrine to carry with you in case you have a severe reaction. Learn how to give yourself the shot and keep it with you at all times. Make sure it is not . Follow-up care is a key part of your treatment and safety. Be sure to make and go to all appointments, and call your doctor if you are having problems. It's also a good idea to know your test results and keep a list of the medicines you take. How can you care for yourself at home? · If you have been told by your doctor that dust or dust mites are causing your allergy, decrease the dust around your bed:  [facility] sheets, pillowcases, and other bedding in hot water every week. ¨ Use dust-proof covers for pillows, duvets, and mattresses. Avoid plastic covers because they tear easily and do not \"breathe. \" Wash as instructed on the label. ¨ Do not use any blankets and pillows that you do not need. ¨ Use blankets that you can wash in your washing machine. ¨ Consider removing drapes and carpets, which attract and hold dust, from your bedroom. · If you are allergic to house dust and mites, do not use home humidifiers. Your doctor can suggest ways you can control dust and mites. · Look for signs of cockroaches. Cockroaches cause allergic reactions. Use cockroach baits to get rid of them. Then, clean your home well. Cockroaches like areas where grocery bags, newspapers, empty bottles, or cardboard boxes are stored. Do not keep these inside your home, and keep trash and food containers sealed. Seal off any spots where cockroaches might enter your home. · If you are allergic to mold, get rid of furniture, rugs, and drapes that smell musty. Check for mold in the bathroom.   · If you are allergic to outdoor pollen or mold spores, use air-conditioning. Change or clean all filters every month. Keep windows closed. · If you are allergic to pollen, stay inside when pollen counts are high. Use a vacuum  with a HEPA filter or a double-thickness filter at least two times each week. · Stay inside when air pollution is bad. Avoid paint fumes, perfumes, and other strong odors. · Avoid conditions that make your allergies worse. Stay away from smoke. Do not smoke or let anyone else smoke in your house. Do not use fireplaces or wood-burning stoves. · If you are allergic to your pets, change the air filter in your furnace every month. Use high-efficiency filters. · If you are allergic to pet dander, keep pets outside or out of your bedroom. Old carpet and cloth furniture can hold a lot of animal dander. You may need to replace them. When should you call for help? Give an epinephrine shot if:  ? · You think you are having a severe allergic reaction. ? · You have symptoms in more than one body area, such as mild nausea and an itchy mouth. ? After giving an epinephrine shot call 911, even if you feel better. ?Call 911 if:  ? · You have symptoms of a severe allergic reaction. These may include:  ¨ Sudden raised, red areas (hives) all over your body. ¨ Swelling of the throat, mouth, lips, or tongue. ¨ Trouble breathing. ¨ Passing out (losing consciousness). Or you may feel very lightheaded or suddenly feel weak, confused, or restless. ? · You have been given an epinephrine shot, even if you feel better. ?Call your doctor now or seek immediate medical care if:  ? · You have symptoms of an allergic reaction, such as:  ¨ A rash or hives (raised, red areas on the skin). ¨ Itching. ¨ Swelling. ¨ Belly pain, nausea, or vomiting. ? Watch closely for changes in your health, and be sure to contact your doctor if:  ? · You do not get better as expected. Where can you learn more?   Go to http://joao-ladi.info/. Enter B874 in the search box to learn more about \"Allergies: Care Instructions. \"  Current as of: September 29, 2016  Content Version: 11.4  © 3228-0521 [employer]. Care instructions adapted under license by [employer] (which disclaims liability or warranty for this information). If you have questions about a medical condition or this instruction, always ask your healthcare professional. [address] any warranty or liability for your use of this information. Learning About Benefits From Quitting Smoking  How does quitting smoking make you healthier? If you're thinking about quitting smoking, you may have a few reasons to be smoke-free. Your health may be one of them. · When you quit smoking, you lower your risks for cancer, lung disease, heart attack, stroke, blood vessel disease, and blindness from macular degeneration. · When you're smoke-free, you get sick less often, and you heal faster. You are less likely to get colds, flu, bronchitis, and pneumonia. · As a nonsmoker, you may find that your mood is better and you are less stressed. When and how will you feel healthier? Quitting has real health benefits that start from day 1 of being smoke-free. And the longer you stay smoke-free, the healthier you get and the better you feel. The first hours  · After just 20 minutes, your blood pressure and heart rate go down. That means there's less stress on your heart and blood vessels. · Within 12 hours, the level of carbon monoxide in your blood drops back to normal. That makes room for more oxygen. With more oxygen in your body, you may notice that you have more energy than when you smoked. After 2 weeks  · Your lungs start to work better. · Your risk of heart attack starts to drop. After 1 month  · When your lungs are clear, you cough less and breathe deeper, so it's easier to be active.   · Your sense of taste and smell return. That means you can enjoy food more than you have since you started smoking. Over the years  · After 1 year, your risk of heart disease is half what it would be if you kept smoking. · After 5 years, your risk of stroke starts to shrink. Within a few years after that, it's about the same as if you'd never smoked. · After 10 years, your risk of dying from lung cancer is cut by about half. And your risk for many other types of cancer is lower too. How would quitting help others in your life? When you quit smoking, you improve the health of everyone who now breathes in your smoke. · Their heart, lung, and cancer risks drop, much like yours. · They are sick less. For babies and small children, living smoke-free means they're less likely to have ear infections, pneumonia, and bronchitis. · If you're a woman who is or will be pregnant someday, quitting smoking means a healthier . · Children who are close to you are less likely to become adult smokers. Where can you learn more? Go to http://joao-ladi.info/. Enter [id number] [id number] in the search box to learn more about \"Learning About Benefits From Quitting Smoking. \"  Current as of: 2017  Content Version: 11.4  © 9738-7373 [employer]. Care instructions adapted under license by [employer] (which disclaims liability or warranty for this information). If you have questions about a medical condition or this instruction, always ask your healthcare professional. [place] [address] any warranty or liability for your use of this information.

## 2018-04-24 NOTE — ED NOTES
Dr Sharath Armenta at bedside talking with pt; pt agitated about not receive narcotics and demanding to see another doctor

## 2018-04-24 NOTE — ED PROVIDER NOTES
EMERGENCY DEPARTMENT HISTORY AND PHYSICAL EXAM          Date: 2018  Patient Name: Robson Austin    History of Presenting Illness     Chief Complaint   Patient presents with    Cough     congetion x 3 days       History Provided By: Patient     HPI: Robson Austin is a 45 y.o. female, pmhx bronchitis / asthma, who presents ambulatory to the ED c/o gradually worsening dry cough and congestion x 3 days. Pt notes a hx of similar sxs with her previous asthma exacerbations. She reports having a nebulizer and rescue inhaler at home with filled medications, but denies any relief of sxs. Pt additionally notes she has taken a few tablets of an \"old Prednisone bottle I found\" with no relief. She endorses smoking 1 ppd, but notes \"I haven't been able to smoke as much lately because of my cough. \" Pt states she recently switched insurance policies and has not yet followed up with a new PCP. She notes her LNMP was 18 and regular per her baseline. Pt otherwise specifically denies any recent fever, chills, nausea, vomiting, diarrhea, abd pain, CP, SOB, lightheadedness, dizziness, numbness, weakness, tingling, BLE swelling, HA, heart palpitations, urinary sxs, changes in BM, changes in PO intake, melena, or hematochezia. PCP: None    PMHx: Significant for bronchitis, arthritis, HTN, asthma, bipolar  PSHx: Significant for , tubal ligation  Social Hx: +tobacco (1 ppd), -EtOH, -Illicit Drugs    There are no other complaints, changes, or physical findings at this time. Current Outpatient Prescriptions   Medication Sig Dispense Refill    albuterol (PROVENTIL HFA, VENTOLIN HFA, PROAIR HFA) 90 mcg/actuation inhaler Take 2 Puffs by inhalation every four (4) hours as needed for Wheezing. 1 Inhaler 1    albuterol (PROVENTIL HFA, VENTOLIN HFA, PROAIR HFA) 90 mcg/actuation inhaler Take 2 Puffs by inhalation every four (4) hours as needed for Wheezing.  1 Inhaler 0    cetirizine (ZYRTEC) 10 mg tablet Take 1 Tab by mouth daily. 20 Tab 0    predniSONE (STERAPRED) 5 mg dose pack See administration instruction per 5mg dose pack 21 Tab 0    fluticasone (FLONASE) 50 mcg/actuation nasal spray 2 Sprays by Both Nostrils route daily. 1 Bottle 0    dextromethorphan-guaiFENesin (ROBITUSSIN-DM)  mg/5 mL syrup Take 10 mL by mouth every six (6) hours as needed for Cough. 120 mL 0    diclofenac EC (VOLTAREN) 75 mg EC tablet Take 1 Tab by mouth two (2) times a day. 20 Tab 0    methylPREDNISolone (MEDROL, USAMA,) 4 mg tablet Take as directed on packaging 1 Dose Pack 0    dextromethorphan hbr (VICKS 44) 10 mg/5 mL liqd Take 10 mL by mouth every eight (8) hours as needed. 118 mL 0    HYDROcodone-homatropine (HYCODAN) 5-1.5 mg/5 mL (5 mL) syrup Take 5 mL by mouth four (4) times daily. Max Daily Amount: 20 mL. 120 mL 0    ondansetron (ZOFRAN ODT) 4 mg disintegrating tablet Take 1 Tab by mouth every eight (8) hours as needed for Nausea. 10 Tab 0    hydroCHLOROthiazide (MICROZIDE) 12.5 mg capsule Take 1 Cap by mouth daily. 30 Cap 0       Past History     Past Medical History:  Past Medical History:   Diagnosis Date    Arthritis     Asthma     bronchitis    Bronchitis     HTN, goal below 140/90 2012    Psychiatric disorder     bipolar       Past Surgical History:  Past Surgical History:   Procedure Laterality Date    DELIVERY       HX HEENT      4 wisdom teeth removed in 2011    HX TUBAL LIGATION      c section       Family History:  Family History   Problem Relation Age of Onset    Hypertension Father     Psychiatric Disorder Father      schzophrenia    Asthma Son     Asthma Son     Asthma Daughter        Social History:  Social History   Substance Use Topics    Smoking status: Current Every Day Smoker     Packs/day: 1.00     Years: 12.00    Smokeless tobacco: Never Used    Alcohol use No       Allergies:   Allergies   Allergen Reactions    Codeine Nausea and Vomiting     Pt denies this allergy    Guaifenesin Nausea and Vomiting    Tessalon [Benzonatate] Other (comments)     Sneezing    Tramadol Rash         Review of Systems   Review of Systems   Constitutional: Negative for chills and fever. HENT: Positive for congestion. Negative for ear pain, rhinorrhea and sore throat. Respiratory: Positive for cough. Negative for shortness of breath. Cardiovascular: Negative for chest pain, palpitations and leg swelling. Gastrointestinal: Negative for abdominal pain, constipation, diarrhea, nausea and vomiting. No melena  No hematochezia   Endocrine: Negative for polyuria. Genitourinary: Negative for dysuria, frequency and hematuria. Neurological: Negative for dizziness, weakness, light-headedness, numbness and headaches. No tingling   All other systems reviewed and are negative. Physical Exam   Physical Exam   Nursing note and vitals reviewed. General appearance - obese, well appearing, and in no distress, smells of tobacco  Eyes - pupils equal and reactive, extraocular eye movements intact  ENT - mucous membranes moist, pharynx normal without lesions  Neck - supple, no significant adenopathy; non-tender to palpation  Chest - clear to auscultation, no active wheezes, rales or rhonchi; non-tender to palpation  Heart - normal rate and regular rhythm, S1 and S2 normal, no murmurs noted  Abdomen - soft, nontender, nondistended, no masses or organomegaly  Musculoskeletal - no joint tenderness, deformity or swelling; normal ROM  Extremities - peripheral pulses normal, no pedal edema  Skin - normal coloration and turgor, no rashes  Neurological - alert, oriented x3, normal speech, no focal findings or movement disorder noted  Written by CORKY Sheppard, as dictated by Cam Hale MD      Medical Decision Making   I am the first provider for this patient.     I reviewed the vital signs, available nursing notes, past medical history, past surgical history, family history and social history. Vital Signs-Reviewed the patient's vital signs. Patient Vitals for the past 12 hrs:   Temp Pulse Resp BP SpO2   04/24/18 0514 98.1 °F (36.7 °C) 74 20 134/83 100 %       Records Reviewed: Nursing Notes and Old Medical Records    Provider Notes (Medical Decision Making):     DDx: asthma exacerbation, bronchitis, URI, seasonal allergies, drug seeking behavior, nicotine dependence    ED Course:   Initial assessment performed. The patients presenting problems have been discussed, and they are in agreement with the care plan formulated and outlined with them. I have encouraged them to ask questions as they arise throughout their visit. 5:47 AM  Spent 3-5 minutes discussing the risks of smoking and the benefits of smoking cessation as well as the long term sequelae of smoking with the pt who verbalized her understanding. Reviewed strategies for success, including gradually decreasing the number of cigarettes smoked a day. Written by CORKY Fernandesibe, as dictated by Carmelo Mccray MD     PROGRESS NOTE:  5:51 AM  Pt reevaluated. Pt states she took a breathing treatment just PTA in the ED. She defers additional tx at this time and reports \"I can just take another breathing tx when I get home. \" Pt states, although she has an old rx for Prednisone at home \"it doesn't help me. Tussionex gets rid of it all, and I'm off for the next 4 days so I want this to get better. \"   Written by CORKY Fernandesibe, as dictated by Carmelo Mccray MD    PROGRESS NOTE:  6:09 AM  Pt reevaluated. Attempted to discharge pt at this time. She requests rx for Tussionex specifically and is requesting to speak with another physician at this time. Written by CORKY Fernandes, as dictated by Carmelo Mccray MD    PROGRESS NOTE:  Gracie Rivera MD evaluated pt in the ED.  Pt again reiterates that she has a chronic cough and continues to smoke against previous recommendations from multiple physicians. Pt advised to follow up with PCP or free clinic from list provided. Pt requesting Z-kenya, discussed use of ABX not advised at this time given low suspicion for infection vs reactive airway disease. Written by [facility], ED Scribe, as dictated by Dipesh Hamlin MD    Progress note:  6:19 AM  Pt ready for discharge. Will follow up as instructed. All questions have been answered, pt voiced understanding and agreement with plan. Specific return precautions provided as well as instructions to return to the ED should sx worsen at any time. Vital signs stable for discharge. Written by [facility], ED Scribe, as dictated by Atiya Small MD    Diagnosis     Clinical Impression:   1. Acute bronchitis, unspecified organism    2. Smoking addiction    3. Mild asthma, unspecified whether complicated, unspecified whether persistent    4. Drug-seeking behavior        PLAN:  1. Discharge Medication List as of 4/24/2018  5:58 AM      START taking these medications    Details   cetirizine (ZYRTEC) 10 mg tablet Take 1 Tab by mouth daily. , Print, Disp-20 Tab, R-0      predniSONE (STERAPRED) 5 mg dose pack See administration instruction per 5mg dose pack, Print, Disp-21 Tab, R-0      fluticasone (FLONASE) 50 mcg/actuation nasal spray 2 Sprays by Both Nostrils route daily. , Print, Disp-1 Bottle, R-0      dextromethorphan-guaiFENesin (ROBITUSSIN-DM)  mg/5 mL syrup Take 10 mL by mouth every six (6) hours as needed for Cough. , Print, Disp-120 mL, R-0         CONTINUE these medications which have CHANGED    Details   !! albuterol (PROVENTIL HFA, VENTOLIN HFA, PROAIR HFA) 90 mcg/actuation inhaler Take 2 Puffs by inhalation every four (4) hours as needed for Wheezing., Print, Disp-1 Inhaler, R-1      !! albuterol (PROVENTIL HFA, VENTOLIN HFA, PROAIR HFA) 90 mcg/actuation inhaler Take 2 Puffs by inhalation every four (4) hours as needed for Wheezing., Print, Disp-1 Inhaler, R-0       !! - Potential duplicate medications found. Please discuss with provider. CONTINUE these medications which have NOT CHANGED    Details   diclofenac EC (VOLTAREN) 75 mg EC tablet Take 1 Tab by mouth two (2) times a day., Normal, Disp-20 Tab, R-0      methylPREDNISolone (MEDROL, USAMA,) 4 mg tablet Take as directed on packaging, Normal, Disp-1 Dose Pack, R-0      dextromethorphan hbr (VICKS 44) 10 mg/5 mL liqd Take 10 mL by mouth every eight (8) hours as needed., Normal, Disp-118 mL, R-0      HYDROcodone-homatropine (HYCODAN) 5-1.5 mg/5 mL (5 mL) syrup Take 5 mL by mouth four (4) times daily. Max Daily Amount: 20 mL., Print, Disp-120 mL, R-0      ondansetron (ZOFRAN ODT) 4 mg disintegrating tablet Take 1 Tab by mouth every eight (8) hours as needed for Nausea., Normal, Disp-10 Tab, R-0      hydroCHLOROthiazide (MICROZIDE) 12.5 mg capsule Take 1 Cap by mouth daily. , Normal, Disp-30 Cap, R-0           2. Follow-up Information     Follow up With Details Comments Contact Info    [facility] EMERGENCY DEPT  If symptoms worsen [address] [place] [address]  [phone]        Return to ED if worse     Disposition:    DISCHARGE NOTE:  6:19 AM  The patient's results have been reviewed with family and/or caregiver. They verbally convey their understanding and agreement of the patient's signs, symptoms, diagnosis, treatment, and prognosis. They additionally agree to follow up as recommended in the discharge instructions or to return to the Emergency Room should the patient's condition change prior to their follow-up appointment. The family and/or caregiver verbally agrees with the care-plan and all of their questions have been answered. The discharge instructions have also been provided to the them along with educational information regarding the patient's diagnosis and a list of reasons why the patient would want to return to the ER prior to their follow-up appointment should their condition change.   Written by Rance Cooks, ED Scribe, as dictated by Atiya Small MD.             Attestations: This note is prepared by Rance Cooks, acting as Scribe for MD tAiya Gutierrez MD : The scribe's documentation has been prepared under my direction and personally reviewed by me in its entirety. I confirm that the note above accurately reflects all work, treatment, procedures, and medical decision making performed by me. This note will not be viewable in [address]

## 2018-04-24 NOTE — ED NOTES
Received patient to exam room, sitting in a chair in a position of comfort, call bell within reach; pt reports 3 days of cough/cold/congestion with chills

## 2018-05-15 ENCOUNTER — DOCUMENTATION ONLY (OUTPATIENT)
Dept: CASE MANAGEMENT | Age: 38
End: 2018-05-15

## 2018-05-15 NOTE — MANAGEMENT PLAN
Patient Management Plan        Pt Name: Rosco Boas FTF:[id number]        Management Plan by: Pradip Andre, RN,Jess Christensen, PhD, RN, CS Cindy Puente, RN and ED High Utilizers Team                                                                                           Date Placed:  05/15/18     Pt's Physicians:         Primary Care:  None      Specialists:      Unknown                    Summary    Reason for Referral: This patient has been provided a management plan for Appropriate use of ED and establish PCP relationship. Patient with frequent visits for: minor complaints Chronic Cough, URIs. Warning/Safety Alert:     Pt has become agitated when not given narcotics; consider have security nearby. :   Prescriptions:  18    Prescribers:  10  Pharmacies:  6  Pt scored a 34 on the VOA ([employer] Opioid Advisor). This score indicates a 55.1% probability of OD or Resp. Dep. Situation: Chronic Conditions Summary -     Root Cause Medical Problems List:  Asthma, Tobacco abuse  Root Cause Psychosocial Problems List: RACHELL (previous UDS + for amphetamines, benzos, THC, methadone), Depression with SI, Anxiety  Root Cause Social Determinants of Health (SDOH): Unstable living condition    Incidence of Testing: Over past 12 months: 14 XR,   6 CT,   0 MRI      Pattern of Access:  Over past 12 months: 15 [facility] ED visits,  [facility],  2 ED visits [facility]. Sporadic times (during the day). 0 hospitalizations. Goals/Interventions:      ED Provider/nursing(together) to discuss mgmt. plan w/ pt   Nursing to obtain ordered UDS (2/2018 UDS +THC:   Hx of + UDS for amphetamines, benzos, and methadone (2013).  Nursing/CM obtain updated list of current providers and attempt to obtain JONATAN for coordination of care    Consider non-narcotic medications/alternatives to benzodiazepines unless emergently necessary - Pt specifically asks for narcotic cough syrup.     Educate patient on the appropriate use of ED. Provide UC and [facility] [place] information   Provide pt with High ED utilizer letter and [facility] Opioid Prescribing Guidelines     During Business Hours: CM   After Hours: BSMART(only if pt in crisis)     Challenges for Self Management:      Impairment of cognition and/or functional limitations:  8th grade education level   Financial Constraints: Unknown   Utilization: High ED Utilization   Emotional status of Patient: Guarded (hx of suicidal ideation/attempt)   Behavioral Health Factors:  Becomes agitated when not given what she asks for (ie Tussionex)    Motivational level: Likely poor    Transportation: Prior to arranging Medicaid transport, ask pt to call family member for ride home, if pt cannot locate ride, staff to call emergency contact and request ride for pt. Medication Management: Complications from poor adherence, Poor Adherence     Advanced Care Plan: None              ** This plan has been created by the Care Coordination Committee, a multi-disciplinary team. The patient and their physician were invited to participate in this plan. This management plan is intended to provide consistent evaluation and treatment for this patient not to supersede physician judgment. **

## 2018-12-03 ENCOUNTER — HOSPITAL ENCOUNTER (EMERGENCY)
Age: 38
Discharge: HOME | End: 2018-12-03
Attending: EMERGENCY MEDICINE
Payer: SELF-PAY

## 2018-12-03 VITALS
TEMPERATURE: 97.7 F | HEIGHT: 66 IN | OXYGEN SATURATION: 99 % | HEART RATE: 79 BPM | SYSTOLIC BLOOD PRESSURE: 120 MMHG | DIASTOLIC BLOOD PRESSURE: 79 MMHG | WEIGHT: 250 LBS | RESPIRATION RATE: 17 BRPM | BODY MASS INDEX: 40.18 KG/M2

## 2018-12-03 DIAGNOSIS — L03.211 FACIAL CELLULITIS: Primary | ICD-10-CM

## 2018-12-03 PROCEDURE — 99282 EMERGENCY DEPT VISIT SF MDM: CPT

## 2018-12-03 RX ORDER — SULFAMETHOXAZOLE AND TRIMETHOPRIM 800; 160 MG/1; MG/1
2 TABLET ORAL 2 TIMES DAILY
Qty: 28 TAB | Refills: 0 | Status: SHIPPED | OUTPATIENT
Start: 2018-12-03 | End: 2018-12-10

## 2018-12-03 RX ORDER — IBUPROFEN 800 MG/1
800 TABLET ORAL
Qty: 20 TAB | Refills: 0 | Status: SHIPPED | OUTPATIENT
Start: 2018-12-03 | End: 2018-12-10

## 2018-12-03 NOTE — LETTER
[facility] EMERGENCY DEPT 
[address] [facility] [id number] [phone] Work/School Note Date: 12/3/2018 To Whom It May concern: 
 
Rayo Keating was seen and treated today in the emergency room by the following provider(s): 
Attending Provider: Juan Carlos Soto MD 
Physician Assistant: Maritza Leach PA-C. Rayo Keating may return to work on 12/5/18. Sincerely, Aurelio Madrigal PA-C

## 2018-12-03 NOTE — DISCHARGE INSTRUCTIONS

## 2018-12-03 NOTE — ED PROVIDER NOTES
EMERGENCY DEPARTMENT HISTORY AND PHYSICAL EXAM 
 
Date: 12/3/2018 Patient Name: Kurt Bazan History of Presenting Illness Chief Complaint Patient presents with  
 Skin Problem History Provided By: Patient HPI: Kurt Bazan is a 45 y.o. female with a PMH of bronchitis, asthma, bipolar, and HTN who presents with facial swelling and redness x 2 days. Pt reports old scar to the face but states swelling and redness is new. Pt unsure if something bit her. Pt reports pain 8/10 and aching. PCP: None Current Outpatient Medications Medication Sig Dispense Refill  trimethoprim-sulfamethoxazole (BACTRIM DS) 160-800 mg per tablet Take 2 Tabs by mouth two (2) times a day for 7 days. 28 Tab 0  ibuprofen (MOTRIN) 800 mg tablet Take 1 Tab by mouth every six (6) hours as needed for Pain for up to 7 days. 20 Tab 0  
 albuterol (PROVENTIL HFA, VENTOLIN HFA, PROAIR HFA) 90 mcg/actuation inhaler Take 2 Puffs by inhalation every four (4) hours as needed for Wheezing. 1 Inhaler 1  
 albuterol (PROVENTIL HFA, VENTOLIN HFA, PROAIR HFA) 90 mcg/actuation inhaler Take 2 Puffs by inhalation every four (4) hours as needed for Wheezing. 1 Inhaler 0  
 cetirizine (ZYRTEC) 10 mg tablet Take 1 Tab by mouth daily. 20 Tab 0  
 fluticasone (FLONASE) 50 mcg/actuation nasal spray 2 Sprays by Both Nostrils route daily. 1 Bottle 0  
 dextromethorphan-guaiFENesin (ROBITUSSIN-DM)  mg/5 mL syrup Take 10 mL by mouth every six (6) hours as needed for Cough. 120 mL 0  
 hydroCHLOROthiazide (MICROZIDE) 12.5 mg capsule Take 1 Cap by mouth daily. 30 Cap 0 Past History Past Medical History: 
Past Medical History:  
Diagnosis Date  Arthritis  Asthma   
 bronchitis  Bronchitis  HTN, goal below 140/90 2012  Psychiatric disorder   
 bipolar Past Surgical History: 
Past Surgical History:  
Procedure Laterality Date  DELIVERY     
 HX HEENT    
 4 wisdom teeth removed in 1/2011  HX TUBAL LIGATION    
 c section Family History: 
Family History Problem Relation Age of Onset  Hypertension Father  Psychiatric Disorder Father   
     schzophrenia  Asthma Son  Asthma Son  Asthma Daughter Social History: 
Social History Tobacco Use  Smoking status: Current Every Day Smoker Packs/day: 1.00 Years: 12.00 Pack years: 12.00  Smokeless tobacco: Never Used Substance Use Topics  Alcohol use: No  
 Drug use: No  
 
 
Allergies: Allergies Allergen Reactions  Codeine Nausea and Vomiting Pt denies this allergy  Guaifenesin Nausea and Vomiting  Tessalon [Benzonatate] Other (comments) Sneezing  Tramadol Rash Review of Systems Review of Systems Constitutional: Negative for fever. HENT: Positive for facial swelling. Eyes: Negative for pain. Skin: Positive for color change and rash. All other systems reviewed and are negative. Physical Exam  
 
Vitals:  
 12/03/18 1624 BP: 120/79 Pulse: 79 Resp: 17 Temp: 97.7 °F (36.5 °C) SpO2: 99% Weight: 113.4 kg (250 lb) Height: 5' 6\" (1.676 m) Physical Exam  
Constitutional: She is oriented to person, place, and time. She appears well-developed and well-nourished. No distress. HENT:  
Head: Normocephalic and atraumatic. Eyes: Conjunctivae are normal.  
Cardiovascular: Normal rate, regular rhythm and normal heart sounds. Pulmonary/Chest: Effort normal and breath sounds normal. No respiratory distress. She has no wheezes. She has no rales. Neurological: She is alert and oriented to person, place, and time. Skin: Skin is warm and dry. Psychiatric: She has a normal mood and affect. Her behavior is normal. Judgment and thought content normal.  
Nursing note and vitals reviewed. at 5:36 PM 
 
Diagnostic Study Results Labs -  No results found for this or any previous visit (from the past 12 hour(s)). Radiologic Studies - No orders to display CT Results  (Last 48 hours) None CXR Results  (Last 48 hours) None Medical Decision Making I am the first provider for this patient. I reviewed the vital signs, available nursing notes, past medical history, past surgical history, family history and social history. Vital Signs-Reviewed the patient's vital signs. Records Reviewed: Old Medical Records ED Course as of Dec 03 1733 Mon Dec 03, 2018  
1733 Pain management plan reviewed, symptoms did not warrant abx and pt did not request any upon discharge  [] ED Course User Index [AH] Natty English PA-C Disposition: 
Discharged DISCHARGE NOTE:  
5:37 PM 
 
  Care plan outlined and precautions discussed. Patient has no new complaints, changes, or physical findings. All medications were reviewed with the patient; will d/c home. All of pt's questions and concerns were addressed. Patient was instructed and agrees to follow up with PCP, as well as to return to the ED upon further deterioration. Patient is ready to go home. Follow-up Information Follow up With Specialties Details Why Contact Info [address] [facility] Internal Medicine Schedule an appointment as soon as possible for a visit in 2 days As needed [place] [address] 
[phone] [facility] EMERGENCY DEPT Emergency Medicine  If symptoms worsen [address] Current Discharge Medication List  
  
START taking these medications Details  
trimethoprim-sulfamethoxazole (BACTRIM DS) 160-800 mg per tablet Take 2 Tabs by mouth two (2) times a day for 7 days. Qty: 28 Tab, Refills: 0  
  
ibuprofen (MOTRIN) 800 mg tablet Take 1 Tab by mouth every six (6) hours as needed for Pain for up to 7 days. Qty: 20 Tab, Refills: 0 CONTINUE these medications which have NOT CHANGED Details !! albuterol (PROVENTIL HFA, VENTOLIN HFA, PROAIR HFA) 90 mcg/actuation inhaler Take 2 Puffs by inhalation every four (4) hours as needed for Wheezing. Qty: 1 Inhaler, Refills: 1  
  
!! albuterol (PROVENTIL HFA, VENTOLIN HFA, PROAIR HFA) 90 mcg/actuation inhaler Take 2 Puffs by inhalation every four (4) hours as needed for Wheezing. Qty: 1 Inhaler, Refills: 0  
  
cetirizine (ZYRTEC) 10 mg tablet Take 1 Tab by mouth daily. Qty: 20 Tab, Refills: 0  
  
fluticasone (FLONASE) 50 mcg/actuation nasal spray 2 Sprays by Both Nostrils route daily. Qty: 1 Bottle, Refills: 0  
  
dextromethorphan-guaiFENesin (ROBITUSSIN-DM)  mg/5 mL syrup Take 10 mL by mouth every six (6) hours as needed for Cough. Qty: 120 mL, Refills: 0  
  
hydroCHLOROthiazide (MICROZIDE) 12.5 mg capsule Take 1 Cap by mouth daily. Qty: 30 Cap, Refills: 0  
  
 !! - Potential duplicate medications found. Please discuss with provider. Provider Notes (Medical Decision Making): DDX: cellulitis, abscess, insect bite Procedures Diagnosis Clinical Impression: 1. Facial cellulitis

## 2018-12-03 NOTE — ED NOTES
Emergency Department Nursing Plan of Care The Nursing Plan of Care is developed from the Nursing assessment and Emergency Department Attending provider initial evaluation. The plan of care may be reviewed in the ED Provider note. The Plan of Care was developed with the following considerations:  
Patient / Family readiness to learn indicated by:verbalized understanding Persons(s) to be included in education: patient Barriers to Learning/Limitations:No 
 
Signed Vilma Fragoso RN   
12/3/2018   5:16 PM

## 2018-12-03 NOTE — ED NOTES
Patient (s)  given copy of dc instructions and 0 paper script(s) and 2 electronic scripts. Patient (s)  verbalized understanding of instructions and script (s). Patient given a current medication reconciliation form and verbalized understanding of their medications. Patient (s) verbalized understanding of the importance of discussing medications with  his or her physician or clinic they will be following up with. Patient alert and oriented and in no acute distress. Patient offered wheelchair from treatment area to hospital entrance, patient declined wheelchair. Patient was discharged by the provider

## 2018-12-03 NOTE — ED TRIAGE NOTES
Patient complains of right side face redness and swelling x 2 days under right eye. Appears like cellulitis infection.

## 2018-12-11 ENCOUNTER — HOSPITAL ENCOUNTER (EMERGENCY)
Age: 38
Discharge: HOME | End: 2018-12-11
Attending: EMERGENCY MEDICINE
Payer: SELF-PAY

## 2018-12-11 VITALS
RESPIRATION RATE: 19 BRPM | WEIGHT: 250.44 LBS | DIASTOLIC BLOOD PRESSURE: 74 MMHG | SYSTOLIC BLOOD PRESSURE: 126 MMHG | HEIGHT: 67 IN | HEART RATE: 84 BPM | BODY MASS INDEX: 39.31 KG/M2 | OXYGEN SATURATION: 99 % | TEMPERATURE: 98.2 F

## 2018-12-11 DIAGNOSIS — J06.9 VIRAL UPPER RESPIRATORY TRACT INFECTION: Primary | ICD-10-CM

## 2018-12-11 DIAGNOSIS — Z76.5 DRUG-SEEKING BEHAVIOR: ICD-10-CM

## 2018-12-11 PROCEDURE — 99282 EMERGENCY DEPT VISIT SF MDM: CPT

## 2018-12-11 RX ORDER — AZITHROMYCIN 250 MG/1
TABLET, FILM COATED ORAL
Qty: 6 TAB | Refills: 0 | Status: SHIPPED | OUTPATIENT
Start: 2018-12-11 | End: 2018-12-16

## 2018-12-11 RX ORDER — GUAIFENESIN 100 MG/5ML
200 SOLUTION ORAL
Qty: 120 ML | Refills: 0 | Status: SHIPPED | OUTPATIENT
Start: 2018-12-11 | End: 2018-12-11

## 2018-12-11 NOTE — DISCHARGE INSTRUCTIONS

## 2018-12-11 NOTE — ED PROVIDER NOTES
EMERGENCY DEPARTMENT HISTORY AND PHYSICAL EXAM    Date: 12/11/2018  Patient Name: Shelly Saeed    History of Presenting Illness     Chief Complaint   Patient presents with    Cough         History Provided By: Patient      HPI: Shelly Saeed is a 45 y.o. female with a PMH of asthma and bipolar who presents with cc of cough with productive sputum and intermittent wheezing for the past 3 days. Pt states she gets these episodes often and only a zpack and hydrocodone cough syrup work for her. She specifically denies any fevers, chills, nausea, vomiting, chest pain, shortness of breath, headache, rash, diarrhea, sweating or weight loss. All other ROS negative at this time  Pt is in no acute distress and is speaking in full sentences      PCP: None    Current Outpatient Medications   Medication Sig Dispense Refill    azithromycin (ZITHROMAX Z-USAMA) 250 mg tablet Take 2 tablets today and one for the next 4 days 6 Tab 0    azithromycin (ZITHROMAX Z-USAMA) 250 mg tablet Take 2 pills on day 1 and then 1 pill for the next 4 days 6 Tab 0    chlorpheniramine-dm (CORICIDIN HBP COUGH AND COLD) 4-30 mg tab Take 10 mL by mouth four (4) times daily for 10 days. 40 Tab 0    albuterol (PROVENTIL HFA, VENTOLIN HFA, PROAIR HFA) 90 mcg/actuation inhaler Take 2 Puffs by inhalation every four (4) hours as needed for Wheezing. 1 Inhaler 1    albuterol (PROVENTIL HFA, VENTOLIN HFA, PROAIR HFA) 90 mcg/actuation inhaler Take 2 Puffs by inhalation every four (4) hours as needed for Wheezing. 1 Inhaler 0    cetirizine (ZYRTEC) 10 mg tablet Take 1 Tab by mouth daily. 20 Tab 0    fluticasone (FLONASE) 50 mcg/actuation nasal spray 2 Sprays by Both Nostrils route daily. 1 Bottle 0    dextromethorphan-guaiFENesin (ROBITUSSIN-DM)  mg/5 mL syrup Take 10 mL by mouth every six (6) hours as needed for Cough. 120 mL 0    hydroCHLOROthiazide (MICROZIDE) 12.5 mg capsule Take 1 Cap by mouth daily.  30 Cap 0       Past History     Past Medical History:  Past Medical History:   Diagnosis Date    Arthritis     Asthma     bronchitis    Bronchitis     HTN, goal below 140/90 2012    Psychiatric disorder     bipolar       Past Surgical History:  Past Surgical History:   Procedure Laterality Date    DELIVERY       HX HEENT      4 wisdom teeth removed in 2011    HX TUBAL LIGATION      c section       Family History:  Family History   Problem Relation Age of Onset    Hypertension Father     Psychiatric Disorder Father         schzophrenia    Asthma Son     Asthma Son     Asthma Daughter        Social History:  Social History     Tobacco Use    Smoking status: Current Every Day Smoker     Packs/day: 1.00     Years: 12.00     Pack years: 12.00    Smokeless tobacco: Never Used   Substance Use Topics    Alcohol use: No    Drug use: No       Allergies: Allergies   Allergen Reactions    Codeine Nausea and Vomiting     Pt denies this allergy    Guaifenesin Nausea and Vomiting    Tessalon [Benzonatate] Other (comments)     Sneezing    Tramadol Rash         Review of Systems   Review of Systems   Constitutional: Negative. Negative for chills and fever. HENT: Negative. Eyes: Negative. Respiratory: Positive for cough and wheezing. Negative for shortness of breath. Cardiovascular: Negative. Negative for chest pain. Gastrointestinal: Negative. Negative for abdominal pain, diarrhea, nausea and vomiting. Endocrine: Negative. Genitourinary: Negative. Musculoskeletal: Negative. Skin: Negative. Allergic/Immunologic: Negative. Neurological: Negative. Negative for headaches. Hematological: Negative. Psychiatric/Behavioral: Negative. All other systems reviewed and are negative.       Physical Exam     Vitals:    18 1710   BP: 126/74   Pulse: 84   Resp: 19   Temp: 98.2 °F (36.8 °C)   SpO2: 99%   Weight: 113.6 kg (250 lb 7.1 oz)   Height: 5' 7.2\" (1.707 m)     Physical Exam   Constitutional: She is oriented to person, place, and time. She appears well-developed and well-nourished. No distress. HENT:   Head: Normocephalic and atraumatic. Right Ear: Hearing, tympanic membrane, external ear and ear canal normal.   Left Ear: Hearing, tympanic membrane, external ear and ear canal normal.   Nose: Nose normal. No mucosal edema, rhinorrhea or nose lacerations. Mouth/Throat: Uvula is midline, oropharynx is clear and moist and mucous membranes are normal. No oropharyngeal exudate, posterior oropharyngeal edema, posterior oropharyngeal erythema or tonsillar abscesses. Eyes: Conjunctivae and EOM are normal. Pupils are equal, round, and reactive to light. Neck: Normal range of motion. Neck supple. No tracheal deviation present. Cardiovascular: Normal rate, regular rhythm and normal heart sounds. Pulmonary/Chest: Effort normal and breath sounds normal. No respiratory distress. She has no wheezes (CTA). She has no rales. Abdominal: Soft. Bowel sounds are normal. She exhibits no distension. There is no tenderness. There is no rebound, no CVA tenderness, no tenderness at McBurney's point and negative Trujillo's sign. Musculoskeletal: Normal range of motion. She exhibits no edema, tenderness or deformity. Lymphadenopathy:     She has no cervical adenopathy. Neurological: She is alert and oriented to person, place, and time. She has normal reflexes. She displays normal reflexes. No cranial nerve deficit. She exhibits normal muscle tone. Coordination normal.   Skin: Skin is warm and dry. She is not diaphoretic. No pallor. Psychiatric: She has a normal mood and affect. Her behavior is normal. Judgment and thought content normal.   Nursing note and vitals reviewed. Diagnostic Study Results     Labs -   No results found for this or any previous visit (from the past 12 hour(s)).     Radiologic Studies -   No orders to display     CT Results  (Last 48 hours)    None        CXR Results  (Last 48 hours) None            Medical Decision Making   I am the first provider for this patient. I reviewed the vital signs, available nursing notes, past medical history, past surgical history, family history and social history. Vital Signs-Reviewed the patient's vital signs. Records Reviewed: Nursing Notes and Old Medical Records            Disposition:  Discharge     DISCHARGE NOTE:   Care plan outlined and precautions discussed. Patient has no new complaints, changes, or physical findings. Results of visit were reviewed with the patient. All medications were reviewed with the patient; will d/c home. All of pt's questions and concerns were addressed. Patient was instructed and agrees to follow up with pcp, as well as to return to the ED upon further deterioration. Patient is ready to go home. Follow-up Information     Follow up With Specialties Details Why [id number]  Schedule an appointment as soon as possible for a visit in 3 days If symptoms worsen [address]          Discharge Medication List as of 12/11/2018  6:33 PM          Provider Notes (Medical Decision Making):   DDX: uri, bronchitis, drug seeking  Pt  Has been seen many times In the ED for requesting hydrocodone cough syrup   Pt was given a rx for corcedin and azithro     Procedures:  Procedures        Diagnosis     Clinical Impression:   1. Viral upper respiratory tract infection    2.  Drug-seeking behavior

## 2018-12-11 NOTE — ED TRIAGE NOTES
Patient presents with c/o dry, non-productive irritating cough persisting for three days. Denies fevers or chills. Speaks in complete sentences w/o difficulty. Patient reports daily 1/2ppd smoker.  NAD

## 2018-12-17 ENCOUNTER — HOSPITAL ENCOUNTER (EMERGENCY)
Age: 38
Discharge: HOME | End: 2018-12-17
Attending: EMERGENCY MEDICINE
Payer: SELF-PAY

## 2018-12-17 VITALS
WEIGHT: 250 LBS | HEART RATE: 81 BPM | BODY MASS INDEX: 39.24 KG/M2 | OXYGEN SATURATION: 100 % | RESPIRATION RATE: 16 BRPM | DIASTOLIC BLOOD PRESSURE: 83 MMHG | TEMPERATURE: 98.2 F | SYSTOLIC BLOOD PRESSURE: 141 MMHG | HEIGHT: 67 IN

## 2018-12-17 DIAGNOSIS — J20.9 ACUTE BRONCHITIS, UNSPECIFIED ORGANISM: Primary | ICD-10-CM

## 2018-12-17 PROCEDURE — 74011000250 HC RX REV CODE- 250: Performed by: EMERGENCY MEDICINE

## 2018-12-17 PROCEDURE — 77030029684 HC NEB SM VOL KT MONA -A

## 2018-12-17 PROCEDURE — 99282 EMERGENCY DEPT VISIT SF MDM: CPT

## 2018-12-17 PROCEDURE — 94640 AIRWAY INHALATION TREATMENT: CPT

## 2018-12-17 RX ORDER — ALBUTEROL SULFATE 90 UG/1
2 AEROSOL, METERED RESPIRATORY (INHALATION)
Qty: 1 INHALER | Refills: 0 | Status: SHIPPED | OUTPATIENT
Start: 2018-12-17 | End: 2019-11-27

## 2018-12-17 RX ORDER — IPRATROPIUM BROMIDE AND ALBUTEROL SULFATE 2.5; .5 MG/3ML; MG/3ML
3 SOLUTION RESPIRATORY (INHALATION)
Status: COMPLETED | OUTPATIENT
Start: 2018-12-17 | End: 2018-12-17

## 2018-12-17 RX ADMIN — IPRATROPIUM BROMIDE AND ALBUTEROL SULFATE 3 ML: .5; 3 SOLUTION RESPIRATORY (INHALATION) at 10:28

## 2018-12-17 NOTE — ED TRIAGE NOTES
PAtient arrives for a cough for a week. Denies productive cough. Reports hx of COPD and unable to see PCP at the moment. Able to speak in complete sentences.

## 2018-12-17 NOTE — ED PROVIDER NOTES
The history is provided by the patient. Cough   This is a new problem. The current episode started more than 1 week ago. The problem occurs constantly. The problem has not changed since onset. The cough is non-productive. There has been no fever. Pertinent negatives include no chest pain, no chills, no shortness of breath and no vomiting. She has tried nebulizers, inhalers, decongestants and steroids for the symptoms. The treatment provided no relief. She is a smoker. Her past medical history is significant for bronchitis and COPD.         Past Medical History:   Diagnosis Date    Arthritis     Asthma     bronchitis    Bronchitis     HTN, goal below 140/90 2012    Psychiatric disorder     bipolar       Past Surgical History:   Procedure Laterality Date    DELIVERY       HX HEENT      4 wisdom teeth removed in 2011    HX TUBAL LIGATION      c section         Family History:   Problem Relation Age of Onset    Hypertension Father     Psychiatric Disorder Father         schzophrenia    Asthma Son     Asthma Son     Asthma Daughter        Social History     Socioeconomic History    Marital status: SINGLE     Spouse name: Not on file    Number of children: Not on file    Years of education: Not on file    Highest education level: Not on file   Social Needs    Financial resource strain: Not on file    Food insecurity - worry: Not on file    Food insecurity - inability: Not on file   [employer] needs - medical: Not on file   [employer] needs - non-medical: Not on file   Occupational History    Not on file   Tobacco Use    Smoking status: Current Every Day Smoker     Packs/day: 1.00     Years: 12.00     Pack years: 12.00    Smokeless tobacco: Never Used   Substance and Sexual Activity    Alcohol use: No    Drug use: No    Sexual activity: Yes     Partners: Male     Birth control/protection: Condom   Other Topics Concern    Not on file   Social History Narrative    Not on file         ALLERGIES: Codeine; Guaifenesin; Tessalon [benzonatate]; and Tramadol    Review of Systems   Constitutional: Negative for chills and fever. Respiratory: Positive for cough. Negative for shortness of breath. Cardiovascular: Negative for chest pain. Gastrointestinal: Negative for abdominal pain, constipation, diarrhea and vomiting. Neurological: Negative for dizziness and light-headedness. All other systems reviewed and are negative. Vitals:    12/17/18 0937   Pulse: 71   SpO2: 97%            Physical Exam   Constitutional: She is oriented to person, place, and time. She appears well-developed and well-nourished. HENT:   Head: Normocephalic and atraumatic. Eyes: Pupils are equal, round, and reactive to light. Neck: Normal range of motion. Neck supple. Cardiovascular: Normal rate and regular rhythm. Pulmonary/Chest: Effort normal. She has wheezes. Abdominal: Soft. She exhibits no distension. There is no tenderness. Neurological: She is alert and oriented to person, place, and time. Skin: Skin is warm and dry. Capillary refill takes less than 2 seconds. Psychiatric: She has a normal mood and affect. Her behavior is normal.   Nursing note and vitals reviewed. MDM  Number of Diagnoses or Management Options  Acute bronchitis, unspecified organism:   Diagnosis management comments: Pt w hx of bronchitis presents with cough - triggered by the weather. No distress. Unable to see her PCP for tussionex and azithromycin so presents to the ED. No fevers, productive cough, or SOB to indicate PNA, no respiratory distress. Was seen 12/11 for similar symptoms and prescribed a Zpack which she should have completed yesterday. Procedures      The patient's results have been reviewed with them and/or available family.  Patient and/or family verbally conveyed their understanding and agreement of the patient's signs, symptoms, diagnosis, treatment and prognosis and additionally agree to follow up as recommended in the discharge instructions or to return to the Emergency Room should their condition change prior to their follow-up appointment. The patient/family verbally agrees with the care-plan and verbally conveys that all of their questions have been answered. The discharge instructions have also been provided to the patient and/or family with some educational information regarding the patient's diagnosis as well a list of reasons why the patient would want to return to the ER prior to their follow-up appointment, should their condition change. Pt reports that she is allergic to guaifenesin and cannot take robitussin as a result; however, she has been taking mucinex DM at home to manage her symptoms. I prescribed Maxi Tuss (Tussionex without hydrocodone) and patient reported that she wouldn't take the medication because her \"body reacts differently to different things\" and she would be going to a different doctor to get her tussionex.

## 2018-12-17 NOTE — DISCHARGE INSTRUCTIONS
Bronchitis: Care Instructions  Your Care Instructions    Bronchitis is inflammation of the bronchial tubes, which carry air to the lungs. The tubes swell and produce mucus, or phlegm. The mucus and inflamed bronchial tubes make you cough. You may have trouble breathing. Most cases of bronchitis are caused by viruses like those that cause colds. Antibiotics usually do not help and they may be harmful. Bronchitis usually develops rapidly and lasts about 2 to 3 weeks in otherwise healthy people. Follow-up care is a key part of your treatment and safety. Be sure to make and go to all appointments, and call your doctor if you are having problems. It's also a good idea to know your test results and keep a list of the medicines you take. How can you care for yourself at home? · Take all medicines exactly as prescribed. Call your doctor if you think you are having a problem with your medicine. · Get some extra rest.  · Take an over-the-counter pain medicine, such as acetaminophen (Tylenol), ibuprofen (Advil, Motrin), or naproxen (Aleve) to reduce fever and relieve body aches. Read and follow all instructions on the label. · Do not take two or more pain medicines at the same time unless the doctor told you to. Many pain medicines have acetaminophen, which is Tylenol. Too much acetaminophen (Tylenol) can be harmful. · Take an over-the-counter cough medicine that contains dextromethorphan to help quiet a dry, hacking cough so that you can sleep. Avoid cough medicines that have more than one active ingredient. Read and follow all instructions on the label. · Breathe moist air from a humidifier, hot shower, or sink filled with hot water. The heat and moisture will thin mucus so you can cough it out. · Do not smoke. Smoking can make bronchitis worse. If you need help quitting, talk to your doctor about stop-smoking programs and medicines. These can increase your chances of quitting for good.   When should you call for help? Call 911 anytime you think you may need emergency care. For example, call if:    · You have severe trouble breathing.    Call your doctor now or seek immediate medical care if:    · You have new or worse trouble breathing.     · You cough up dark brown or bloody mucus (sputum).     · You have a new or higher fever.     · You have a new rash.    Watch closely for changes in your health, and be sure to contact your doctor if:    · You cough more deeply or more often, especially if you notice more mucus or a change in the color of your mucus.     · You are not getting better as expected. Where can you learn more? Go to http://joao-ladi.info/. Enter H333 in the search box to learn more about \"Bronchitis: Care Instructions. \"  Current as of: December 6, 2017  Content Version: 11.8  © 6578-2273 [employer]. Care instructions adapted under license by [employer] (which disclaims liability or warranty for this information). If you have questions about a medical condition or this instruction, always ask your healthcare professional. [address] any warranty or liability for your use of this information.

## 2019-01-27 ENCOUNTER — HOSPITAL ENCOUNTER (EMERGENCY)
Age: 39
Discharge: HOME | End: 2019-01-27
Attending: EMERGENCY MEDICINE
Payer: MEDICAID

## 2019-01-27 VITALS
BODY MASS INDEX: 41.06 KG/M2 | WEIGHT: 255.51 LBS | OXYGEN SATURATION: 100 % | HEIGHT: 66 IN | DIASTOLIC BLOOD PRESSURE: 77 MMHG | SYSTOLIC BLOOD PRESSURE: 135 MMHG | HEART RATE: 73 BPM | TEMPERATURE: 97.8 F | RESPIRATION RATE: 12 BRPM

## 2019-01-27 DIAGNOSIS — Z76.5 DRUG-SEEKING BEHAVIOR: ICD-10-CM

## 2019-01-27 DIAGNOSIS — I10 ESSENTIAL HYPERTENSION: ICD-10-CM

## 2019-01-27 DIAGNOSIS — Z71.6 TOBACCO ABUSE COUNSELING: ICD-10-CM

## 2019-01-27 DIAGNOSIS — J41.8 MIXED SIMPLE AND MUCOPURULENT CHRONIC BRONCHITIS (HCC): Primary | ICD-10-CM

## 2019-01-27 DIAGNOSIS — F17.218 CIGARETTE NICOTINE DEPENDENCE WITH OTHER NICOTINE-INDUCED DISORDER: ICD-10-CM

## 2019-01-27 PROCEDURE — 99282 EMERGENCY DEPT VISIT SF MDM: CPT

## 2019-01-27 RX ORDER — AZITHROMYCIN 250 MG/1
TABLET, FILM COATED ORAL
Qty: 6 TAB | Refills: 0 | Status: SHIPPED | OUTPATIENT
Start: 2019-01-27 | End: 2019-02-01

## 2019-01-27 RX ORDER — PREDNISONE 20 MG/1
60 TABLET ORAL
Qty: 15 TAB | Refills: 0 | Status: SHIPPED | OUTPATIENT
Start: 2019-01-27 | End: 2019-02-01

## 2019-01-27 NOTE — ED NOTES
CAROL Duckworth reviewed discharge instructions with the patient. The patient verbalized understanding. Patient ambulatory out of ED with steady gait. No further complaints noted.

## 2019-01-27 NOTE — DISCHARGE INSTRUCTIONS
Patient Education        Learning About Benefits From Quitting Smoking  How does quitting smoking make you healthier? If you're thinking about quitting smoking, you may have a few reasons to be smoke-free. Your health may be one of them. · When you quit smoking, you lower your risks for cancer, lung disease, heart attack, stroke, blood vessel disease, and blindness from macular degeneration. · When you're smoke-free, you get sick less often, and you heal faster. You are less likely to get colds, flu, bronchitis, and pneumonia. · As a nonsmoker, you may find that your mood is better and you are less stressed. When and how will you feel healthier? Quitting has real health benefits that start from day 1 of being smoke-free. And the longer you stay smoke-free, the healthier you get and the better you feel. The first hours  · After just 20 minutes, your blood pressure and heart rate go down. That means there's less stress on your heart and blood vessels. · Within 12 hours, the level of carbon monoxide in your blood drops back to normal. That makes room for more oxygen. With more oxygen in your body, you may notice that you have more energy than when you smoked. After 2 weeks  · Your lungs start to work better. · Your risk of heart attack starts to drop. After 1 month  · When your lungs are clear, you cough less and breathe deeper, so it's easier to be active. · Your sense of taste and smell return. That means you can enjoy food more than you have since you started smoking. Over the years  · After 1 year, your risk of heart disease is half what it would be if you kept smoking. · After 5 years, your risk of stroke starts to shrink. Within a few years after that, it's about the same as if you'd never smoked. · After 10 years, your risk of dying from lung cancer is cut by about half. And your risk for many other types of cancer is lower too. How would quitting help others in your life?   When you quit smoking, you improve the health of everyone who now breathes in your smoke. · Their heart, lung, and cancer risks drop, much like yours. · They are sick less. For babies and small children, living smoke-free means they're less likely to have ear infections, pneumonia, and bronchitis. · If you're a woman who is or will be pregnant someday, quitting smoking means a healthier . · Children who are close to you are less likely to become adult smokers. Where can you learn more? Go to http://joao-ladi.info/. Enter 052 [id number] 72 11 in the search box to learn more about \"Learning About Benefits From Quitting Smoking. \"  Current as of: 2018  Content Version: 11.9  © 0190-6001 [employer]. Care instructions adapted under license by [facility] (which disclaims liability or warranty for this information). If you have questions about a medical condition or this instruction, always ask your healthcare professional. [place] [address] any warranty or liability for your use of this information. Patient Education        Stopping Smoking: Care Instructions  Your Care Instructions  Cigarette smokers crave the nicotine in cigarettes. Giving it up is much harder than simply changing a habit. Your body has to stop craving the nicotine. It is hard to quit, but you can do it. There are many tools that people use to quit smoking. You may find that combining tools works best for you. There are several steps to quitting. First you get ready to quit. Then you get support to help you. After that, you learn new skills and behaviors to become a nonsmoker. For many people, a necessary step is getting and using medicine. Your doctor will help you set up the plan that best meets your needs. You may want to attend a smoking cessation program to help you quit smoking. When you choose a program, look for one that has proven success. Ask your doctor for ideas.  You will greatly increase your chances of success if you take medicine as well as get counseling or join a cessation program.  Some of the changes you feel when you first quit tobacco are uncomfortable. Your body will miss the nicotine at first, and you may feel short-tempered and grumpy. You may have trouble sleeping or concentrating. Medicine can help you deal with these symptoms. You may struggle with changing your smoking habits and rituals. The last step is the tricky one: Be prepared for the smoking urge to continue for a time. This is a lot to deal with, but keep at it. You will feel better. Follow-up care is a key part of your treatment and safety. Be sure to make and go to all appointments, and call your doctor if you are having problems. It's also a good idea to know your test results and keep a list of the medicines you take. How can you care for yourself at home? · Ask your family, friends, and coworkers for support. You have a better chance of quitting if you have help and support. · Join a support group, such as Nicotine Anonymous, for people who are trying to quit smoking. · Consider signing up for a smoking cessation program, such as the American Lung Association's Freedom from Smoking program.  · Get text messaging support. Go to the website at www.smokefree. gov to sign up for the [facility] program.  · Set a quit date. Pick your date carefully so that it is not right in the middle of a big deadline or stressful time. Once you quit, do not even take a puff. Get rid of all ashtrays and lighters after your last cigarette. Clean your house and your clothes so that they do not smell of smoke. · Learn how to be a nonsmoker. Think about ways you can avoid those things that make you reach for a cigarette. ? Avoid situations that put you at greatest risk for smoking. For some people, it is hard to have a drink with friends without smoking.  For others, they might skip a coffee break with coworkers who smoke.  ? Change your daily routine. Take a different route to work or eat a meal in a different place. · Cut down on stress. Calm yourself or release tension by doing an activity you enjoy, such as reading a book, taking a hot bath, or gardening. · Talk to your doctor or pharmacist about nicotine replacement therapy, which replaces the nicotine in your body. You still get nicotine but you do not use tobacco. Nicotine replacement products help you slowly reduce the amount of nicotine you need. These products come in several forms, many of them available over-the-counter:  ? Nicotine patches  ? Nicotine gum and lozenges  ? Nicotine inhaler  · Ask your doctor about bupropion (Wellbutrin) or varenicline (Chantix), which are prescription medicines. They do not contain nicotine. They help you by reducing withdrawal symptoms, such as stress and anxiety. · Some people find hypnosis, acupuncture, and massage helpful for ending the smoking habit. · Eat a healthy diet and get regular exercise. Having healthy habits will help your body move past its craving for nicotine. · Be prepared to keep trying. Most people are not successful the first few times they try to quit. Do not get mad at yourself if you smoke again. Make a list of things you learned and think about when you want to try again, such as next week, next month, or next year. Where can you learn more? Go to http://joao-ladi.info/. Enter L200 in the search box to learn more about \"Stopping Smoking: Care Instructions. \"  Current as of: September 26, 2018  Content Version: 11.9  © 0064-4237 [employer]. Care instructions adapted under license by [employer] (which disclaims liability or warranty for this information).  If you have questions about a medical condition or this instruction, always ask your healthcare professional. [place] [address] any warranty or liability for your use of this information. Patient Education        Bronchitis: Care Instructions  Your Care Instructions    Bronchitis is inflammation of the bronchial tubes, which carry air to the lungs. The tubes swell and produce mucus, or phlegm. The mucus and inflamed bronchial tubes make you cough. You may have trouble breathing. Most cases of bronchitis are caused by viruses like those that cause colds. Antibiotics usually do not help and they may be harmful. Bronchitis usually develops rapidly and lasts about 2 to 3 weeks in otherwise healthy people. Follow-up care is a key part of your treatment and safety. Be sure to make and go to all appointments, and call your doctor if you are having problems. It's also a good idea to know your test results and keep a list of the medicines you take. How can you care for yourself at home? · Take all medicines exactly as prescribed. Call your doctor if you think you are having a problem with your medicine. · Get some extra rest.  · Take an over-the-counter pain medicine, such as acetaminophen (Tylenol), ibuprofen (Advil, Motrin), or naproxen (Aleve) to reduce fever and relieve body aches. Read and follow all instructions on the label. · Do not take two or more pain medicines at the same time unless the doctor told you to. Many pain medicines have acetaminophen, which is Tylenol. Too much acetaminophen (Tylenol) can be harmful. · Take an over-the-counter cough medicine that contains dextromethorphan to help quiet a dry, hacking cough so that you can sleep. Avoid cough medicines that have more than one active ingredient. Read and follow all instructions on the label. · Breathe moist air from a humidifier, hot shower, or sink filled with hot water. The heat and moisture will thin mucus so you can cough it out. · Do not smoke. Smoking can make bronchitis worse. If you need help quitting, talk to your doctor about stop-smoking programs and medicines.  These can increase your chances of quitting for good. When should you call for help? Call 911 anytime you think you may need emergency care. For example, call if:    · You have severe trouble breathing.    Call your doctor now or seek immediate medical care if:    · You have new or worse trouble breathing.     · You cough up dark brown or bloody mucus (sputum).     · You have a new or higher fever.     · You have a new rash.    Watch closely for changes in your health, and be sure to contact your doctor if:    · You cough more deeply or more often, especially if you notice more mucus or a change in the color of your mucus.     · You are not getting better as expected. Where can you learn more? Go to http://joao-ladi.info/. Enter H333 in the search box to learn more about \"Bronchitis: Care Instructions. \"  Current as of: September 5, 2018  Content Version: 11.9  © 0690-1219 [employer]. Care instructions adapted under license by [employer] (which disclaims liability or warranty for this information). If you have questions about a medical condition or this instruction, always ask your healthcare professional. [address] any warranty or liability for your use of this information.

## 2019-01-27 NOTE — ED PROVIDER NOTES
EMERGENCY DEPARTMENT HISTORY AND PHYSICAL EXAM 
 
 
Date: 1/27/2019 Patient Name: Nuvia Rivas History of Presenting Illness Chief Complaint Patient presents with  Cough  
  x 4 days with body aches History Provided By: Patient HPI: Nuvia Rivas, 44 y.o. female with PMHx significant for HTN, asthma, bipolar disorder, and tobacco abuse, presents ambulatory to the ED with cc of a dry cough associated with body aches and congestion. Pt states that these current symptoms are consistent with prior bouts of bronchitis. She has continued to smoke despite being told that this is exacerbating and causing her symptoms. She states she has used her nebulizer with little relief. She has not followed up with her PCP. She states she just received her health insurance back in January. Denies fever, chills, HA, mucous production, exposure to sick contacts. Did not receive flu vaccine this year. PCP: None There are no other complaints, changes, or physical findings at this time. Current Outpatient Medications Medication Sig Dispense Refill  predniSONE (DELTASONE) 20 mg tablet Take 60 mg by mouth daily (with breakfast) for 5 days. 15 Tab 0  
 azithromycin (ZITHROMAX Z-USAMA) 250 mg tablet Take 2 tablets (500mg) on day 1; then take 1 tablet (250 mg) on days 2, 3, 4 and 5 6 Tab 0  
 doxylamine-DM-acetaminophen (CORICIDIN HBP COLD-MULTI SYMPT) 6.25- mg/15 mL liqd Take 10 mL by mouth every six (6) hours. 118 mL 0  
 albuterol (PROVENTIL HFA, VENTOLIN HFA, PROAIR HFA) 90 mcg/actuation inhaler Take 2 Puffs by inhalation every four (4) hours as needed for Wheezing. 1 Inhaler 0  
 inhalational spacing device 1 Each by Does Not Apply route as needed. 1 Device 0  
 cetirizine (ZYRTEC) 10 mg tablet Take 1 Tab by mouth daily. 20 Tab 0  
 fluticasone (FLONASE) 50 mcg/actuation nasal spray 2 Sprays by Both Nostrils route daily.  1 Bottle 0  
  hydroCHLOROthiazide (MICROZIDE) 12.5 mg capsule Take 1 Cap by mouth daily. 30 Cap 0 Past History Past Medical History: 
Past Medical History:  
Diagnosis Date  Arthritis  Asthma   
 bronchitis  Bronchitis  HTN, goal below 140/90 2012  Psychiatric disorder   
 bipolar Past Surgical History: 
Past Surgical History:  
Procedure Laterality Date  DELIVERY     
 HX HEENT    
 4 wisdom teeth removed in 2011  HX TUBAL LIGATION    
 c section Family History: 
Family History Problem Relation Age of Onset  Hypertension Father  Psychiatric Disorder Father   
     schzophrenia  Asthma Son  Asthma Son  Asthma Daughter Social History: 
Social History Tobacco Use  Smoking status: Current Every Day Smoker Packs/day: 1.00 Years: 12.00 Pack years: 12.00  Smokeless tobacco: Never Used Substance Use Topics  Alcohol use: No  
 Drug use: No  
 
 
Allergies: Allergies Allergen Reactions  Codeine Nausea and Vomiting Pt denies this allergy  Guaifenesin Nausea and Vomiting  Tessalon [Benzonatate] Other (comments) Sneezing  Tramadol Rash Review of Systems Review of Systems Constitutional: Negative. Negative for activity change, appetite change, chills and fever. HENT: Positive for congestion. Negative for rhinorrhea and sore throat. Eyes: Negative for pain and visual disturbance. Respiratory: Positive for cough. Negative for shortness of breath and wheezing. Cardiovascular: Negative for chest pain and palpitations. Gastrointestinal: Negative for abdominal pain, nausea and vomiting. Genitourinary: Negative for dysuria and hematuria. Musculoskeletal: Negative for arthralgias and myalgias. Skin: Negative for color change, rash and wound. Neurological: Negative for dizziness and headaches. All other systems reviewed and are negative.  
 
 
Physical Exam  
Physical Exam  
 Constitutional: She is oriented to person, place, and time. Vital signs are normal. She appears well-developed. No distress. 44 y.o. female in NAD. Obese. Communicates appropriately and in full sentences Normal vital signs HENT:  
Head: Normocephalic and atraumatic. Eyes: Conjunctivae are normal. Pupils are equal, round, and reactive to light. Neck: Normal range of motion. Neck supple. No nuchal rigidity Cardiovascular: Normal rate, regular rhythm and intact distal pulses. Pulmonary/Chest: Effort normal and breath sounds normal. No respiratory distress. She has no wheezes. She exhibits no tenderness. Abdominal: Soft. There is no tenderness. Musculoskeletal: Normal range of motion. She exhibits no deformity. No neurologic, motor, vascular, or compartment embarrassment observed on exam. No focal neurologic deficits. Neurological: She is alert and oriented to person, place, and time. Skin: Skin is warm and dry. She is not diaphoretic. No erythema. Psychiatric: She has a normal mood and affect. Nursing note and vitals reviewed. Diagnostic Study Results No formal testing initiated. Pt refuses CXR stating, \"I know this isn't pneumonia. \" 
 
Medical Decision Making I am the first provider for this patient. I reviewed the vital signs, available nursing notes, past medical history, past surgical history, family history and social history. Vital Signs-Reviewed the patient's vital signs. Patient Vitals for the past 12 hrs: 
 Temp Pulse Resp BP SpO2  
01/27/19 0735 97.8 °F (36.6 °C) 73 12 135/77 100 % Records Reviewed: Nursing Notes and Old Medical Records Provider Notes (Medical Decision Making): DDx: bronchitis, reactive airway disease, non-compliance, tobacco abuse, viral illness, low suspicion pNA Reviewed management plan. ED Course:  
Initial assessment performed.  The patients presenting problems have been discussed, and they are in agreement with the care plan formulated and outlined with them. I have encouraged them to ask questions as they arise throughout their visit. TOBACCO COUNSELING: 
Spent 5 minutes discussing the risks of smoking and the benefits of smoking cessation as well as the long term sequelae of smoking with the pt who verbalized his understanding. Reviewed strategies for success, including gradually decreasing the number of cigarettes smoked a day. DISCHARGE NOTE: 
Carlos Steele's  results have been reviewed with her. She has been counseled regarding her diagnosis. She verbally conveys understanding and agreement of the signs, symptoms, diagnosis, treatment and prognosis and additionally agrees to follow up as recommended with Dr. None in 24 - 48 hours. She also agrees with the care-plan and conveys that all of her questions have been answered. I have also put together some discharge instructions for her that include: 1) educational information regarding their diagnosis, 2) how to care for their diagnosis at home, as well a 3) list of reasons why they would want to return to the ED prior to their follow-up appointment, should their condition change. She and/or family's questions have been answered. I have encouraged them to see the official results in [facility]\" or to retrieve the specifics of their results from medical records. PLAN: 
1. Return precautions as discussed 2. Follow-up with providers as directed 3. Medications as prescribed Return to ED if worse Diagnosis Clinical Impression: 1. Mixed simple and mucopurulent chronic bronchitis ([facility] [address] 2. Tobacco abuse counseling 3. Cigarette nicotine dependence with other nicotine-induced disorder 4. Drug-seeking behavior 5. Essential hypertension 6. BMI 40.0-44.9, adult ([facility] [address] Discharge Medication List as of 1/27/2019  8:00 AM  
  
START taking these medications Details predniSONE (DELTASONE) 20 mg tablet Take 60 mg by mouth daily (with breakfast) for 5 days. , Print, Disp-15 Tab, R-0  
  
azithromycin (ZITHROMAX Z-USAMA) 250 mg tablet Take 2 tablets (500mg) on day 1; then take 1 tablet (250 mg) on days 2, 3, 4 and 5, Print, Disp-6 Tab, R-0  
  
doxylamine-DM-acetaminophen (CORICIDIN HBP COLD-MULTI SYMPT) 6.25- mg/15 mL liqd Take 10 mL by mouth every six (6) hours. , Print, Disp-118 mL, R-0  
  
  
CONTINUE these medications which have NOT CHANGED Details  
albuterol (PROVENTIL HFA, VENTOLIN HFA, PROAIR HFA) 90 mcg/actuation inhaler Take 2 Puffs by inhalation every four (4) hours as needed for Wheezing., Print, Disp-1 Inhaler, R-0  
  
inhalational spacing device 1 Each by Does Not Apply route as needed. , Print, Disp-1 Device, R-0  
  
cetirizine (ZYRTEC) 10 mg tablet Take 1 Tab by mouth daily. , Print, Disp-20 Tab, R-0  
  
fluticasone (FLONASE) 50 mcg/actuation nasal spray 2 Sprays by Both Nostrils route daily. , Print, Disp-1 Bottle, R-0  
  
hydroCHLOROthiazide (MICROZIDE) 12.5 mg capsule Take 1 Cap by mouth daily. , Normal, Disp-30 Cap, R-0 Follow-up Information Follow up With Specialties Details Why Contact Info None  Call today  None [phone] Patient stated that they have no PCP Jany Meraz MD Pulmonary Disease Schedule an appointment as soon as possible for a visit in 3 days Possible further evaluation and treatment [address] [facility] [address] [phone] This note will not be viewable in [address]

## 2019-01-27 NOTE — ED NOTES
Patient presents to ED with complaints of a dry cough and congestion with body aches. She has been taking OTC medications and her personal nebulizer without relief. She states she has a hx of bronchitis and believes she is having a flare up. Patient resting comfortably in stretcher. Call bell within reach. PA at bedside to evaluate patient.

## 2019-04-29 ENCOUNTER — HOSPITAL ENCOUNTER (EMERGENCY)
Age: 39
Discharge: HOME | End: 2019-04-29
Attending: EMERGENCY MEDICINE
Payer: MEDICAID

## 2019-04-29 ENCOUNTER — APPOINTMENT (OUTPATIENT)
Dept: GENERAL RADIOLOGY | Age: 39
End: 2019-04-29
Attending: PHYSICIAN ASSISTANT
Payer: MEDICAID

## 2019-04-29 VITALS
OXYGEN SATURATION: 100 % | SYSTOLIC BLOOD PRESSURE: 124 MMHG | DIASTOLIC BLOOD PRESSURE: 73 MMHG | HEIGHT: 66 IN | BODY MASS INDEX: 43.37 KG/M2 | HEART RATE: 74 BPM | WEIGHT: 269.84 LBS | TEMPERATURE: 97.9 F | RESPIRATION RATE: 16 BRPM

## 2019-04-29 DIAGNOSIS — S93.402A SPRAIN OF LEFT ANKLE, UNSPECIFIED LIGAMENT, INITIAL ENCOUNTER: Primary | ICD-10-CM

## 2019-04-29 PROCEDURE — 99283 EMERGENCY DEPT VISIT LOW MDM: CPT

## 2019-04-29 PROCEDURE — 73610 X-RAY EXAM OF ANKLE: CPT

## 2019-04-29 RX ORDER — NAPROXEN 500 MG/1
500 TABLET ORAL 2 TIMES DAILY WITH MEALS
Qty: 20 TAB | Refills: 0 | Status: SHIPPED | OUTPATIENT
Start: 2019-04-29 | End: 2019-10-12

## 2019-04-29 NOTE — ED NOTES
Pt walked in room under own power with a noticeable limp, favoring her left side. Pt reports left foot swelling and pain x5 days, denies injury. PMS is intact. There is noticeable swelling in the left foot compared to the right foot. Pt is A&O x4, GCS of 15.

## 2019-04-29 NOTE — ED NOTES
SHU Downing reviewed discharge instructions with the patient. The patient verbalized understanding. All questions and concerns were addressed. The patient declined a wheelchair and is discharged ambulatory in the care of family members with instructions and prescriptions in hand. Pt is alert and oriented x 4. Respirations are clear and unlabored.

## 2019-04-29 NOTE — DISCHARGE INSTRUCTIONS
Patient Education        Ankle Sprain: Care Instructions  Your Care Instructions    An ankle sprain can happen when you twist your ankle. The ligaments that support the ankle can get stretched and torn. Often the ankle is swollen and painful. Ankle sprains may take from several weeks to several months to heal. Usually, the more pain and swelling you have, the more severe your ankle sprain is and the longer it will take to heal. You can heal faster and regain strength in your ankle with good home treatment. It is very important to give your ankle time to heal completely, so that you do not easily hurt your ankle again. Follow-up care is a key part of your treatment and safety. Be sure to make and go to all appointments, and call your doctor if you are having problems. It's also a good idea to know your test results and keep a list of the medicines you take. How can you care for yourself at home? · Prop up your foot on pillows as much as possible for the next 3 days. Try to keep your ankle above the level of your heart. This will help reduce the swelling. · Follow your doctor's directions for wearing a splint or elastic bandage. Wrapping the ankle may help reduce or prevent swelling. · Your doctor may give you a splint, a brace, an air stirrup, or another form of ankle support to protect your ankle until it is healed. Wear it as directed while your ankle is healing. Do not remove it unless your doctor tells you to. After your ankle has healed, ask your doctor whether you should wear the brace when you exercise. · Put ice or cold packs on your injured ankle for 10 to 20 minutes at a time. Try to do this every 1 to 2 hours for the next 3 days (when you are awake) or until the swelling goes down. Put a thin cloth between the ice and your skin. · You may need to use crutches until you can walk without pain. If you do use crutches, try to bear some weight on your injured ankle if you can do so without pain.  This helps the ankle heal.  · Take pain medicines exactly as directed. ? If the doctor gave you a prescription medicine for pain, take it as prescribed. ? If you are not taking a prescription pain medicine, ask your doctor if you can take an over-the-counter medicine. · If you have been given ankle exercises to do at home, do them exactly as instructed. These can promote healing and help prevent lasting weakness. When should you call for help? Call your doctor now or seek immediate medical care if:    · Your pain is getting worse.     · Your swelling is getting worse.     · Your splint feels too tight or you are unable to loosen it.    Watch closely for changes in your health, and be sure to contact your doctor if:    · You are not getting better after 1 week. Where can you learn more? Go to http://joao-ladi.info/. Enter B394 in the search box to learn more about \"Ankle Sprain: Care Instructions. \"  Current as of: September 20, 2018  Content Version: 11.9  © 4677-9371 [employer]. Care instructions adapted under license by [employer] (which disclaims liability or warranty for this information). If you have questions about a medical condition or this instruction, always ask your healthcare professional. [place] [address] any warranty or liability for your use of this information. Patient Education        Ankle Sprain: Rehab Exercises  Your Care Instructions  Here are some examples of typical rehabilitation exercises for your condition. Start each exercise slowly. Ease off the exercise if you start to have pain. Your doctor or physical therapist will tell you when you can start these exercises and which ones will work best for you. How to do the exercises  \"Alphabet\" exercise    1. Trace the alphabet with your toe. This helps your ankle move in all directions. Side-to-side knee swing exercise    1.  Sit in a chair with your foot flat on the floor.  2. Slowly move your knee from side to side. Keep your foot pressed flat. 3. Continue this exercise for 2 to 3 minutes. Towel curl    1. While sitting, place your foot on a towel on the floor. Scrunch the towel toward you with your toes. 2. Then use your toes to push the towel away from you. 3. To make this exercise more challenging you can put something on the other end of the towel. A can of soup is about the right weight for this. Towel stretch    1. Sit with your legs extended and knees straight. 2. Place a towel around your foot just under the toes. 3. Hold each end of the towel in each hand, with your hands above your knees. 4. Pull back with the towel so that your foot stretches toward you. 5. Hold the position for at least 15 to 30 seconds. 6. Repeat 2 to 4 times a session. Do up to 5 sessions a day. Ankle eversion exercise    1. Start by sitting with your foot flat on the floor. Push your foot outward against a wall or a piece of furniture that doesn't move. Hold for about 6 seconds, and relax. Repeat 8 to 12 times. 2. After you feel comfortable with this, try using rubber tubing looped around the outside of your feet for resistance. Push your foot out to the side against the tubing, and then count to 10 as you slowly bring your foot back to the middle. Repeat 8 to 12 times. Isometric opposition exercises    1. While sitting, put your feet together flat on the floor. 2. Press your injured foot inward against your other foot. Hold for about 6 seconds, and relax. Repeat 8 to 12 times. 3. Then place the heel of your other foot on top of the injured one. Push down with the top heel while trying to push up with your injured foot. Hold for about 6 seconds, and relax. Repeat 8 to 12 times. Resisted ankle inversion    1. Sit on the floor with your good leg crossed over your other leg. 2. Hold both ends of an exercise band and loop the band around the inside of your affected foot. Then press your other foot against the band. 3. Keeping your legs crossed, slowly push your affected foot against the band so that foot moves away from your other foot. Then slowly relax. 4. Repeat 8 to 12 times. Resisted ankle eversion    1. Sit on the floor with your legs straight. 2. Hold both ends of an exercise band and loop the band around the outside of your affected foot. Then press your other foot against the band. 3. Keeping your leg straight, slowly push your affected foot outward against the band and away from your other foot without letting your leg rotate. Then slowly relax. 4. Repeat 8 to 12 times. Resisted ankle dorsiflexion    1. Tie the ends of an exercise band together to form a loop. Attach one end of the loop to a secure object or shut a door on it to hold it in place. (Or you can have someone hold one end of the loop to provide resistance.)  2. While sitting on the floor or in a chair, loop the other end of the band over the top of your affected foot. 3. Keeping your knee and leg straight, slowly flex your foot to pull back on the exercise band, and then slowly relax. 4. Repeat 8 to 12 times. Single-leg balance    1. Stand on a flat surface with your arms stretched out to your sides like you are making the letter \"T. \" Then lift your good leg off the floor, bending it at the knee. If you are not steady on your feet, use one hand to hold on to a chair, counter, or wall. 2. Standing on the leg with your affected ankle, keep that knee straight. Try to balance on that leg for up to 30 seconds. Then rest for up to 10 seconds. 3. Repeat 6 to 8 times. 4. When you can balance on your affected leg for 30 seconds with your eyes open, try to balance on it with your eyes closed. 5. When you can do this exercise with your eyes closed for 30 seconds and with ease and no pain, try standing on a pillow or piece of foam, and repeat steps 1 through 4.     Follow-up care is a key part of your treatment and safety. Be sure to make and go to all appointments, and call your doctor if you are having problems. It's also a good idea to know your test results and keep a list of the medicines you take. Where can you learn more? Go to http://joao-ladi.info/. Caleb Bowden in the search box to learn more about \"Ankle Sprain: Rehab Exercises. \"  Current as of: September 20, 2018  Content Version: 11.9  © 4566-0069 [employer]. Care instructions adapted under license by [employer] (which disclaims liability or warranty for this information). If you have questions about a medical condition or this instruction, always ask your healthcare professional. [address] any warranty or liability for your use of this information.

## 2019-04-29 NOTE — ED PROVIDER NOTES
EMERGENCY DEPARTMENT HISTORY AND PHYSICAL EXAM 
 
 
Date: 4/29/2019 Patient Name: Lily Morris History of Presenting Illness Chief Complaint Patient presents with  Foot Pain  
  pt reports left foot swelling and pain x5 days, denies injury History Provided By: Patient HPI: Lily Morris, 44 y.o. female with PMHx  for obesity, hypertension, arthritis, bipolar,, presents to the ED with cc of left foot swelling and pain for the past 4 to 5 days. Patient states that she did twist her ankle the other day and her pain and swelling worsened which prompted her to arrived into the ER today. Patient has not been taking any over-the-counter medication for symptoms. She denies any previous injury or trauma, numbness or tingling, history of gout, fevers, chills, nausea, vomiting, chest pain, shortness of breath, headache, rash, diarrhea, sweating or weight loss. Patient has been able to ambulate with mild difficulty. All other ROS negative at this time Pt is in no acute distress and is speaking in full sentences There are no other complaints, changes, or physical findings at this time. Social History Tobacco Use  Smoking status: Current Every Day Smoker Packs/day: 1.00 Years: 12.00 Pack years: 12.00  Smokeless tobacco: Never Used Substance Use Topics  Alcohol use: No  
 Drug use: No  
 
 
Allergies Allergen Reactions  Codeine Nausea and Vomiting Pt denies this allergy  Guaifenesin Nausea and Vomiting  Tessalon [Benzonatate] Other (comments) Sneezing  Tramadol Rash PCP: None No current facility-administered medications on file prior to encounter. Current Outpatient Medications on File Prior to Encounter Medication Sig Dispense Refill  albuterol (PROVENTIL HFA, VENTOLIN HFA, PROAIR HFA) 90 mcg/actuation inhaler Take 2 Puffs by inhalation every four (4) hours as needed for Wheezing.  1 Inhaler 0  
  inhalational spacing device 1 Each by Does Not Apply route as needed. 1 Device 0 Past History Past Medical History: 
Past Medical History:  
Diagnosis Date  Arthritis  Asthma   
 bronchitis  Bronchitis  HTN, goal below 140/90 2012  Psychiatric disorder   
 bipolar Past Surgical History: 
Past Surgical History:  
Procedure Laterality Date  DELIVERY     
 HX HEENT    
 4 wisdom teeth removed in 2011  HX TUBAL LIGATION    
 c section Family History: 
Family History Problem Relation Age of Onset  Hypertension Father  Psychiatric Disorder Father   
     schzophrenia  Asthma Son  Asthma Son  Asthma Daughter Social History: 
Social History Tobacco Use  Smoking status: Current Every Day Smoker Packs/day: 1.00 Years: 12.00 Pack years: 12.00  Smokeless tobacco: Never Used Substance Use Topics  Alcohol use: No  
 Drug use: No  
 
 
Allergies: Allergies Allergen Reactions  Codeine Nausea and Vomiting Pt denies this allergy  Guaifenesin Nausea and Vomiting  Tessalon [Benzonatate] Other (comments) Sneezing  Tramadol Rash Review of Systems Review of Systems Constitutional: Negative. Negative for chills and fever. HENT: Negative. Eyes: Negative. Respiratory: Negative. Negative for shortness of breath. Cardiovascular: Negative. Negative for chest pain. Gastrointestinal: Negative. Negative for abdominal pain, diarrhea, nausea and vomiting. Endocrine: Negative. Genitourinary: Negative. Musculoskeletal: Positive for arthralgias and joint swelling. Skin: Negative. Allergic/Immunologic: Negative. Neurological: Negative. Negative for headaches. Hematological: Negative. Psychiatric/Behavioral: Negative. All other systems reviewed and are negative.  
 
 
Physical Exam  
Physical Exam  
 Constitutional: She is oriented to person, place, and time. She appears well-developed and well-nourished. No distress. HENT:  
Head: Normocephalic and atraumatic. Right Ear: External ear normal.  
Left Ear: External ear normal.  
Nose: Nose normal.  
Mouth/Throat: Oropharynx is clear and moist.  
Eyes: Pupils are equal, round, and reactive to light. Conjunctivae and EOM are normal.  
Neck: Normal range of motion. Neck supple. No tracheal deviation present. Cardiovascular: Normal rate, regular rhythm, normal heart sounds and intact distal pulses. Pulmonary/Chest: Effort normal and breath sounds normal. No respiratory distress. She has no wheezes. Abdominal: Soft. Bowel sounds are normal. She exhibits no distension. There is no tenderness. There is no rebound, no CVA tenderness, no tenderness at McBurney's point and negative Trujillo's sign. Musculoskeletal: Normal range of motion. She exhibits edema and tenderness. She exhibits no deformity. Right knee: Normal.  
     Left knee: Normal.  
     Right ankle: Normal.  
     Left ankle: She exhibits normal range of motion, no swelling, no ecchymosis, no deformity, no laceration and normal pulse. Tenderness. Lateral malleolus tenderness found. No medial malleolus, no AITFL, no CF ligament, no posterior TFL, no head of 5th metatarsal and no proximal fibula tenderness found. Achilles tendon normal.  
     Right lower leg: Normal.  
     Left lower leg: Normal.  
     Right foot: Normal. There is normal range of motion, no tenderness, no bony tenderness, no swelling, normal capillary refill, no crepitus, no deformity and no laceration. Left foot: Normal. There is normal range of motion, no tenderness, no bony tenderness, no swelling, normal capillary refill, no crepitus, no deformity and no laceration. Lymphadenopathy:  
  She has no cervical adenopathy. Neurological: She is alert and oriented to person, place, and time.  She has normal reflexes. She displays normal reflexes. No cranial nerve deficit. She exhibits normal muscle tone. Coordination normal.  
Skin: Skin is warm and dry. She is not diaphoretic. No pallor. Psychiatric: She has a normal mood and affect. Her behavior is normal. Judgment and thought content normal.  
Nursing note and vitals reviewed. Diagnostic Study Results Labs - No results found for this or any previous visit (from the past 12 hour(s)). Radiologic Studies -  
XR ANKLE LT MIN 3 V Final Result IMPRESSION: No acute fracture or dislocation. CT Results  (Last 48 hours) None CXR Results  (Last 48 hours) None Medical Decision Making I am the first provider for this patient. I reviewed the vital signs, available nursing notes, past medical history, past surgical history, family history and social history. Vital Signs-Reviewed the patient's vital signs. Patient Vitals for the past 12 hrs: 
 Temp Pulse Resp BP SpO2  
04/29/19 1019     100 % 04/29/19 1010 97.9 °F (36.6 °C) 74 16 124/73 100 % Records Reviewed: Nursing Notes, Old Medical Records, Previous Radiology Studies and Previous Laboratory Studies Provider Notes (Medical Decision Making): DDX: Fracture, contusion, sprain, dislocation, arthritis, Worsening si/sxs discussed extensively Follow up with PCP or RTC if symptoms/signs worsen Side effects of medication discussed Education materials provided at discharge Pt verbalizes agreement with plan ED Course:  
Initial assessment performed. The patients presenting problems have been discussed, and they are in agreement with the care plan formulated and outlined with them. I have encouraged them to ask questions as they arise throughout their visit. Disposition: 
Discharge Care plan outlined and precautions discussed.   Patient has no new complaints, changes, or physical findings. Results of visit were reviewed with the patient. All medications were reviewed with the patient; will d/c home. All of pt's questions and concerns were addressed. Patient was instructed and agrees to follow up with pcp, as well as to return to the ED upon further deterioration. Patient is ready to go home. Diagnosis Clinical Impression: 1. Sprain of left ankle, unspecified ligament, initial encounter

## 2019-04-29 NOTE — LETTER
[address] 
[facility] EMERGENCY DEPT 
[address] [place] [address] [address] [id number] [phone] Work/School Note Date: 4/29/2019 To Whom It May concern: 
 
Annika Chavez was seen and treated today in the emergency room by the following provider(s): 
Attending Provider: Sandy Chua MD 
Physician Assistant: CAROL Adkins. Annika Chavez may return to work on 5/1/19. Sincerely, Luiz Cummings PA

## 2019-08-28 ENCOUNTER — HOSPITAL ENCOUNTER (EMERGENCY)
Age: 39
Discharge: HOME | End: 2019-08-28
Attending: EMERGENCY MEDICINE
Payer: MEDICAID

## 2019-08-28 ENCOUNTER — APPOINTMENT (OUTPATIENT)
Dept: GENERAL RADIOLOGY | Age: 39
End: 2019-08-28
Attending: PHYSICIAN ASSISTANT
Payer: MEDICAID

## 2019-08-28 VITALS
WEIGHT: 266 LBS | TEMPERATURE: 98.5 F | RESPIRATION RATE: 16 BRPM | SYSTOLIC BLOOD PRESSURE: 132 MMHG | BODY MASS INDEX: 42.75 KG/M2 | OXYGEN SATURATION: 94 % | DIASTOLIC BLOOD PRESSURE: 77 MMHG | HEART RATE: 96 BPM | HEIGHT: 66 IN

## 2019-08-28 DIAGNOSIS — S50.01XA CONTUSION OF RIGHT ELBOW, INITIAL ENCOUNTER: ICD-10-CM
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
## 2019-08-28 DIAGNOSIS — M17.11 ARTHRITIS OF KNEE, RIGHT: ICD-10-CM

## 2019-08-28 PROCEDURE — 73080 X-RAY EXAM OF ELBOW: CPT

## 2019-08-28 PROCEDURE — 99283 EMERGENCY DEPT VISIT LOW MDM: CPT

## 2019-08-28 PROCEDURE — 73562 X-RAY EXAM OF KNEE 3: CPT

## 2019-08-28 RX ORDER — NAPROXEN 250 MG/1
500 TABLET ORAL
Status: DISCONTINUED | OUTPATIENT
Start: 2019-08-28 | End: 2019-08-28 | Stop reason: HOSPADM

## 2019-08-28 RX ORDER — KETOROLAC TROMETHAMINE 10 MG/1
10 TABLET, FILM COATED ORAL
Qty: 10 TAB | Refills: 0 | Status: SHIPPED | OUTPATIENT
Start: 2019-08-28 | End: 2019-11-27

## 2019-08-28 NOTE — ED NOTES
..Discharge summary and discharge medications reviewed with patient and appropriate educational materials and side effects teaching were provided. patient  Given 0 paper prescriptions and 1 electronic prescriptions sent to pt's listed pharmacy. Patient (s) verbalized understanding of the importance of discussing medications with his or her physician or clinic they will be following up with. No si/s of acute distress prior to discharge. Patient offered wheelchair from treatment area to hospital entrance, patient declined wheelchair.

## 2019-08-28 NOTE — ED TRIAGE NOTES
Pt reports slipping on the wet floor in a restroom x4 hours. Pt reports right knee and elbow pain. Pt denies hitting her head. Pt denies taking any medications.

## 2019-08-28 NOTE — ED PROVIDER NOTES
EMERGENCY DEPARTMENT HISTORY AND PHYSICAL EXAM      Date: 2019  Patient Name: Kristin Long    History of Presenting Illness     Chief Complaint   Patient presents with    Fall       History Provided By: Patient    HPI: Kristin Long, 44 y.o. female PMHx significant for arthritis, htn, bipolar ds,presents ambulatory to the ED with cc of fall about 4 hours PTA. Pt reports slipping on water and falling forwards. Pt reports constant aching pain to right knee and elbow, made worse with movement. Denies hitting head and LOC. Rates pain 10/10. Denies numbness/tingling, radiating pain. There are no other complaints, changes, or physical findings at this time. PCP: None    No current facility-administered medications on file prior to encounter. Current Outpatient Medications on File Prior to Encounter   Medication Sig Dispense Refill    naproxen (NAPROSYN) 500 mg tablet Take 1 Tab by mouth two (2) times daily (with meals). 20 Tab 0    albuterol (PROVENTIL HFA, VENTOLIN HFA, PROAIR HFA) 90 mcg/actuation inhaler Take 2 Puffs by inhalation every four (4) hours as needed for Wheezing. 1 Inhaler 0    inhalational spacing device 1 Each by Does Not Apply route as needed.  1 Device 0       Past History     Past Medical History:  Past Medical History:   Diagnosis Date    Arthritis     Asthma     bronchitis    Bronchitis     HTN, goal below 140/90 2012    Psychiatric disorder     bipolar       Past Surgical History:  Past Surgical History:   Procedure Laterality Date    DELIVERY       HX HEENT      4 wisdom teeth removed in 2011    HX TUBAL LIGATION      c section       Family History:  Family History   Problem Relation Age of Onset    Hypertension Father     Psychiatric Disorder Father         schzophrenia    Asthma Son     Asthma Son     Asthma Daughter        Social History:  Social History     Tobacco Use    Smoking status: Current Every Day Smoker     Packs/day: 1.00 Years: 12.00     Pack years: 12.00    Smokeless tobacco: Never Used   Substance Use Topics    Alcohol use: No    Drug use: No       Allergies: Allergies   Allergen Reactions    Codeine Nausea and Vomiting     Pt denies this allergy    Guaifenesin Nausea and Vomiting    Tessalon [Benzonatate] Other (comments)     Sneezing    Tramadol Rash         Review of Systems   Review of Systems   Constitutional: Negative for chills and fever. Respiratory: Negative for shortness of breath. Cardiovascular: Negative for chest pain. Gastrointestinal: Negative for abdominal pain, nausea and vomiting. Genitourinary: Negative for flank pain. Musculoskeletal: Positive for arthralgias (right elbow and knee pain). Negative for back pain and myalgias. Skin: Negative for color change, pallor, rash and wound. Neurological: Negative for dizziness, weakness and light-headedness. All other systems reviewed and are negative. Physical Exam   Physical Exam   Constitutional: She is oriented to person, place, and time. She appears well-developed and well-nourished. No distress. HENT:   Head: Normocephalic and atraumatic. Eyes: Conjunctivae are normal.   Cardiovascular: Normal rate, regular rhythm and normal heart sounds. Pulmonary/Chest: Effort normal and breath sounds normal. No respiratory distress. Abdominal: Soft. Bowel sounds are normal. She exhibits no distension. Musculoskeletal:        Right elbow: Tenderness found. Olecranon process tenderness noted. Right knee: She exhibits normal range of motion and no swelling. Tenderness found. Patellar tendon tenderness noted. DP pulses strong and equal b/l  Radial pulses strong and equal b/l   Neurological: She is alert and oriented to person, place, and time. Skin: Skin is warm. No rash noted. Psychiatric: She has a normal mood and affect. Her behavior is normal.   Nursing note and vitals reviewed.       Diagnostic Study Results     Labs -   No results found for this or any previous visit (from the past 12 hour(s)). Radiologic Studies -   XR ELBOW RT MIN 3 V   Final Result   IMPRESSION: No acute bony abnormality. XR KNEE RT 3 V   Final Result   IMPRESSION: Patellofemoral osteoarthritis. CT Results  (Last 48 hours)    None        CXR Results  (Last 48 hours)    None          Medical Decision Making   I am the first provider for this patient. I reviewed the vital signs, available nursing notes, past medical history, past surgical history, family history and social history. Vital Signs-Reviewed the patient's vital signs. Patient Vitals for the past 12 hrs:   Temp Pulse Resp BP SpO2   08/28/19 1237 98.5 °F (36.9 °C) 96 16 132/77 94 %         Records Reviewed: Nursing Notes and Old Medical Records    Provider Notes (Medical Decision Making):   DDx: Elbow contusion vs fracture, Knee contusion vs fracture, Fall    ED Course:   Initial assessment performed. The patients presenting problems have been discussed, and they are in agreement with the care plan formulated and outlined with them. I have encouraged them to ask questions as they arise throughout their visit. ED Course as of Aug 28 1407   Wed Aug 28, 2019   1323 Pt declined naproxen, stating it does not work for her.     [ET]      ED Course User Index  [ET] Carmen Pea, [place]       Disposition:  2:07 PM  Discussed imaging results with pt along with dx and treatment plan. Discussed importance of PCP follow up. All questions answered. Pt voiced they understood. Return if sx worsen. PLAN:  1. Discharge Medication List as of 8/28/2019  1:50 PM      START taking these medications    Details   ketorolac (TORADOL) 10 mg tablet Take 1 Tab by mouth every six (6) hours as needed for Pain., Normal, Disp-10 Tab, R-0         CONTINUE these medications which have NOT CHANGED    Details   naproxen (NAPROSYN) 500 mg tablet Take 1 Tab by mouth two (2) times daily (with meals). , Normal, Disp-20 Tab, R-0      albuterol (PROVENTIL HFA, VENTOLIN HFA, PROAIR HFA) 90 mcg/actuation inhaler Take 2 Puffs by inhalation every four (4) hours as needed for Wheezing., Print, Disp-1 Inhaler, R-0      inhalational spacing device 1 Each by Does Not Apply route as needed. , Print, Disp-1 Device, R-0           2. Follow-up Information     Follow up With Specialties Details Why [facility]  Schedule an appointment as soon as possible for a visit As needed [address] [facility] [place], [address]  [phone]        Return to ED if worse     Diagnosis     Clinical Impression:   1. Fall, initial encounter    2. Arthritis of knee, right    3. Contusion of right elbow, initial encounter        Attestations:    CAROL Araya    Please note that this dictation was completed with [employer], the computer voice recognition software. Quite often unanticipated grammatical, syntax, homophones, and other interpretive errors are inadvertently transcribed by the computer software. Please disregard these errors. Please excuse any errors that have escaped final proofreading. Thank you.

## 2019-08-28 NOTE — ED NOTES
Pt reports right knee pain after slipping on water. Pt ambulates independently and declined wheelchair. Emergency Department Nursing Plan of Care       The Nursing Plan of Care is developed from the Nursing assessment and Emergency Department Attending provider initial evaluation. The plan of care may be reviewed in the ED Provider note.     The Plan of Care was developed with the following considerations:   Patient / Family readiness to learn indicated by:verbalized understanding  Persons(s) to be included in education: patient  Barriers to Learning/Limitations:No    Signed     Leela Eaton RN    8/28/2019   1:08 PM

## 2019-10-12 ENCOUNTER — HOSPITAL ENCOUNTER (EMERGENCY)
Age: 39
Discharge: HOME | End: 2019-10-12
Attending: EMERGENCY MEDICINE | Admitting: EMERGENCY MEDICINE
Payer: MEDICAID

## 2019-10-12 VITALS
BODY MASS INDEX: 43.87 KG/M2 | RESPIRATION RATE: 18 BRPM | HEIGHT: 66 IN | DIASTOLIC BLOOD PRESSURE: 99 MMHG | HEART RATE: 79 BPM | WEIGHT: 273 LBS | SYSTOLIC BLOOD PRESSURE: 117 MMHG | OXYGEN SATURATION: 98 % | TEMPERATURE: 98.1 F

## 2019-10-12 DIAGNOSIS — N30.00 ACUTE CYSTITIS WITHOUT HEMATURIA: ICD-10-CM

## 2019-10-12 DIAGNOSIS — R10.9 ACUTE ABDOMINAL PAIN: Primary | ICD-10-CM

## 2019-10-12 LAB
APPEARANCE UR: ABNORMAL
BACTERIA URNS QL MICRO: NEGATIVE /HPF
BILIRUB UR QL CFM: NEGATIVE
COLOR UR: ABNORMAL
EPITH CASTS URNS QL MICRO: ABNORMAL /LPF
GLUCOSE UR STRIP.AUTO-MCNC: NEGATIVE MG/DL
HCG UR QL: NEGATIVE
HGB UR QL STRIP: ABNORMAL
KETONES UR QL STRIP.AUTO: ABNORMAL MG/DL
LEUKOCYTE ESTERASE UR QL STRIP.AUTO: ABNORMAL
NITRITE UR QL STRIP.AUTO: NEGATIVE
PH UR STRIP: 5 [PH] (ref 5–8)
PROT UR STRIP-MCNC: 30 MG/DL
RBC #/AREA URNS HPF: >100 /HPF (ref 0–5)
SP GR UR REFRACTOMETRY: 1.03 (ref 1–1.03)
UA: UC IF INDICATED,UAUC: ABNORMAL
UROBILINOGEN UR QL STRIP.AUTO: 1 EU/DL (ref 0.2–1)
WBC URNS QL MICRO: ABNORMAL /HPF (ref 0–4)

## 2019-10-12 PROCEDURE — 81001 URINALYSIS AUTO W/SCOPE: CPT

## 2019-10-12 PROCEDURE — 81025 URINE PREGNANCY TEST: CPT

## 2019-10-12 PROCEDURE — 99283 EMERGENCY DEPT VISIT LOW MDM: CPT

## 2019-10-12 RX ORDER — CEPHALEXIN 500 MG/1
500 CAPSULE ORAL 4 TIMES DAILY
Qty: 28 CAP | Refills: 0 | Status: SHIPPED | OUTPATIENT
Start: 2019-10-12 | End: 2019-10-19

## 2019-10-12 RX ORDER — NAPROXEN 500 MG/1
500 TABLET ORAL 2 TIMES DAILY WITH MEALS
Qty: 20 TAB | Refills: 0 | Status: SHIPPED | OUTPATIENT
Start: 2019-10-12 | End: 2019-11-27

## 2019-10-12 NOTE — DISCHARGE INSTRUCTIONS
Patient Education        Abdominal Pain: Care Instructions  Your Care Instructions    Abdominal pain has many possible causes. Some aren't serious and get better on their own in a few days. Others need more testing and treatment. If your pain continues or gets worse, you need to be rechecked and may need more tests to find out what is wrong. You may need surgery to correct the problem. Don't ignore new symptoms, such as fever, nausea and vomiting, urination problems, pain that gets worse, and dizziness. These may be signs of a more serious problem. Your doctor may have recommended a follow-up visit in the next 8 to 12 hours. If you are not getting better, you may need more tests or treatment. The doctor has checked you carefully, but problems can develop later. If you notice any problems or new symptoms, get medical treatment right away. Follow-up care is a key part of your treatment and safety. Be sure to make and go to all appointments, and call your doctor if you are having problems. It's also a good idea to know your test results and keep a list of the medicines you take. How can you care for yourself at home? · Rest until you feel better. · To prevent dehydration, drink plenty of fluids, enough so that your urine is light yellow or clear like water. Choose water and other caffeine-free clear liquids until you feel better. If you have kidney, heart, or liver disease and have to limit fluids, talk with your doctor before you increase the amount of fluids you drink. · If your stomach is upset, eat mild foods, such as rice, dry toast or crackers, bananas, and applesauce. Try eating several small meals instead of two or three large ones. · Wait until 48 hours after all symptoms have gone away before you have spicy foods, alcohol, and drinks that contain caffeine. · Do not eat foods that are high in fat. · Avoid anti-inflammatory medicines such as aspirin, ibuprofen (Advil, Motrin), and naproxen (Aleve). These can cause stomach upset. Talk to your doctor if you take daily aspirin for another health problem. When should you call for help? Call 911 anytime you think you may need emergency care. For example, call if:    · You passed out (lost consciousness).     · You pass maroon or very bloody stools.     · You vomit blood or what looks like coffee grounds.     · You have new, severe belly pain.    Call your doctor now or seek immediate medical care if:    · Your pain gets worse, especially if it becomes focused in one area of your belly.     · You have a new or higher fever.     · Your stools are black and look like tar, or they have streaks of blood.     · You have unexpected vaginal bleeding.     · You have symptoms of a urinary tract infection. These may include:  ? Pain when you urinate. ? Urinating more often than usual.  ? Blood in your urine.     · You are dizzy or lightheaded, or you feel like you may faint.    Watch closely for changes in your health, and be sure to contact your doctor if:    · You are not getting better after 1 day (24 hours). Where can you learn more? Go to http://joaoQustodioladi.info/. Enter H761 in the search box to learn more about \"Abdominal Pain: Care Instructions. \"  Current as of: June 26, 2019  Content Version: 12.2  © 0499-0689 [employer]. Care instructions adapted under license by [employer] (which disclaims liability or warranty for this information). If you have questions about a medical condition or this instruction, always ask your healthcare professional. [place] [address] any warranty or liability for your use of this information. Patient Education        Urinary Tract Infection in Women: Care Instructions  Your Care Instructions    A urinary tract infection, or UTI, is a general term for an infection anywhere between the kidneys and the urethra (where urine comes out).  Most UTIs are bladder infections. They often cause pain or burning when you urinate. UTIs are caused by bacteria and can be cured with antibiotics. Be sure to complete your treatment so that the infection goes away. Follow-up care is a key part of your treatment and safety. Be sure to make and go to all appointments, and call your doctor if you are having problems. It's also a good idea to know your test results and keep a list of the medicines you take. How can you care for yourself at home? · Take your antibiotics as directed. Do not stop taking them just because you feel better. You need to take the full course of antibiotics. · Drink extra water and other fluids for the next day or two. This may help wash out the bacteria that are causing the infection. (If you have kidney, heart, or liver disease and have to limit fluids, talk with your doctor before you increase your fluid intake.)  · Avoid drinks that are carbonated or have caffeine. They can irritate the bladder. · Urinate often. Try to empty your bladder each time. · To relieve pain, take a hot bath or lay a heating pad set on low over your lower belly or genital area. Never go to sleep with a heating pad in place. To prevent UTIs  · Drink plenty of water each day. This helps you urinate often, which clears bacteria from your system. (If you have kidney, heart, or liver disease and have to limit fluids, talk with your doctor before you increase your fluid intake.)  · Urinate when you need to. · Urinate right after you have sex. · Change sanitary pads often. · Avoid douches, bubble baths, feminine hygiene sprays, and other feminine hygiene products that have deodorants. · After going to the bathroom, wipe from front to back. When should you call for help?   Call your doctor now or seek immediate medical care if:    · Symptoms such as fever, chills, nausea, or vomiting get worse or appear for the first time.     · You have new pain in your back just below your rib cage. This is called flank pain.     · There is new blood or pus in your urine.     · You have any problems with your antibiotic medicine.    Watch closely for changes in your health, and be sure to contact your doctor if:    · You are not getting better after taking an antibiotic for 2 days.     · Your symptoms go away but then come back. Where can you learn more? Go to http://joao-ladi.info/. Enter C168 in the search box to learn more about \"Urinary Tract Infection in Women: Care Instructions. \"  Current as of: 2018  Content Version: 12.2  © 0961-4735 [employer]. Care instructions adapted under license by [employer] (which disclaims liability or warranty for this information). If you have questions about a medical condition or this instruction, always ask your healthcare professional. [address] any warranty or liability for your use of this information. [employer] Activation    Thank you for requesting access to [employer]. Please follow the instructions below to securely access and download your online medical record. [employer] allows you to send messages to your doctor, view your test results, renew your prescriptions, schedule appointments, and more. How Do I Sign Up? 1. In your internet browser, go to [email or web address]  2. Click on the First Time User? Click Here link in the Sign In box. You will be redirect to the New Member Sign Up page. 3. Enter your [employer] Access Code exactly as it appears below. You will not need to use this code after youve completed the sign-up process. If you do not sign up before the expiration date, you must request a new code. [employer] Access Code: [email or web address]  Expires: 2019 12:43 PM (This is the date your [employer] access code will )    4. Enter the last four digits of your Social Security Number (xxxx) and Date of Birth (mm/dd/yyyy) as indicated and click Submit. You will be taken to the next sign-up page. 5. Create a SimpleHoneyt ID. This will be your Qio login ID and cannot be changed, so think of one that is secure and easy to remember. 6. Create a Qio password. You can change your password at any time. 7. Enter your Password Reset Question and Answer. This can be used at a later time if you forget your password. 8. Enter your e-mail address. You will receive e-mail notification when new information is available in [address]. 9. Click Sign Up. You can now view and download portions of your medical record. 10. Click the Download Summary menu link to download a portable copy of your medical information. Additional Information    If you have questions, please visit the Frequently Asked Questions section of the Qio website at [email or web address] Remember, Qio is NOT to be used for urgent needs. For medical emergencies, dial 911.

## 2019-10-12 NOTE — ED NOTES
Patient here with c/o abdominal pains. Patient reports symptoms x5 days, states worse on left side. Patient states \"I be holding in my pee at work and I think it might be from that. \"  Patient denies changes to diet, denies N/V/D. Patient denies dysuria or foul odors. Emergency Department Nursing Plan of Care       The Nursing Plan of Care is developed from the Nursing assessment and Emergency Department Attending provider initial evaluation. The plan of care may be reviewed in the ED Provider note.     The Plan of Care was developed with the following considerations:   Patient / Family readiness to learn indicated by:verbalized understanding  Persons(s) to be included in education: patient  Barriers to Learning/Limitations:No    Signed     Charmaine Ramos RN    10/12/2019   1:31 PM

## 2019-10-12 NOTE — ED PROVIDER NOTES
EMERGENCY DEPARTMENT HISTORY AND PHYSICAL EXAM      Date: 10/12/2019  Patient Name: Daniela Franklin    History of Presenting Illness     Chief Complaint   Patient presents with    Abdominal Pain     times one week       History Provided By: Patient    HPI: Daniela Franklin, 44 y.o. female presents to the ED with cc of lower abdominal pressure and urinary discomfort. This is been going on for 1 week. She thinks she may have a urinary tract infection. She denies associated fever, chills, nausea or vomiting. She is currently menstruating. She denies any vaginal discharge. She has no loss of appetite, nausea or diarrhea. There are no other complaints, changes, or physical findings at this time. PCP: None    No current facility-administered medications on file prior to encounter. Current Outpatient Medications on File Prior to Encounter   Medication Sig Dispense Refill    HYDROCHLOROTHIAZIDE PO Take  by mouth.  ALPRAZOLAM PO Take  by mouth.  ketorolac (TORADOL) 10 mg tablet Take 1 Tab by mouth every six (6) hours as needed for Pain. 10 Tab 0    [DISCONTINUED] naproxen (NAPROSYN) 500 mg tablet Take 1 Tab by mouth two (2) times daily (with meals). 20 Tab 0    albuterol (PROVENTIL HFA, VENTOLIN HFA, PROAIR HFA) 90 mcg/actuation inhaler Take 2 Puffs by inhalation every four (4) hours as needed for Wheezing. 1 Inhaler 0    inhalational spacing device 1 Each by Does Not Apply route as needed.  1 Device 0       Past History     Past Medical History:  Past Medical History:   Diagnosis Date    Arthritis     Asthma     bronchitis    Bronchitis     HTN, goal below 140/90 2012    Psychiatric disorder     bipolar       Past Surgical History:  Past Surgical History:   Procedure Laterality Date    DELIVERY       HX HEENT      4 wisdom teeth removed in 2011    HX TUBAL LIGATION      c section       Family History:  Family History   Problem Relation Age of Onset    Hypertension Father     Psychiatric Disorder Father         schzophrenia    Asthma Son     Asthma Son     Asthma Daughter        Social History:  Social History     Tobacco Use    Smoking status: Current Every Day Smoker     Packs/day: 1.00     Years: 12.00     Pack years: 12.00    Smokeless tobacco: Never Used   Substance Use Topics    Alcohol use: No    Drug use: No       Allergies: Allergies   Allergen Reactions    Codeine Nausea and Vomiting     Pt denies this allergy    Guaifenesin Nausea and Vomiting    Tessalon [Benzonatate] Other (comments)     Sneezing    Tramadol Rash         Review of Systems   Review of Systems   Constitutional: Negative. Negative for chills and fever. HENT: Negative. Negative for congestion and rhinorrhea. Respiratory: Negative. Negative for cough, chest tightness and wheezing. Cardiovascular: Negative. Negative for chest pain and palpitations. Gastrointestinal: Positive for abdominal pain. Negative for constipation, nausea and vomiting. Endocrine: Negative. Genitourinary: Positive for dysuria. Negative for decreased urine volume, flank pain, hematuria and pelvic pain. Musculoskeletal: Negative. Negative for back pain and neck pain. Skin: Negative. Negative for color change, pallor and rash. Neurological: Negative. Negative for dizziness, seizures, weakness, numbness and headaches. Hematological: Negative. Negative for adenopathy. Psychiatric/Behavioral: Negative. All other systems reviewed and are negative. Physical Exam   Physical Exam   Constitutional: She is oriented to person, place, and time. She appears well-developed and well-nourished. No distress. HENT:   Head: Normocephalic and atraumatic. Mouth/Throat: No oropharyngeal exudate. Eyes: Pupils are equal, round, and reactive to light. Conjunctivae are normal. Right eye exhibits no discharge. Left eye exhibits no discharge. No scleral icterus. Neck: Normal range of motion.  Neck supple. No JVD present. Cardiovascular: Normal rate, regular rhythm, normal heart sounds and intact distal pulses. Exam reveals no gallop and no friction rub. No murmur heard. Pulmonary/Chest: Effort normal and breath sounds normal. No stridor. No respiratory distress. She has no wheezes. She has no rales. She exhibits no tenderness. Abdominal: Soft. Bowel sounds are normal. She exhibits no distension and no mass. There is no hepatosplenomegaly. There is tenderness in the suprapubic area. There is no rigidity, no rebound, no guarding, no CVA tenderness, no tenderness at McBurney's point and negative Trujillo's sign. No hernia. Neurological: She is alert and oriented to person, place, and time. She displays normal reflexes. No cranial nerve deficit. She exhibits normal muscle tone. Coordination normal.   Skin: Skin is warm. No rash noted. She is not diaphoretic. No pallor. Vitals reviewed.       Diagnostic Study Results     Labs -     Recent Results (from the past 12 hour(s))   URINALYSIS W/ REFLEX CULTURE    Collection Time: 10/12/19  1:25 PM   Result Value Ref Range    Color RED      Appearance TURBID (A) CLEAR      Specific gravity 1.030 1.003 - 1.030      pH (UA) 5.0 5.0 - 8.0      Protein 30 (A) NEG mg/dL    Glucose NEGATIVE  NEG mg/dL    Ketone TRACE (A) NEG mg/dL    Blood LARGE (A) NEG      Urobilinogen 1.0 0.2 - 1.0 EU/dL    Nitrites NEGATIVE  NEG      Leukocyte Esterase SMALL (A) NEG      WBC 0-4 0 - 4 /hpf    RBC >100 (H) 0 - 5 /hpf    Epithelial cells FEW FEW /lpf    Bacteria NEGATIVE  NEG /hpf    UA:UC IF INDICATED CULTURE NOT INDICATED BY UA RESULT CNI     HCG URINE, QL. - POC    Collection Time: 10/12/19  1:25 PM   Result Value Ref Range    Pregnancy test,urine (POC) NEGATIVE  NEG     BILIRUBIN, CONFIRM    Collection Time: 10/12/19  1:25 PM   Result Value Ref Range    Bilirubin UA, confirm NEGATIVE  NEG         Radiologic Studies -   No orders to display     CT Results  (Last 48 hours)    None CXR Results  (Last 48 hours)    None          Medical Decision Making   I am the first provider for this patient. I reviewed the vital signs, available nursing notes, past medical history, past surgical history, family history and social history. Vital Signs-Reviewed the patient's vital signs. Patient Vitals for the past 12 hrs:   Temp Pulse Resp BP SpO2   10/12/19 1243 98.1 °F (36.7 °C) 79 18 (!) 117/99 98 %         Records Reviewed: Nursing Notes and Old Medical Records    Provider Notes (Medical Decision Making):   Differential diagnosis-urinary tract infection, renal colic, ovarian cyst, ectopic pregnancy, STD, appendicitis, hernia    Impression/plan-44year-old female with prior history of  but otherwise healthy to the ER with 1 week of intermittent lower abdominal pressure and urinary discomfort. Has had UTIs before. She is currently having her menstrual cycle. She denies associated nausea or vomiting. Clinically has no surgical signs. Her urinalysis reveals soft signs of possible UTI so we will treat her empirically with antibiotics. Have encouraged her return with worsening pain fever chills nausea vomiting    ED Course:   Initial assessment performed. The patients presenting problems have been discussed, and they are in agreement with the care plan formulated and outlined with them. I have encouraged them to ask questions as they arise throughout their visit.       0  Critical Care Time:   Home    Disposition:  Home    PLAN:  1. Current Discharge Medication List      START taking these medications    Details   cephALEXin (KEFLEX) 500 mg capsule Take 1 Cap by mouth four (4) times daily for 7 days. Qty: 28 Cap, Refills: 0         CONTINUE these medications which have CHANGED    Details   naproxen (NAPROSYN) 500 mg tablet Take 1 Tab by mouth two (2) times daily (with meals). Qty: 20 Tab, Refills: 0           2.    Follow-up Information     Follow up With Specialties Details Why [facility]  Schedule an appointment as soon as possible for a visit in 1 week As needed [place] [id number] [phone]    [facility] EMERGENCY DEPT Emergency Medicine  If symptoms worsen Patel Bob  [phone]        Return to ED if worse     Diagnosis     Clinical Impression:   1. Acute abdominal pain    2. Acute cystitis without hematuria        Attestations:    Anette Ashraf MD    Please note that this dictation was completed with [employer], the computer voice recognition software. Quite often unanticipated grammatical, syntax, homophones, and other interpretive errors are inadvertently transcribed by the computer software. Please disregard these errors. Please excuse any errors that have escaped final proofreading. Thank you.

## 2019-10-12 NOTE — LETTER
[facility] EMERGENCY DEPT 
[address] [address] 
[phone] Work/School Note Date: 10/12/2019 To Whom It May concern: 
 
Isac Tovar was seen and treated today in the emergency room by the following provider(s): 
Attending Provider: Dejon Small MD.   
 
Isac Tovar may return to work on 10/14/2019 . Sincerely, Madeline Gilmore RN

## 2019-11-27 ENCOUNTER — HOSPITAL ENCOUNTER (EMERGENCY)
Age: 39
Discharge: HOME | End: 2019-11-27
Attending: EMERGENCY MEDICINE
Payer: MEDICAID

## 2019-11-27 ENCOUNTER — APPOINTMENT (OUTPATIENT)
Dept: GENERAL RADIOLOGY | Age: 39
End: 2019-11-27
Attending: PHYSICIAN ASSISTANT
Payer: MEDICAID

## 2019-11-27 VITALS
DIASTOLIC BLOOD PRESSURE: 91 MMHG | RESPIRATION RATE: 18 BRPM | HEIGHT: 66 IN | TEMPERATURE: 98.6 F | SYSTOLIC BLOOD PRESSURE: 130 MMHG | BODY MASS INDEX: 43.08 KG/M2 | WEIGHT: 268.08 LBS | HEART RATE: 73 BPM | OXYGEN SATURATION: 96 %

## 2019-11-27 DIAGNOSIS — S40.011A CONTUSION OF RIGHT SHOULDER, INITIAL ENCOUNTER: Primary | ICD-10-CM

## 2019-11-27 PROCEDURE — L3650 SO 8 ABD RESTRAINT PRE OTS: HCPCS

## 2019-11-27 PROCEDURE — 99283 EMERGENCY DEPT VISIT LOW MDM: CPT

## 2019-11-27 PROCEDURE — 73030 X-RAY EXAM OF SHOULDER: CPT

## 2019-11-27 RX ORDER — TOPIRAMATE 50 MG/1
50 TABLET, FILM COATED ORAL DAILY
COMMUNITY

## 2019-11-27 NOTE — DISCHARGE INSTRUCTIONS
Patient Education        Shoulder Pain: Care Instructions  Your Care Instructions    You can hurt your shoulder by using it too much during an activity, such as fishing or baseball. It can also happen as part of the everyday wear and tear of getting older. Shoulder injuries can be slow to heal, but your shoulder should get better with time. Your doctor may recommend a sling to rest your shoulder. If you have injured your shoulder, you may need testing and treatment. Follow-up care is a key part of your treatment and safety. Be sure to make and go to all appointments, and call your doctor if you are having problems. It's also a good idea to know your test results and keep a list of the medicines you take. How can you care for yourself at home? · Take pain medicines exactly as directed. ? If the doctor gave you a prescription medicine for pain, take it as prescribed. ? If you are not taking a prescription pain medicine, ask your doctor if you can take an over-the-counter medicine. ? Do not take two or more pain medicines at the same time unless the doctor told you to. Many pain medicines contain acetaminophen, which is Tylenol. Too much acetaminophen (Tylenol) can be harmful. · If your doctor recommends that you wear a sling, use it as directed. Do not take it off before your doctor tells you to. · Put ice or a cold pack on the sore area for 10 to 20 minutes at a time. Put a thin cloth between the ice and your skin. · If there is no swelling, you can put moist heat, a heating pad, or a warm cloth on your shoulder. Some doctors suggest alternating between hot and cold. · Rest your shoulder for a few days. If your doctor recommends it, you can then begin gentle exercise of the shoulder, but do not lift anything heavy. When should you call for help? Call 911 anytime you think you may need emergency care. For example, call if:    · You have chest pain or pressure.  This may occur with:  ? Sweating. ? Shortness of breath. ? Nausea or vomiting. ? Pain that spreads from the chest to the neck, jaw, or one or both shoulders or arms. ? Dizziness or lightheadedness. ? A fast or uneven pulse. After calling 911, chew 1 adult-strength aspirin. Wait for an ambulance. Do not try to drive yourself.     · Your arm or hand is cool or pale or changes color.    Call your doctor now or seek immediate medical care if:    · You have signs of infection, such as:  ? Increased pain, swelling, warmth, or redness in your shoulder. ? Red streaks leading from a place on your shoulder. ? Pus draining from an area of your shoulder. ? Swollen lymph nodes in your neck, armpits, or groin. ? A fever.    Watch closely for changes in your health, and be sure to contact your doctor if:    · You cannot use your shoulder.     · Your shoulder does not get better as expected. Where can you learn more? Go to http://joao-ladi.info/. Enter N113 in the search box to learn more about \"Shoulder Pain: Care Instructions. \"  Current as of: June 26, 2019  Content Version: 12.2  © 0051-6009 [employer]. Care instructions adapted under license by [employer] (which disclaims liability or warranty for this information). If you have questions about a medical condition or this instruction, always ask your healthcare professional. [place] [address] any warranty or liability for your use of this information. Patient Education        Contusion: Care Instructions  Your Care Instructions    Contusion is the medical term for a bruise. It is the result of a direct blow or an impact, such as a fall. Contusions are common sports injuries. Most people think of a bruise as a black-and-blue spot. This happens when small blood vessels get torn and leak blood under the skin. But bones, muscles, and organs can also get bruised.  This may damage deep tissues but not cause a bruise you can see. The doctor will do a physical exam to find the location of your contusion. You may also have tests to make sure you do not have a more serious injury, such as a broken bone or nerve damage. These may include X-rays or other imaging tests like a CT scan or MRI. Deep-tissue contusions may cause pain and swelling. But if there is no serious damage, they will often get better in a few weeks with home treatment. The doctor has checked you carefully, but problems can develop later. If you notice any problems or new symptoms, get medical treatment right away. Follow-up care is a key part of your treatment and safety. Be sure to make and go to all appointments, and call your doctor if you are having problems. It's also a good idea to know your test results and keep a list of the medicines you take. How can you care for yourself at home? · Put ice or a cold pack on the sore area for 10 to 20 minutes at a time to stop swelling. Put a thin cloth between the ice pack and your skin. · Be safe with medicines. Read and follow all instructions on the label. ? If the doctor gave you a prescription medicine for pain, take it as prescribed. ? If you are not taking a prescription pain medicine, ask your doctor if you can take an over-the-counter medicine. · If you can, prop up the sore area on pillows as much as possible for the next few days. Try to keep the sore area above the level of your heart. When should you call for help? Call your doctor now or seek immediate medical care if:    · Your pain gets worse.     · You have new or worse swelling.     · You have tingling, weakness, or numbness in the area near the contusion.     · The area near the contusion is cold or pale.    Watch closely for changes in your health, and be sure to contact your doctor if:    · You do not get better as expected. Where can you learn more? Go to http://joao-ladi.info/.   Enter M615 in the search box to learn more about \"Contusion: Care Instructions. \"  Current as of: June 26, 2019  Content Version: 12.2  © 6259-8171 [employer]. Care instructions adapted under license by [employer] (which disclaims liability or warranty for this information). If you have questions about a medical condition or this instruction, always ask your healthcare professional. [address] any warranty or liability for your use of this information.

## 2019-11-27 NOTE — ED PROVIDER NOTES
EMERGENCY DEPARTMENT HISTORY AND PHYSICAL EXAM      Date: 11/27/2019  Patient Name: Jhoana Garcia    History of Presenting Illness     Chief Complaint   Patient presents with    Shoulder Pain     patient c/o right shoulder pain after being injured by electric door       History Provided By: Patient    HPI: Jhoana Garcia, 44 y.o. female presents by POV to the ED with cc of right (dominate) shoulder pain status post injury that occurred 4 days ago. She states she was walking into a store when a revolving door struck her in the upper arm. She had immediate onset of pain that has been constant since onset. The pain is non-radiating and worsens with movement. She has applied warm rags and taken over the counter medications without relief of her discomfort. There are no other complaints, changes, or physical findings at this time. Social Hx: Tobacco (1/2 ppd), EtOH (denies), Illicit drug use (denies)     PCP: None    No current facility-administered medications on file prior to encounter. Current Outpatient Medications on File Prior to Encounter   Medication Sig Dispense Refill    topiramate (TOPAMAX) 50 mg tablet Take 50 mg by mouth daily.  HYDROCHLOROTHIAZIDE PO Take  by mouth.  ALPRAZOLAM PO Take  by mouth.  [DISCONTINUED] naproxen (NAPROSYN) 500 mg tablet Take 1 Tab by mouth two (2) times daily (with meals). 20 Tab 0    [DISCONTINUED] ketorolac (TORADOL) 10 mg tablet Take 1 Tab by mouth every six (6) hours as needed for Pain. 10 Tab 0    [DISCONTINUED] albuterol (PROVENTIL HFA, VENTOLIN HFA, PROAIR HFA) 90 mcg/actuation inhaler Take 2 Puffs by inhalation every four (4) hours as needed for Wheezing. 1 Inhaler 0    [DISCONTINUED] inhalational spacing device 1 Each by Does Not Apply route as needed.  1 Device 0       Past History     Past Medical History:  Past Medical History:   Diagnosis Date    Arthritis     Asthma     bronchitis    Bronchitis     HTN, goal below 140/90 2012    Psychiatric disorder     bipolar       Past Surgical History:  Past Surgical History:   Procedure Laterality Date    DELIVERY       HX HEENT      4 wisdom teeth removed in 2011    HX TUBAL LIGATION      c section       Family History:  Family History   Problem Relation Age of Onset    Hypertension Father     Psychiatric Disorder Father         schzophrenia    Asthma Son     Asthma Son     Asthma Daughter        Social History:  Social History     Tobacco Use    Smoking status: Current Every Day Smoker     Packs/day: 1.00     Years: 12.00     Pack years: 12.00    Smokeless tobacco: Never Used   Substance Use Topics    Alcohol use: No    Drug use: No       Allergies: Allergies   Allergen Reactions    Codeine Nausea and Vomiting     Pt denies this allergy    Guaifenesin Nausea and Vomiting    Tessalon [Benzonatate] Other (comments)     Sneezing    Tramadol Rash         Review of Systems   Review of Systems    Physical Exam   Physical Exam  Vitals signs and nursing note reviewed. Constitutional:       General: She is not in acute distress. Appearance: She is well-developed. She is not diaphoretic. Comments: 44 y.o. -American female with elevated BMI   HENT:      Head: Normocephalic and atraumatic. Eyes:      General:         Right eye: No discharge. Left eye: No discharge. Conjunctiva/sclera: Conjunctivae normal.   Neck:      Musculoskeletal: Normal range of motion and neck supple. Cardiovascular:      Rate and Rhythm: Normal rate and regular rhythm. Heart sounds: Normal heart sounds. No murmur. Pulmonary:      Effort: Pulmonary effort is normal. No respiratory distress. Breath sounds: Normal breath sounds. Musculoskeletal:      Comments: R SHOULDER: No swelling, ecchymosis, or deformity. Mild tenderness palpation of the anterior shoulder. Full range of motion. Distal neurovascular status intact.   Symmetric  strength bilaterally. Amatory without difficulty. Skin:     General: Skin is warm and dry. Neurological:      Mental Status: She is alert and oriented to person, place, and time. Psychiatric:         Behavior: Behavior normal.         Diagnostic Study Results     Labs - none    Radiologic Studies -   XR SHOULDER RT AP/LAT MIN 2 V   Final Result   IMPRESSION: No evidence of acute traumatic injury involving the right shoulder. Medical Decision Making   I am the first provider for this patient. I reviewed the vital signs, available nursing notes, past medical history, past surgical history, family history and social history. Vital Signs-Reviewed the patient's vital signs. Patient Vitals for the past 12 hrs:   Temp Pulse Resp BP SpO2   11/27/19 0725 98.6 °F (37 °C) 73 18 (!) 130/91 96 %       Records Reviewed: Nursing Notes and Old Medical Records   The patient has a care management plan with [employer] this has been reviewed. Provider Notes (Medical Decision Making):   Fracture, sprain, strain, contusion, rotator cuff injury,     ED Course:   Initial assessment performed. The patients presenting problems have been discussed, and they are in agreement with the care plan formulated and outlined with them. I have encouraged them to ask questions as they arise throughout their visit. Tobacco Counseling  Discussed the risks of smoking and the benefits of smoking cessation as well as the long term sequelae of smoking with the patient. The patient verbalized their understanding. Counseled patient for approximately 3 minutes. Critical Care Time: None    Disposition:  DISCHARGE NOTE:  8:18 AM  The pt is ready for discharge. The pt's signs, symptoms, diagnosis, and discharge instructions have been discussed and pt has conveyed their understanding. The pt is to follow up as recommended or return to ER should their symptoms worsen. Plan has been discussed and pt is in agreement. PLAN:  1. Current Discharge Medication List      CONTINUE these medications which have NOT CHANGED    Details   topiramate (TOPAMAX) 50 mg tablet Take 50 mg by mouth daily. HYDROCHLOROTHIAZIDE PO Take  by mouth. ALPRAZOLAM PO Take  by mouth. 2.   Follow-up Information     Follow up With Specialties Details Why Contact Elliot Dorseyelia Mamadou, DO Orthopedic Surgery In 1 week As needed [address]  [address]  [phone]        3. Use sling as discussed  4. Rest and ice  5. Continue taking over the counter medications as needed  Return to ED if worse     Diagnosis     Clinical Impression:   1. Contusion of right shoulder, initial encounter          Please note that this dictation was completed with [employer], the computer voice recognition software. Quite often unanticipated grammatical, syntax, homophones, and other interpretive errors are inadvertently transcribed by the computer software. Please disregards these errors. Please excuse any errors that have escaped final proofreading. This note will not be viewable in [address]

## 2019-12-07 ENCOUNTER — APPOINTMENT (OUTPATIENT)
Dept: GENERAL RADIOLOGY | Age: 39
End: 2019-12-07
Attending: PHYSICIAN ASSISTANT
Payer: MEDICAID

## 2019-12-07 ENCOUNTER — HOSPITAL ENCOUNTER (EMERGENCY)
Age: 39
Discharge: HOME | End: 2019-12-07
Attending: EMERGENCY MEDICINE
Payer: MEDICAID

## 2019-12-07 VITALS
HEART RATE: 70 BPM | TEMPERATURE: 98.3 F | RESPIRATION RATE: 16 BRPM | DIASTOLIC BLOOD PRESSURE: 102 MMHG | BODY MASS INDEX: 43.08 KG/M2 | HEIGHT: 66 IN | OXYGEN SATURATION: 100 % | WEIGHT: 268.08 LBS | SYSTOLIC BLOOD PRESSURE: 135 MMHG

## 2019-12-07 DIAGNOSIS — S16.1XXA STRAIN OF NECK MUSCLE, INITIAL ENCOUNTER: Primary | ICD-10-CM

## 2019-12-07 DIAGNOSIS — M50.30 DDD (DEGENERATIVE DISC DISEASE), CERVICAL: ICD-10-CM

## 2019-12-07 DIAGNOSIS — R03.0 ELEVATED BLOOD PRESSURE READING: ICD-10-CM

## 2019-12-07 LAB
AMPHET UR QL SCN: NEGATIVE
BARBITURATES UR QL SCN: NEGATIVE
BENZODIAZ UR QL: NEGATIVE
CANNABINOIDS UR QL SCN: POSITIVE
COCAINE UR QL SCN: NEGATIVE
DRUG SCRN COMMENT,DRGCM: ABNORMAL
METHADONE UR QL: NEGATIVE
OPIATES UR QL: NEGATIVE
PCP UR QL: NEGATIVE

## 2019-12-07 PROCEDURE — 74011250636 HC RX REV CODE- 250/636: Performed by: PHYSICIAN ASSISTANT

## 2019-12-07 PROCEDURE — 99282 EMERGENCY DEPT VISIT SF MDM: CPT

## 2019-12-07 PROCEDURE — 80307 DRUG TEST PRSMV CHEM ANLYZR: CPT

## 2019-12-07 PROCEDURE — 72050 X-RAY EXAM NECK SPINE 4/5VWS: CPT

## 2019-12-07 PROCEDURE — 96372 THER/PROPH/DIAG INJ SC/IM: CPT

## 2019-12-07 RX ORDER — KETOROLAC TROMETHAMINE 30 MG/ML
30 INJECTION, SOLUTION INTRAMUSCULAR; INTRAVENOUS
Status: COMPLETED | OUTPATIENT
Start: 2019-12-07 | End: 2019-12-07

## 2019-12-07 RX ORDER — KETOROLAC TROMETHAMINE 10 MG/1
10 TABLET, FILM COATED ORAL
Qty: 20 TAB | Refills: 0 | Status: SHIPPED | OUTPATIENT
Start: 2019-12-07 | End: 2020-03-09 | Stop reason: ALTCHOICE

## 2019-12-07 RX ORDER — DIAZEPAM 5 MG/1
5 TABLET ORAL
Status: DISCONTINUED | OUTPATIENT
Start: 2019-12-07 | End: 2019-12-07

## 2019-12-07 RX ORDER — METHOCARBAMOL 750 MG/1
750 TABLET, FILM COATED ORAL 4 TIMES DAILY
Qty: 30 TAB | Refills: 0 | Status: SHIPPED | OUTPATIENT
Start: 2019-12-07 | End: 2020-12-17

## 2019-12-07 RX ORDER — METHYLPREDNISOLONE 4 MG/1
TABLET ORAL
Qty: 1 DOSE PACK | Refills: 0 | Status: SHIPPED | OUTPATIENT
Start: 2019-12-07 | End: 2020-09-15

## 2019-12-07 RX ADMIN — KETOROLAC TROMETHAMINE 30 MG: 30 INJECTION, SOLUTION INTRAMUSCULAR at 10:37

## 2019-12-07 NOTE — ED PROVIDER NOTES
EMERGENCY DEPARTMENT HISTORY AND PHYSICAL EXAM      Date: 2019  Patient Name: Bhavya Stephens    History of Presenting Illness     HPI: Bhavya Stephens is a 44 y.o. female with past medical history of bronchitis, bipolar disorder, asthma presents to the emergency room for left-sided neck pain since yesterday. She reports she was helping putting up a [place] tree and was putting up ornaments when she noticed her pain. She says that her pain progressively worsened to a 10 out of 10, positional, pain that radiates to her head and down her back, left-sided cervical area. She denies focal weakness, numbness, fever, recent illness, nausea vomiting, photophobia, recent trauma, among other associated symptoms. Patient's care management plan was reviewed    Pertinent social history: Current smoker, denies alcohol use, denies street drug use    Pertinent surgical history: None    PCP: None    Current Outpatient Medications   Medication Sig Dispense Refill    ketorolac (TORADOL) 10 mg tablet Take 1 Tab by mouth every six (6) hours as needed for Pain. 20 Tab 0    methocarbamol (ROBAXIN-750) 750 mg tablet Take 1 Tab by mouth four (4) times daily. 30 Tab 0    methylPREDNISolone (MEDROL, USAMA,) 4 mg tablet Dosepak taper 1 Dose Pack 0    topiramate (TOPAMAX) 50 mg tablet Take 50 mg by mouth daily.  HYDROCHLOROTHIAZIDE PO Take  by mouth.  ALPRAZOLAM PO Take  by mouth.          Past History     Past Medical History:  Past Medical History:   Diagnosis Date    Arthritis     Asthma     bronchitis    Bronchitis     HTN, goal below 140/90 2012    Psychiatric disorder     bipolar       Past Surgical History:  Past Surgical History:   Procedure Laterality Date    DELIVERY       HX HEENT      4 wisdom teeth removed in 2011    HX TUBAL LIGATION      c section       Family History:  Family History   Problem Relation Age of Onset    Hypertension Father     Psychiatric Disorder Father schzophrenia    Asthma Son     Asthma Son     Asthma Daughter        Social History:  Social History     Tobacco Use    Smoking status: Current Every Day Smoker     Packs/day: 1.00     Years: 12.00     Pack years: 12.00    Smokeless tobacco: Never Used   Substance Use Topics    Alcohol use: No    Drug use: No       Allergies: Allergies   Allergen Reactions    Codeine Nausea and Vomiting     Pt denies this allergy    Guaifenesin Nausea and Vomiting    Tessalon [Benzonatate] Other (comments)     Sneezing    Tramadol Rash         Review of Systems   Review of Systems   Constitutional: Negative for chills and fever. HENT: Negative for congestion and sore throat. Respiratory: Negative for shortness of breath. Cardiovascular: Negative for chest pain. Gastrointestinal: Negative for nausea and vomiting. Genitourinary:        Denies pregnancy   Musculoskeletal: Positive for neck pain. Negative for back pain. Skin: Negative for rash. Neurological: Negative for weakness, light-headedness, numbness and headaches. Physical Exam     Vitals:    12/07/19 0927   BP: (!) 135/102   Pulse: 70   Resp: 16   Temp: 98.3 °F (36.8 °C)   SpO2: 100%   Weight: 121.6 kg (268 lb 1.3 oz)   Height: 5' 6\" (1.676 m)     Physical Exam  Vitals signs and nursing note reviewed. Constitutional:       General: She is not in acute distress. Appearance: She is well-developed. She is not diaphoretic. HENT:      Head: Normocephalic and atraumatic. Right Ear: Tympanic membrane, ear canal and external ear normal.      Left Ear: Tympanic membrane, ear canal and external ear normal.      Mouth/Throat:      Mouth: Mucous membranes are moist.      Pharynx: No oropharyngeal exudate. Eyes:      Pupils: Pupils are equal, round, and reactive to light. Neck:      Musculoskeletal: Neck supple.       Comments: Cervical spine: Mild left-sided paraspinal tenderness, decreased range of motion, no focal weakness in upper extremities, no obvious trauma, no rash  Cardiovascular:      Rate and Rhythm: Normal rate and regular rhythm. Heart sounds: Normal heart sounds. Pulmonary:      Effort: Pulmonary effort is normal. No respiratory distress. Breath sounds: Normal breath sounds. No wheezing. Lymphadenopathy:      Cervical: No cervical adenopathy. Skin:     General: Skin is warm and dry. Coloration: Skin is not pale. Findings: No erythema or rash. Neurological:      Mental Status: She is alert and oriented to person, place, and time. Psychiatric:         Behavior: Behavior normal.         Thought Content: Thought content normal.         Judgment: Judgment normal.           Diagnostic Study Results     Labs -   No results found for this or any previous visit (from the past 12 hour(s)). Radiologic Studies -   XR SPINE CERV 4 OR 5 V   Final Result   IMPRESSION: No acute bony abnormality of the cervical spine . Degenerative disc   disease C5-6 and C6-7. [place] CT Results  (Last 48 hours)    None                Medical Decision Making   I am the first provider for this patient. I reviewed the vital signs, available nursing notes, past medical history, past surgical history, social history      ED Course and Progress notes:   Initial assessment performed. The patients presenting problems have been discussed, and they are in agreement with the care plan formulated and outlined with them. I have encouraged them to ask questions as they arise throughout their visit. on re evaluation pt is resting comfortably, and has no new complaints, changes, or physical findings. The patient has improved and is stable. Procedures:  Procedures    Critical Care Time: none    Vital Signs-Reviewed the patient's vital signs.   Vitals:    12/07/19 0927   BP: (!) 135/102   BP 1 Location: Left arm   BP Patient Position: At rest   Pulse: 70   Resp: 16   Temp: 98.3 °F (36.8 °C)   SpO2: 100%   Weight: 121.6 kg (268 lb 1.3 oz)   Height: 5' 6\" (1.676 m)       Medications Administered During ED Course  Medications   ketorolac (TORADOL) injection 30 mg (30 mg IntraMUSCular Given 12/7/19 1037)       HYPERTENSION COUNSELING  Patient denies chest pain, headache, shortness of breath,  sx's, abd pain. Patient is made aware of their elevated blood pressure and is instructed to follow up this week with their Primary Care for a recheck. Patient is counseled regarding consequences of chronic, uncontrolled hypertension including kidney disease, heart disease, stroke or even death. Patient states their understanding and agrees to follow up this week. Disposition:  D/c home    DISCHARGE NOTE:   The patient was counseled on diagnosis and care plan. All available lab and imaging results have been reviewed and were discussed with the patient, including all incidental findings. The likelihood of other entities in the differential is insufficient to justify any further testing for them. This was explained to the patient. Patient agrees with plan and agrees to follow up with PCP as recommended, or return to the ED immediately if their symptoms worsen. All medications were reviewed with the patient. All of pt's questions and concerns were addressed. The patient was advised that new or worsening symptoms would require further evaluation and should prompt immediate return to the Emergency Department. Discharge instructions have been provided and explained to the patient, along with reasons to return to the ED. Patient voices understanding and is agreeable with the plan for discharge. Patient is ready to go home. Follow-up Information     Follow up With Specialties Details Why Contact Info    Velasquez Lee DO Internal Medicine Schedule an appointment as soon as possible for a visit To establish care with a primary care provider and follow-up for above diagnosis for today's visit.  [address] [facility]  [phone]      [facility]ospit[facility] EMERGENCY DEPT Emergency Medicine Go to If symptoms worsen [address] [place] [address]  [phone]    [facility]  Schedule an appointment as soon as possible for a visit If your symptoms do not resolve in 7 to 10 days [facility]  [address] [facility],[address]  [address] [facility]  [phone]          Discharge Medication List as of 12/7/2019 10:57 AM      START taking these medications    Details   ketorolac (TORADOL) 10 mg tablet Take 1 Tab by mouth every six (6) hours as needed for Pain., Print, Disp-20 Tab, R-0      methocarbamol (ROBAXIN-750) 750 mg tablet Take 1 Tab by mouth four (4) times daily. , Print, Disp-30 Tab, R-0      methylPREDNISolone (MEDROL, USAMA,) 4 mg tablet Dosepak taper, Print, Disp-1 Dose Pack, R-0         CONTINUE these medications which have NOT CHANGED    Details   topiramate (TOPAMAX) 50 mg tablet Take 50 mg by mouth daily. , Historical Med      HYDROCHLOROTHIAZIDE PO Take  by mouth., Historical Med      ALPRAZOLAM PO Take  by mouth., Historical Med             Provider Notes (Medical Decision Making):   Differential diagnosis: Cervical strain, muscle spasm, osteoarthritis, disc herniation, low concern for fracture, meningitis, myelopathy, carotid artery dissection      Diagnosis     Clinical Impression:   1. Strain of neck muscle, initial encounter    2. DDD (degenerative disc disease), cervical    3. Elevated blood pressure reading        Please note that this dictation was completed with [employer], the computer voice recognition software. Quite often unanticipated grammatical, syntax, homophones, and other interpretive errors are inadvertently transcribed by the computer software. Please disregard these errors. Please excuse any errors that have escaped final proofreading. This note will not be viewable in [address]

## 2019-12-07 NOTE — LETTER
[address] 
[facility] EMERGENCY DEPT 
[address] [place] [address] [address] [address] [phone] Work/School Note Date: 12/7/2019 To Whom It May concern: 
 
Gonsalo Roberto was seen and treated today in the emergency room by the following provider(s): 
Attending Provider: Ankush Bernabe MD 
Physician Assistant: Bianca Reina, [facility] Gabe Torres. Gonsalo Roberto may return to work in 2 days Sincerely, 
 
 
 
 
CAROL Patel

## 2019-12-07 NOTE — DISCHARGE INSTRUCTIONS
Patient Education        Elevated Blood Pressure: Care Instructions  Your Care Instructions    Blood pressure is a measure of how hard the blood pushes against the walls of your arteries. It's normal for blood pressure to go up and down throughout the day. But if it stays up over time, you have high blood pressure. Two numbers tell you your blood pressure. The first number is the systolic pressure. It shows how hard the blood pushes when your heart is pumping. The second number is the diastolic pressure. It shows how hard the blood pushes between heartbeats, when your heart is relaxed and filling with blood. An ideal blood pressure in adults is less than 120/80 (say \"120 over 80\"). High blood pressure is 140/90 or higher. You have high blood pressure if your top number is 140 or higher or your bottom number is 90 or higher, or both. The main test for high blood pressure is simple, fast, and painless. To diagnose high blood pressure, your doctor will test your blood pressure at different times. After testing your blood pressure, your doctor may ask you to test it again when you are home. If you are diagnosed with high blood pressure, you can work with your doctor to make a long-term plan to manage it. Follow-up care is a key part of your treatment and safety. Be sure to make and go to all appointments, and call your doctor if you are having problems. It's also a good idea to know your test results and keep a list of the medicines you take. How can you care for yourself at home? · Do not smoke. Smoking increases your risk for heart attack and stroke. If you need help quitting, talk to your doctor about stop-smoking programs and medicines. These can increase your chances of quitting for good. · Stay at a healthy weight. · Try to limit how much sodium you eat to less than 2,300 milligrams (mg) a day. Your doctor may ask you to try to eat less than 1,500 mg a day. · Be physically active.  Get at least 30 minutes of exercise on most days of the week. Walking is a good choice. You also may want to do other activities, such as running, swimming, cycling, or playing tennis or team sports. · Avoid or limit alcohol. Talk to your doctor about whether you can drink any alcohol. · Eat plenty of fruits, vegetables, and low-fat dairy products. Eat less saturated and total fats. · Learn how to check your blood pressure at home. When should you call for help? Call your doctor now or seek immediate medical care if:  ? · Your blood pressure is much higher than normal (such as 180/110 or higher). ? · You think high blood pressure is causing symptoms such as:  ¨ Severe headache. ¨ Blurry vision. ? Watch closely for changes in your health, and be sure to contact your doctor if:  ? · You do not get better as expected. Where can you learn more? Go to http://joaoPoeticaladi.info/. Enter O450 in the search box to learn more about \"Elevated Blood Pressure: Care Instructions. \"  Current as of: September 21, 2016  Content Version: 11.4  © 1603-1600 [employer]. Care instructions adapted under license by [employer]rue (which disclaims liability or warranty for this information). If you have questions about a medical condition or this instruction, always ask your healthcare professional. [address] any warranty or liability for your use of this information. Thank you for allowing us to take care of you today! We hope we addressed all of your concerns and needs. We strive to provide excellent quality care in the Emergency Department. You will receive a survey after your visit to evaluate the care you were provided. Should you receive a survey from us, we invite you to share your experience and tell us what made it excellent.     It was a pleasure serving you, we invite you to share your experience with us, in our pursuit for excellence, should you be selected to receive a survey. The exam and treatment you received in the Emergency Department were for an urgent problem and are not intended as complete care. It is important that you follow up with a doctor, nurse practitioner, or physician assistant for ongoing care. If your symptoms become worse or you do not improve as expected and you are unable to reach your usual health care provider, you should return to the Emergency Department. We are available 24 hours a day. Please take your discharge instructions with you when you go to your follow-up appointment. If you have any problem arranging a follow-up appointment, contact the Emergency Department immediately. If a prescription has been provided, please have it filled as soon as possible to prevent a delay in treatment. Read the entire medication instruction sheet provided to you by the pharmacy. If you have any questions or reservations about taking the medication due to side effects or interactions with other medications, please call your primary care physician or contact the ER to speak with the charge nurse. Make an appointment with your family doctor or the physician you were referred to for follow-up of this visit as instructed on your discharge paperwork, as this is mandatory follow-up. Return to the ER if you are unable to be seen or if you are unable to be seen in a timely manner. If you have any problem arranging the follow-up visit, contact the Emergency Department immediately. I hope you feel better and thank you again for allow us to provide you with excellent care today!       Warmest regards,    Kylie Barry PA-C  Emergency Medicine Physician Assistant  Jean Freeman      Vitals:    12/07/19 0927   BP: (!) 135/102   BP 1 Location: Left arm   BP Patient Position: At rest   Pulse: 70   Resp: 16   Temp: 98.3 °F (36.8 °C)   SpO2: 100%   Weight: 121.6 kg (268 lb 1.3 oz)   Height: 5' 6\" (1.676 m)       No results found for this or any previous visit (from the past 12 hour(s)). XR SPINE CERV 4 OR 5 V   Final Result   IMPRESSION: No acute bony abnormality of the cervical spine . Degenerative disc   disease C5-6 and C6-7. Campos Muck CT Results  (Last 48 hours)    None            [facility] Departments     For adult and child immunizations, family planning, TB screening, STD testing and women's health services. [facility]: Stella [phone]      [facility] 25   657 Located within [facility]   [address]   170 [facility]: [facility] 200 [facility] [phone] 2400 [facility]          [address] [place] [address] [address]     For primary care services, woman and child wellness, and some clinics providing specialty care. [facility] -- 1011 [facility]. [address] [place] [address] [phone]   411 [facility] 200 [facility] [address] [place] [address] [phone]   339 [facility] [address] [place] [address] [phone]   [address] [facility] [employer] [address] [place] [address] 5850  Community  [phone]   [address] [place] [address] [place] [phone]   [facility] 81 [facility] [phone]   [address] [phone]   [facility]: [facility] [address] 79 [facility] [address] [phone]     [address] [phone]   [facility] Out[facility] 5850  Community  [phone]   [employer] [facility]  [address] ([email or web address]) [phone]         Sexual Health/Woman Wellness Clinics    For STD/HIV testing and treatment, pregnancy testing and services, men's health, birth control services, LGBT services, and hepatitis/HPV vaccine services. [facility] [employer] [address]49 [address] [phone]   [facility] [address], 5th floor [phone]   Pregnancy [address] [facility] [address]  [place] [phone]         [place] 9997 [facility] [address] [facility] [address]   [phone]   [place]   [phone]   Women, Infant and Children's Services: [address] [phone]       600 Critical access hospital Crisis Intervention   [phone]   [facility]   [phone] 6125 [facility] Psychiatry     [phone]   [address] Crisis   1212 \[facility]\"" [phone]       Local Primary Care Physicians  [facility] [phone]  MD Kaylen Watts MD Viki Andrew, MD [facility] [phone]  Sixto Montes, [facility]  MD Shari Tinsley MD Lundy East, MD [address] [place] [address] [phone]  MD Lucy Wesley MD [address] [facility] [phone]  MD Aaron Matthews MD Loma Searles, MD Pauleen Diesel, MD   [facility] [phone]  KKYS MD Minerva REYNA MD  [facility], NP [address] [phone]  MD Darrin Keating MD Jacqueline Pimple, MD Iola Buhl, MD Margarie Keeling, MD Debbe Rana, MD Leonora Boer, MD   [facility] [address] [phone]  Denny Eli MD [address] [phone]  MD Yecenia Prather, NP Jeanne Co, MD Reyna Lange, MD Sharlon Nielsen, MD Lorin Libman, MD Adolph Pi, MD   4042 [facility] [phone]  MD Deangelo Escobar, FNP  Deborah Hudson, NP Trent Copper, MD Hervey Sneddon, MD Hurman Mcardle, MD Vessie Poplar, MD [address] [place] [address] [phone]  MD Sebastián Conklin MD Rondel Argue, MD Kyung Tapia MD   [facility] [phone]  MD Orlando Ca MD [place] [phone]  MD Avelina García MD Mordecai Duncans, MD   6470 [facility] [phone]  Bill De Anda, MD Violeta Schwab, MD Deacon Steele, MD Hemant Reyna, MD Shabana Grigsby, MD Giovana Matias, NP  Axel Fleming MD 1619 [facility]   [phone]  MD Teto Gtz, MD Dariana Bonilla MD   2109 [facility] [phone]  Reji [facility], MD Marlene Wheeler, CLEMENCIA Oswald, ALISHA Oswald, P  Nan Young, MD Kayla Layne, NP   Alysia Handy,  Miscellaneous:  Esperanza Barraza [phone]       Patient Education        Cervical Disc Disease: Care Instructions  Your Care Instructions    Cervical disc disease results from damage, disease, or wear and tear to the discs between the bones (vertebra) in your neck. The discs act as shock absorbers for the spine and keep the spine flexible. When a disc is damaged, it can bulge out and press against the nerve roots or spinal cord. This is sometimes called a herniated or \"slipped disc. \" This pressure can cause pain and numbness or tingling in your arms and hands. It can also cause weakness in your legs. An accident can damage a disc and cause it to break open (rupture). Aging and hard physical work can also cause damage to cervical discs. The first treatments for cervical disc disease include physical therapy, special neck exercises, heat, and pain medicine. If these fail, your doctor may inject steroids and pain medicine into your neck. Surgery is usually done only if other treatments have not worked. Follow-up care is a key part of your treatment and safety.  Be sure to make and go to all appointments, and call your doctor if you are having problems. It's also a good idea to know your test results and keep a list of the medicines you take. How can you care for yourself at home? · Take pain medicines exactly as directed. ? If the doctor gave you a prescription medicine for pain, take it as prescribed. ? If you are not taking a prescription pain medicine, ask your doctor if you can take an over-the-counter medicine. · Don't spend too long in one position. Take short breaks to move around and change positions. · Wear a seat belt and shoulder harness when you are in a car. · Sleep with a pillow under your head and neck that keeps your neck straight. · Follow your doctor's instructions for gentle neck-stretching exercises. · Do not smoke. Smoking can slow healing of your discs. If you need help quitting, talk to your doctor about stop-smoking programs and medicines. These can increase your chances of quitting for good. · Avoid strenuous work or exercise until your doctor says it is okay. When should you call for help? Call 911 anytime you think you may need emergency care. For example, call if:    · You are unable to move an arm or a leg at all.   [facility] your doctor now or seek immediate medical care if:    · You have new or worse symptoms in your arms, legs, belly, or buttocks. Symptoms may include:  ? Numbness or tingling. ? Weakness. ? Pain.     · You lose bladder or bowel control.    Watch closely for changes in your health, and be sure to contact your doctor if:    · You do not get better as expected. Where can you learn more? Go to http://joao-ladi.info/. Enter N118 in the search box to learn more about \"Cervical Disc Disease: Care Instructions. \"  Current as of: June 26, 2019  Content Version: 12.2  © 9469-0813 [employer]ently[employer].  Care instructions adapted under license by [employer] (which disclaims liability or warranty for this information). If you have questions about a medical condition or this instruction, always ask your healthcare professional. [address] any warranty or liability for your use of this information. Patient Education        Neck Strain: Care Instructions  Your Care Instructions    You have strained the muscles and ligaments in your neck. A sudden, awkward movement can strain the neck. This often occurs with falls or car accidents or during certain sports. Everyday activities like working on a computer or sleeping can also cause neck strain if they force you to hold your neck in an awkward position for a long time. It is common for neck pain to get worse for a day or two after an injury, but it should start to feel better after that. You may have more pain and stiffness for several days before it gets better. This is expected. It may take a few weeks or longer for it to heal completely. Good home treatment can help you get better faster and avoid future neck problems. Follow-up care is a key part of your treatment and safety. Be sure to make and go to all appointments, and call your doctor if you are having problems. It's also a good idea to know your test results and keep a list of the medicines you take. How can you care for yourself at home? · If you were given a neck brace (cervical collar) to limit neck motion, wear it as instructed for as many days as your doctor tells you to. Do not wear it longer than you were told to. Wearing a brace for too long can make neck stiffness worse and weaken the neck muscles. · You can try using heat or ice to see if it helps. ? Try using a heating pad on a low or medium setting for 15 to 20 minutes every 2 to 3 hours. Try a warm shower in place of one session with the heating pad. You can also buy single-use heat wraps that last up to 8 hours. ? You can also try an ice pack for 10 to 15 minutes every 2 to 3 hours.   · Take pain medicines exactly as directed. ? If the doctor gave you a prescription medicine for pain, take it as prescribed. ? If you are not taking a prescription pain medicine, ask your doctor if you can take an over-the-counter medicine. · Gently rub the area to relieve pain and help with blood flow. Do not massage the area if it hurts to do so. · Do not do anything that makes the pain worse. Take it easy for a couple of days. You can do your usual activities if they do not hurt your neck or put it at risk for more stress or injury. · Try sleeping on a special neck pillow. Place it under your neck, not under your head. Placing a tightly rolled-up towel under your neck while you sleep will also work. If you use a neck pillow or rolled towel, do not use your regular pillow at the same time. · To prevent future neck pain, do exercises to stretch and strengthen your neck and back. Learn how to use good posture, safe lifting techniques, and proper body mechanics. When should you call for help? Call 911 anytime you think you may need emergency care. For example, call if:    · You are unable to move an arm or a leg at all.   [facility] your doctor now or seek immediate medical care if:    · You have new or worse symptoms in your arms, legs, chest, belly, or buttocks. Symptoms may include:  ? Numbness or tingling. ? Weakness. ? Pain.     · You lose bladder or bowel control.    Watch closely for changes in your health, and be sure to contact your doctor if:    · You are not getting better as expected. Where can you learn more? Go to http://joao-ladi.info/. Enter M253 in the search box to learn more about \"Neck Strain: Care Instructions. \"  Current as of: June 26, 2019  Content Version: 12.2  © 4233-7166 [employer]wise, Incorporated. Care instructions adapted under license by [employer] (which disclaims liability or warranty for this information).  If you have questions about a medical condition or this instruction, always ask your healthcare professional. [place] [address] any warranty or liability for your use of this information. Patient Education        Neck Strain or Sprain: Rehab Exercises  Introduction  Here are some examples of exercises for you to try. The exercises may be suggested for a condition or for rehabilitation. Start each exercise slowly. Ease off the exercises if you start to have pain. You will be told when to start these exercises and which ones will work best for you. How to do the exercises  Neck rotation    1. Sit in a firm chair, or stand up straight. 2. Keeping your chin level, turn your head to the right, and hold for 15 to 30 seconds. 3. Turn your head to the left and hold for 15 to 30 seconds. 4. Repeat 2 to 4 times to each side. Neck stretches    1. Look straight ahead, and tip your right ear to your right shoulder. Do not let your left shoulder rise up as you tip your head to the right. 2. Hold for 15 to 30 seconds. 3. Tilt your head to the left. Do not let your right shoulder rise up as you tip your head to the left. 4. Hold for 15 to 30 seconds. 5. Repeat 2 to 4 times to each side. Forward neck flexion    1. Sit in a firm chair, or stand up straight. 2. Bend your head forward. 3. Hold for 15 to 30 seconds. 4. Repeat 2 to 4 times. Lateral (side) bend strengthening    1. With your right hand, place your first two fingers on your right temple. 2. Start to bend your head to the side while using gentle pressure from your fingers to keep your head from bending. 3. Hold for about 6 seconds. 4. Repeat 8 to 12 times. 5. Switch hands and repeat the same exercise on your left side. Forward bend strengthening    1. Place your first two fingers of either hand on your forehead. 2. Start to bend your head forward while using gentle pressure from your fingers to keep your head from bending. 3. Hold for about 6 seconds. 4. Repeat 8 to 12 times.     Neutral position strengthening    1. Using one hand, place your fingertips on the back of your head at the top of your neck. 2. Start to bend your head backward while using gentle pressure from your fingers to keep your head from bending. 3. Hold for about 6 seconds. 4. Repeat 8 to 12 times. Chin tuck    1. Lie on the floor with a rolled-up towel under your neck. Your head should be touching the floor. 2. Slowly bring your chin toward your chest.  3. Hold for a count of 6, and then relax for up to 10 seconds. 4. Repeat 8 to 12 times. Follow-up care is a key part of your treatment and safety. Be sure to make and go to all appointments, and call your doctor if you are having problems. It's also a good idea to know your test results and keep a list of the medicines you take. Where can you learn more? Go to http://joao-ladi.info/. Enter M679 in the search box to learn more about \"Neck Strain or Sprain: Rehab Exercises. \"  Current as of: June 26, 2019  Content Version: 12.2  © 3773-8219 edo. Care instructions adapted under license by [employer] (which disclaims liability or warranty for this information). If you have questions about a medical condition or this instruction, always ask your healthcare professional. [address] any warranty or liability for your use of this information. Patient Education        Neck Spasm: Care Instructions  Your Care Instructions  A neck spasm is sudden tightness and pain in your neck muscles. A spasm may be caused by some activities or repeated movements. For example, you may be more likely to have a neck spasm if you slouch, paint a ceiling, work at a computer, or sleep with your neck twisted. But the cause isn't always clear. Home treatment includes using heat or ice, taking over-the-counter (OTC) pain medicines, and avoiding activities that may lead to neck pain.  Gentle stretching, or treatments such as massage or manipulation, may also help ease a neck spasm. For a neck spasm that doesn't get better with home care, your doctor may prescribe medicine. He or she may also suggest exercise or physical therapy to help strengthen or relax your neck muscles. Follow-up care is a key part of your treatment and safety. Be sure to make and go to all appointments, and call your doctor if you are having problems. It's also a good idea to know your test results and keep a list of the medicines you take. How can you care for yourself at home? · To relieve pain, use heat or ice (whichever feels better) on the affected area. ? Put a warm water bottle, a heating pad set on low, or a warm cloth on your neck. Put a thin cloth between the heating pad and your skin. Do not go to sleep with a heating pad on your skin. ? Try ice or a cold pack on the area for 10 to 20 minutes at a time. Put a thin cloth between the ice and your skin. · Ask your doctor if you can take acetaminophen (such as Tylenol) or nonsteroidal anti-inflammatory drugs, such as ibuprofen or naproxen. Your doctor can prescribe stronger medicines if needed. Be safe with medicines. Read and follow all instructions on the label. · Stretch your muscles every day, especially before and after exercise and at bedtime. Regular stretching can help relax your muscles. · Try to find a pillow and a position in bed that help improve your night's rest.  · Try to stay active. It's best to start activity slowly. If an exercise makes your pain worse, stop doing it. When should you call for help? Call 911 anytime you think you may need emergency care. For example, call if:    · You are unable to move an arm or a leg at all.   [facility] your doctor now or seek immediate medical care if:    · You have new or worse symptoms in your arms, legs, belly, or buttocks. Symptoms may include:  ? Numbness or tingling. ? Weakness. ? Pain.     · You lose bladder or bowel control.  Watch closely for changes in your health, and be sure to contact your doctor if:    · You do not get better as expected. Where can you learn more? Go to http://joao-ladi.info/. Enter T567 in the search box to learn more about \"Neck Spasm: Care Instructions. \"  Current as of: June 26, 2019  Content Version: 12.2  © 8722-0320 [email or web address]. Care instructions adapted under license by [employer] (which disclaims liability or warranty for this information). If you have questions about a medical condition or this instruction, always ask your healthcare professional. [place] [address] any warranty or liability for your use of this information.

## 2020-02-14 ENCOUNTER — APPOINTMENT (OUTPATIENT)
Dept: GENERAL RADIOLOGY | Age: 40
End: 2020-02-14
Attending: EMERGENCY MEDICINE
Payer: MEDICAID

## 2020-02-14 ENCOUNTER — HOSPITAL ENCOUNTER (EMERGENCY)
Age: 40
Discharge: HOME | End: 2020-02-14
Attending: EMERGENCY MEDICINE
Payer: MEDICAID

## 2020-02-14 VITALS
DIASTOLIC BLOOD PRESSURE: 78 MMHG | WEIGHT: 278 LBS | OXYGEN SATURATION: 98 % | HEIGHT: 66 IN | BODY MASS INDEX: 44.68 KG/M2 | TEMPERATURE: 98.4 F | SYSTOLIC BLOOD PRESSURE: 149 MMHG | HEART RATE: 95 BPM | RESPIRATION RATE: 18 BRPM
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
## 2020-02-14 DIAGNOSIS — M25.572 ACUTE LEFT ANKLE PAIN: Primary | ICD-10-CM

## 2020-02-14 PROCEDURE — 73610 X-RAY EXAM OF ANKLE: CPT

## 2020-02-14 PROCEDURE — 73562 X-RAY EXAM OF KNEE 3: CPT

## 2020-02-14 PROCEDURE — 99282 EMERGENCY DEPT VISIT SF MDM: CPT

## 2020-02-14 NOTE — ED NOTES
Pt discharged by  Jackie Mcdowell MD. Pt provided with discharge instructions Rx and instructions on follow up care. Pt out of ED ambulatory accompanied by self.

## 2020-02-14 NOTE — ED NOTES
Bedside and Verbal shift change report given to Star Montes (oncoming nurse) by Bridget Perez (offgoing nurse). Report included the following information SBAR, Kardex and ED Summary.

## 2020-02-14 NOTE — DISCHARGE INSTRUCTIONS
Patient Education     Musculoskeletal Pain: Care Instructions  Your Care Instructions  Different problems with the bones, muscles, nerves, ligaments, and tendons in the body can cause pain. One or more areas of your body may ache or burn. Or they may feel tired, stiff, or sore. The medical term for this type of pain is musculoskeletal pain. It can have many different causes. Sometimes the pain is caused by an injury such as a strain or sprain. Or you might have pain from using one part of your body in the same way over and over again. This is called overuse. In some cases, the cause of the pain is another health problem such as arthritis or fibromyalgia. The doctor will examine you and ask you questions about your health to help find the cause of your pain. Blood tests or imaging tests like an X-ray may also be helpful. But sometimes doctors can't find a cause of the pain. Treatment depends on your symptoms and the cause of the pain, if known. The doctor has checked you carefully, but problems can develop later. If you notice any problems or new symptoms, get medical treatment right away. Follow-up care is a key part of your treatment and safety. Be sure to make and go to all appointments, and call your doctor if you are having problems. It's also a good idea to know your test results and keep a list of the medicines you take. How can you care for yourself at home? · Rest until you feel better. · Do not do anything that makes the pain worse. Return to exercise gradually if you feel better and your doctor says it's okay. · Be safe with medicines. Read and follow all instructions on the label. ¨ If the doctor gave you a prescription medicine for pain, take it as prescribed. ¨ If you are not taking a prescription pain medicine, ask your doctor if you can take an over-the-counter medicine. · Put ice or a cold pack on the area for 10 to 20 minutes at a time to ease pain.  Put a thin cloth between the ice and your skin. When should you call for help? Call your doctor now or seek immediate medical care if:  · You have new pain, or your pain gets worse. · You have new symptoms such as a fever, a rash, or chills. Watch closely for changes in your health, and be sure to contact your doctor if:  · You do not get better as expected. Where can you learn more? Go to [employer]  Enter Q624 in the search box to learn more about \"Musculoskeletal Pain: Care Instructions. \"   © 6917-0869 Healthwise, Incorporated. Care instructions adapted under license by [facility] (which disclaims liability or warranty for this information). This care instruction is for use with your licensed healthcare professional. If you have questions about a medical condition or this instruction, always ask your healthcare professional. [address] any warranty or liability for your use of this information.   Content Version: 60.6.850808; Current as of: November 20, 2015

## 2020-02-14 NOTE — ED NOTES
Received report from Nanette León, [facility]. Assumed care of pt at this time.

## 2020-02-14 NOTE — ED PROVIDER NOTES
EMERGENCY DEPARTMENT HISTORY AND PHYSICAL EXAM      Date: 2/14/2020  Patient Name: Aaron Zhou    Please note that this dictation was completed with [employer], the computer voice recognition software. Quite often unanticipated grammatical, syntax, homophones, and other interpretive errors are inadvertently transcribed by the computer software. Please disregard these errors. Please excuse any errors that have escaped final proofreading. History of Presenting Illness     Chief Complaint   Patient presents with    Ankle Pain     onset of sxs,1330 02/13/20 - slipped while at [address] from a water puddle leading to (L) ankle (\"being twisted\") and (L) knee pain s/p hyperextensive of the affected site - ambulatory in to triage - Denies surgeries to the affected sites         History Provided By: Patient    HPI: Aaron Zhou, 36 y.o. female, complaining of left knee and ankle pain that began yesterday after slipping and falling at [address] in a puddle. Rates her pain is severe. Worse with walking, but still able to bear weight and ambulate. Nothing makes it better. No distal numbness or tingling. No other acute medical conditions at this time. Patient mostly complained of pain in the left ankle and the left knee. No deformity noted. No other exacerbating relieving factors    PCP: None    No current facility-administered medications on file prior to encounter. Current Outpatient Medications on File Prior to Encounter   Medication Sig Dispense Refill    ketorolac (TORADOL) 10 mg tablet Take 1 Tab by mouth every six (6) hours as needed for Pain. 20 Tab 0    methocarbamol (ROBAXIN-750) 750 mg tablet Take 1 Tab by mouth four (4) times daily. 30 Tab 0    methylPREDNISolone (MEDROL, USAMA,) 4 mg tablet Dosepak taper 1 Dose Pack 0    topiramate (TOPAMAX) 50 mg tablet Take 50 mg by mouth daily.  HYDROCHLOROTHIAZIDE PO Take  by mouth.  ALPRAZOLAM PO Take  by mouth.          Past History     Past Medical History:  Past Medical History:   Diagnosis Date    Arthritis     Asthma     bronchitis    Bronchitis     HTN, goal below 140/90 2012    Psychiatric disorder     bipolar       Past Surgical History:  Past Surgical History:   Procedure Laterality Date    DELIVERY       HX HEENT      4 wisdom teeth removed in 2011    HX TUBAL LIGATION      c section       Family History:  Family History   Problem Relation Age of Onset    Hypertension Father     Psychiatric Disorder Father         schzophrenia    Asthma Son     Asthma Son     Asthma Daughter        Social History:  Social History     Tobacco Use    Smoking status: Current Every Day Smoker     Packs/day: 1.00     Years: 12.00     Pack years: 12.00    Smokeless tobacco: Never Used   Substance Use Topics    Alcohol use: No    Drug use: No       Allergies: Allergies   Allergen Reactions    Codeine Nausea and Vomiting     Pt denies this allergy    Guaifenesin Nausea and Vomiting    Tessalon [Benzonatate] Other (comments)     Sneezing    Tramadol Rash         Review of Systems   Review of Systems   Constitutional: Negative for fever. HENT: Negative for congestion. Respiratory: Negative for shortness of breath. Gastrointestinal: Negative for nausea and vomiting. Musculoskeletal: Positive for arthralgias. Negative for back pain and myalgias. Skin: Negative for wound. Neurological: Negative for numbness. Hematological: Does not bruise/bleed easily. Psychiatric/Behavioral: Negative for behavioral problems. Physical Exam   Physical Exam   Constitutional: oriented to person, place, and time. appears well-developed and well-nourished. HENT:   Head: Normocephalic and atraumatic.    Neck trachea midline   Cardiovascular: Normal peripheral perfusion  Pulmonary/Chest: No respiratory distress, equal chest rise  Abdominal: Nondistended abdomen  Musculoskeletal: No deformity  Neurological: alert and oriented to person, place, and time. Skin: No rash noted. No erythema. Psychiatric: behavior is normal.   Nursing note and vitals reviewed. Diagnostic Study Results     Labs -   No results found for this or any previous visit (from the past 12 hour(s)). Radiologic Studies -   XR ANKLE LT MIN 3 V    (Results Pending)   XR KNEE LT 3 V    (Results Pending)     CT Results  (Last 48 hours)    None        CXR Results  (Last 48 hours)    None            Medical Decision Making   I am the first provider for this patient. I reviewed the vital signs, available nursing notes, past medical history, past surgical history, family history and social history. Vital Signs-Reviewed the patient's vital signs. Patient Vitals for the past 12 hrs:   Temp Pulse Resp BP SpO2   02/14/20 0609 98.4 °F (36.9 °C) 95 18 149/78 98 %       Records Reviewed: Nursing Notes    Provider Notes (Medical Decision Making):   Sprain strain fracture contusion. No concern for vascular injury. ED Course:   Initial assessment performed. The patients presenting problems have been discussed, and they are in agreement with the care plan formulated and outlined with them. I have encouraged them to ask questions as they arise throughout their visit. Care management plan:  Care management plan discussed with patient. Multiple visits to the ED, requesting opiates. SERGEI document reviewed. Disposition:  DISCHARGE NOTE  Patients results have been reviewed with them. Patient and/or family have verbally conveyed their understanding and agreement of the patient's signs, symptoms, diagnosis, treatment and prognosis and additionally agree to follow up as recommended or return to the Emergency Room should their condition change or have any new concerns prior to their follow-up appointment. Patient verbally agrees with the care-plan and verbally conveys that all of their questions have been answered.    Discharge instructions have also been provided to the patient with some educational information regarding their diagnosis as well a list of reasons why they would want to return to the ER prior to their follow-up appointment should their condition change. PLAN:  1. Current Discharge Medication List        2. Follow-up Information    None         Return to ED if worse     Diagnosis     Clinical Impression: No diagnosis found. Attestations:   This note was completed by Evie Espinoza DO

## 2020-03-09 ENCOUNTER — HOSPITAL ENCOUNTER (EMERGENCY)
Age: 40
Discharge: HOME | End: 2020-03-09
Attending: EMERGENCY MEDICINE
Payer: MEDICAID

## 2020-03-09 VITALS
RESPIRATION RATE: 18 BRPM | OXYGEN SATURATION: 99 % | HEART RATE: 92 BPM | BODY MASS INDEX: 44.64 KG/M2 | DIASTOLIC BLOOD PRESSURE: 101 MMHG | HEIGHT: 66 IN | WEIGHT: 277.78 LBS | TEMPERATURE: 98.9 F | SYSTOLIC BLOOD PRESSURE: 158 MMHG

## 2020-03-09 DIAGNOSIS — I10 ELEVATED BLOOD PRESSURE READING WITH DIAGNOSIS OF HYPERTENSION: ICD-10-CM

## 2020-03-09 DIAGNOSIS — L72.9 SCALP CYST: Primary | ICD-10-CM

## 2020-03-09 PROCEDURE — 74011250637 HC RX REV CODE- 250/637: Performed by: PHYSICIAN ASSISTANT

## 2020-03-09 PROCEDURE — 99283 EMERGENCY DEPT VISIT LOW MDM: CPT

## 2020-03-09 RX ORDER — CEPHALEXIN 500 MG/1
500 CAPSULE ORAL 4 TIMES DAILY
Qty: 28 CAP | Refills: 0 | Status: SHIPPED | OUTPATIENT
Start: 2020-03-09 | End: 2020-03-09

## 2020-03-09 RX ORDER — SULFAMETHOXAZOLE AND TRIMETHOPRIM 800; 160 MG/1; MG/1
1 TABLET ORAL
Status: COMPLETED | OUTPATIENT
Start: 2020-03-09 | End: 2020-03-09

## 2020-03-09 RX ORDER — CEPHALEXIN 250 MG/1
500 CAPSULE ORAL
Status: COMPLETED | OUTPATIENT
Start: 2020-03-09 | End: 2020-03-09

## 2020-03-09 RX ORDER — NAPROXEN 500 MG/1
500 TABLET ORAL 2 TIMES DAILY WITH MEALS
Qty: 10 TAB | Refills: 0 | Status: SHIPPED | OUTPATIENT
Start: 2020-03-09 | End: 2020-05-18

## 2020-03-09 RX ORDER — SULFAMETHOXAZOLE AND TRIMETHOPRIM 800; 160 MG/1; MG/1
1 TABLET ORAL 2 TIMES DAILY
Qty: 14 TAB | Refills: 0 | Status: SHIPPED | OUTPATIENT
Start: 2020-03-09 | End: 2020-03-09

## 2020-03-09 RX ORDER — SULFAMETHOXAZOLE AND TRIMETHOPRIM 800; 160 MG/1; MG/1
1 TABLET ORAL 2 TIMES DAILY
Qty: 14 TAB | Refills: 0 | Status: SHIPPED | OUTPATIENT
Start: 2020-03-09 | End: 2020-03-16

## 2020-03-09 RX ORDER — CEPHALEXIN 500 MG/1
500 CAPSULE ORAL 4 TIMES DAILY
Qty: 28 CAP | Refills: 0 | Status: SHIPPED | OUTPATIENT
Start: 2020-03-09 | End: 2020-03-16

## 2020-03-09 RX ORDER — NAPROXEN 500 MG/1
500 TABLET ORAL 2 TIMES DAILY WITH MEALS
Qty: 10 TAB | Refills: 0 | Status: SHIPPED | OUTPATIENT
Start: 2020-03-09 | End: 2020-03-09

## 2020-03-09 RX ADMIN — IBUPROFEN 600 MG: 200 TABLET, FILM COATED ORAL at 23:37

## 2020-03-09 RX ADMIN — CEPHALEXIN 500 MG: 250 CAPSULE ORAL at 23:37

## 2020-03-09 RX ADMIN — SULFAMETHOXAZOLE AND TRIMETHOPRIM 1 TABLET: 800; 160 TABLET ORAL at 23:37

## 2020-03-10 NOTE — ED TRIAGE NOTES
Patient arrives to ED with complaints of a cyst located on the back of her neck, left side. Patient reports that this is beginning to cause her ear, neck and facial pain.

## 2020-03-10 NOTE — ED PROVIDER NOTES
EMERGENCY DEPARTMENT HISTORY AND PHYSICAL EXAM      Date: 3/9/2020  Patient Name: Wilman Zambrano    History of Presenting Illness     Chief Complaint   Patient presents with    Other     Cyst on back of neck, left side. History Provided By: Patient    HPI: Wilman Zambrano, 36 y.o. female with PMHx significant for patient presents emergency department for cyst formation on the back of her head. Patient states that she bases warm in the back of her head has had some pains with it as well she does state that she just had her hair braided and and is unsure with this while too tight. She denies any fevers, chills, bites of night sweats, chest pain, shortness of breath pus discharge or any other acute complications. She also denies any history of immunocompromising disease and tetanus is within 5 years. Please note for examination and HPI were completed I did introduce myself as the physician assistant. Please note that this dictation was completed with [employer], the computer voice recognition software. Quite often unanticipated grammatical, syntax, homophones, and other interpretive errors are inadvertently transcribed by the computer software. Please disregard these errors. Please excuse any errors that have escaped final proofreading. For further clarification on any chart please contact myself. Thank you. PCP: Unknown, Provider    No current facility-administered medications on file prior to encounter. Current Outpatient Medications on File Prior to Encounter   Medication Sig Dispense Refill    methocarbamol (ROBAXIN-750) 750 mg tablet Take 1 Tab by mouth four (4) times daily. 30 Tab 0    methylPREDNISolone (MEDROL, USAMA,) 4 mg tablet Dosepak taper 1 Dose Pack 0    [DISCONTINUED] ketorolac (TORADOL) 10 mg tablet Take 1 Tab by mouth every six (6) hours as needed for Pain. 20 Tab 0    topiramate (TOPAMAX) 50 mg tablet Take 50 mg by mouth daily.  HYDROCHLOROTHIAZIDE PO Take  by mouth.  ALPRAZOLAM PO Take  by mouth. Past History     Past Medical History:  Past Medical History:   Diagnosis Date    Arthritis     Asthma     bronchitis    Bronchitis     HTN, goal below 140/90 2012    Psychiatric disorder     bipolar       Past Surgical History:  Past Surgical History:   Procedure Laterality Date    DELIVERY       HX HEENT      4 wisdom teeth removed in 2011    HX TUBAL LIGATION      c section       Family History:  Family History   Problem Relation Age of Onset    Hypertension Father     Psychiatric Disorder Father         schzophrenia    Asthma Son     Asthma Son     Asthma Daughter        Social History:  Social History     Tobacco Use    Smoking status: Current Every Day Smoker     Packs/day: 1.00     Years: 12.00     Pack years: 12.00    Smokeless tobacco: Never Used   Substance Use Topics    Alcohol use: No    Drug use: No       Allergies: Allergies   Allergen Reactions    Codeine Nausea and Vomiting     Pt denies this allergy    Guaifenesin Nausea and Vomiting    Tessalon [Benzonatate] Other (comments)     Sneezing    Tramadol Rash         Review of Systems   Review of Systems   Skin: Positive for wound. All other systems reviewed and are negative. Physical Exam   Physical Exam  Vitals signs and nursing note reviewed. Constitutional:       Appearance: She is well-developed. HENT:      Head: Normocephalic and atraumatic. Eyes:      Conjunctiva/sclera: Conjunctivae normal.      Pupils: Pupils are equal, round, and reactive to light. Neck:      Musculoskeletal: Normal range of motion and neck supple. Thyroid: No thyromegaly. Cardiovascular:      Rate and Rhythm: Normal rate and regular rhythm. Heart sounds: Normal heart sounds. No murmur. Pulmonary:      Effort: Pulmonary effort is normal. No respiratory distress. Breath sounds: Normal breath sounds. No stridor. No wheezing. Musculoskeletal: Normal range of motion. General: No tenderness. Lymphadenopathy:      Cervical: No cervical adenopathy. Skin:     General: Skin is warm. Comments: Tenderness to palpation in the occipital region with a nodule slightly fluctuant however there is no santos appearance there is some surrounding it questionable cyst versus occipital lymph node. Is more tender with movements and rotation of the neck left and right. Cervical lymphadenopathy appreciated. Neurological:      Mental Status: She is alert and oriented to person, place, and time. Deep Tendon Reflexes: Reflexes are normal and symmetric. Psychiatric:         Judgment: Judgment normal.         Diagnostic Study Results     Labs -   No results found for this or any previous visit (from the past 12 hour(s)). Radiologic Studies -   No orders to display     CT Results  (Last 48 hours)    None        CXR Results  (Last 48 hours)    None            Medical Decision Making   I am the first provider for this patient. I reviewed the vital signs, available nursing notes, past medical history, past surgical history, family history and social history. Vital Signs-Reviewed the patient's vital signs. Patient Vitals for the past 12 hrs:   Temp Pulse Resp BP SpO2   03/09/20 2320 98.9 °F (37.2 °C) 92 18 (!) 158/101 99 %       Records Reviewed: Nursing Notes    Provider Notes (Medical Decision Making): At this time patient does have cyst formation and I did offer patient explaining risks and benefits she stated that she would rather do oral antibiotic therapy first and warm compresses to the area and revisit in approximately 2 to 4 days for wound check and evaluation. I did advise if the wound does get small enough that she does not need to return for repeat evaluation and at this time she will be discharged in stable condition to follow-up on outpatient basis.   She does have history of hypertension however denies any chest pain shortness of breath and states that due to the pain she thinks that her blood pressure is elevated however it is normally on the high side and she is supposed to see her primary care evaluation of her hypertension. ED Course:   Initial assessment performed. The patients presenting problems have been discussed, and they are in agreement with the care plan formulated and outlined with them. I have encouraged them to ask questions as they arise throughout their visit. Critical Care Time: None    Disposition:  Dispo     PLAN:  1. Discharge Medication List as of 3/9/2020 11:43 PM      START taking these medications    Details   naproxen (NAPROSYN) 500 mg tablet Take 1 Tab by mouth two (2) times daily (with meals). , Normal, Disp-10 Tab, R-0      trimethoprim-sulfamethoxazole (BACTRIM DS) 160-800 mg per tablet Take 1 Tab by mouth two (2) times a day for 7 days. , Normal, Disp-14 Tab, R-0      cephALEXin (KEFLEX) 500 mg capsule Take 1 Cap by mouth four (4) times daily for 7 days. , Normal, Disp-28 Cap, R-0         CONTINUE these medications which have NOT CHANGED    Details   methocarbamol (ROBAXIN-750) 750 mg tablet Take 1 Tab by mouth four (4) times daily. , Print, Disp-30 Tab, R-0      methylPREDNISolone (MEDROL, USAMA,) 4 mg tablet Dosepak taper, Print, Disp-1 Dose Pack, R-0      topiramate (TOPAMAX) 50 mg tablet Take 50 mg by mouth daily. , Historical Med      HYDROCHLOROTHIAZIDE PO Take  by mouth., Historical Med      ALPRAZOLAM PO Take  by mouth., Historical Med         STOP taking these medications       ketorolac (TORADOL) 10 mg tablet Comments:   Reason for Stoppin.   Follow-up Information     Follow up With Specialties Details Why Contact Info    [facility]   [address] [address]  [phone]    [facility] At [facility]    [address] [address] [phone]    [facility] EMERGENCY DEPT Emergency Medicine  For wound re-check [phone]  [phone]        Return to ED if worse     Diagnosis     Clinical Impression:   1. Scalp cyst    2. Elevated blood pressure reading with diagnosis of hypertension          Please note that this dictation was completed with [employer], the computer voice recognition software. Quite often unanticipated grammatical, syntax, homophones, and other interpretive errors are inadvertently transcribed by the computer software. Please disregards these errors. Please excuse any errors that have escaped final proofreading. This note will not be viewable in [address]

## 2020-03-10 NOTE — ED NOTES
Patient discharged by [place], [place]. Patient provided with discharge instructions Rx and instructions on follow up care. Patient out of ED ambulatory accompanied by self.

## 2020-03-17 ENCOUNTER — APPOINTMENT (OUTPATIENT)
Dept: GENERAL RADIOLOGY | Age: 40
End: 2020-03-17
Attending: PHYSICIAN ASSISTANT
Payer: MEDICAID

## 2020-03-17 ENCOUNTER — HOSPITAL ENCOUNTER (EMERGENCY)
Age: 40
Discharge: HOME | End: 2020-03-17
Attending: EMERGENCY MEDICINE
Payer: MEDICAID

## 2020-03-17 VITALS
BODY MASS INDEX: 44.86 KG/M2 | HEART RATE: 83 BPM | SYSTOLIC BLOOD PRESSURE: 142 MMHG | HEIGHT: 66 IN | RESPIRATION RATE: 18 BRPM | OXYGEN SATURATION: 100 % | WEIGHT: 279.1 LBS | DIASTOLIC BLOOD PRESSURE: 92 MMHG | TEMPERATURE: 98.2 F

## 2020-03-17 DIAGNOSIS — J42 CHRONIC BRONCHITIS, UNSPECIFIED CHRONIC BRONCHITIS TYPE (HCC): Primary | ICD-10-CM

## 2020-03-17 DIAGNOSIS — J20.9 ACUTE BRONCHITIS, UNSPECIFIED ORGANISM: ICD-10-CM

## 2020-03-17 LAB
FLUAV AG NPH QL IA: NEGATIVE
FLUBV AG NOSE QL IA: NEGATIVE

## 2020-03-17 PROCEDURE — 71045 X-RAY EXAM CHEST 1 VIEW: CPT

## 2020-03-17 PROCEDURE — 99282 EMERGENCY DEPT VISIT SF MDM: CPT

## 2020-03-17 PROCEDURE — 87804 INFLUENZA ASSAY W/OPTIC: CPT

## 2020-03-17 RX ORDER — HYDROCODONE POLISTIREX AND CHLORPHENIRAMINE POLISTIREX 10; 8 MG/5ML; MG/5ML
5 SUSPENSION, EXTENDED RELEASE ORAL
Qty: 30 ML | Refills: 0 | Status: SHIPPED | OUTPATIENT
Start: 2020-03-17 | End: 2020-03-20

## 2020-03-17 RX ORDER — HYDROCODONE POLISTIREX AND CHLORPHENIRAMINE POLISTIREX 10; 8 MG/5ML; MG/5ML
5 SUSPENSION, EXTENDED RELEASE ORAL
Qty: 30 ML | Refills: 0 | Status: SHIPPED | OUTPATIENT
Start: 2020-03-17 | End: 2020-03-17

## 2020-03-17 RX ORDER — PREDNISONE 20 MG/1
40 TABLET ORAL DAILY
Qty: 10 TAB | Refills: 0 | Status: SHIPPED | OUTPATIENT
Start: 2020-03-17 | End: 2020-03-17

## 2020-03-17 RX ORDER — PREDNISONE 20 MG/1
40 TABLET ORAL DAILY
Qty: 10 TAB | Refills: 0 | Status: SHIPPED | OUTPATIENT
Start: 2020-03-17 | End: 2020-03-22

## 2020-03-17 NOTE — ED PROVIDER NOTES
EMERGENCY DEPARTMENT HISTORY AND PHYSICAL EXAM      Date: 3/17/2020  Patient Name: Sabrina Tellez    History of Presenting Illness     Chief Complaint   Patient presents with    Cough     dry cough x 2-3 days with body aches       History Provided By: Patient    HPI: Sabrina Tellez, 36 y.o. smoking female with PMHx significant for asthma, recurrent bronchitis, HTN presents BIB self to the ED with cc of fatigue x3 days and dry wheezy cough x1 week. Patient denies fevers, nasal congestion, sore throat, hemoptysis, sputum production, chest pain, dyspnea, nausea, vomiting, abdominal pain, rashes. Patient requests an albuterol refill. There are no other complaints, changes, or physical findings at this time. PCP: Unknown, Provider    No current facility-administered medications on file prior to encounter. Current Outpatient Medications on File Prior to Encounter   Medication Sig Dispense Refill    naproxen (NAPROSYN) 500 mg tablet Take 1 Tab by mouth two (2) times daily (with meals). 10 Tab 0    [] trimethoprim-sulfamethoxazole (BACTRIM DS) 160-800 mg per tablet Take 1 Tab by mouth two (2) times a day for 7 days. 14 Tab 0    [] cephALEXin (KEFLEX) 500 mg capsule Take 1 Cap by mouth four (4) times daily for 7 days. 28 Cap 0    methocarbamol (ROBAXIN-750) 750 mg tablet Take 1 Tab by mouth four (4) times daily. 30 Tab 0    methylPREDNISolone (MEDROL, USAMA,) 4 mg tablet Dosepak taper 1 Dose Pack 0    topiramate (TOPAMAX) 50 mg tablet Take 50 mg by mouth daily.  HYDROCHLOROTHIAZIDE PO Take  by mouth.  ALPRAZOLAM PO Take  by mouth.          Past History     Past Medical History:  Past Medical History:   Diagnosis Date    Arthritis     Asthma     bronchitis    Bronchitis     HTN, goal below 140/90 2012    Psychiatric disorder     bipolar       Past Surgical History:  Past Surgical History:   Procedure Laterality Date    DELIVERY       HX HEENT      4 wisdom teeth removed in 1/2011    HX TUBAL LIGATION      c section       Family History:  Family History   Problem Relation Age of Onset    Hypertension Father     Psychiatric Disorder Father         schzophrenia    Asthma Son     Asthma Son     Asthma Daughter        Social History:  Social History     Tobacco Use    Smoking status: Current Every Day Smoker     Packs/day: 1.00     Years: 12.00     Pack years: 12.00    Smokeless tobacco: Never Used   Substance Use Topics    Alcohol use: No    Drug use: No       Allergies: Allergies   Allergen Reactions    Codeine Nausea and Vomiting     Pt denies this allergy    Guaifenesin Nausea and Vomiting    Tessalon [Benzonatate] Other (comments)     Sneezing    Tramadol Rash         Review of Systems   Review of Systems   Constitutional: Positive for fatigue. Negative for fever. HENT: Negative for congestion and sore throat. Eyes: Negative for redness. Respiratory: Positive for cough. Negative for shortness of breath. Cardiovascular: Negative for chest pain. Physical Exam   Physical Exam  Vitals signs and nursing note reviewed. Constitutional:       General: She is not in acute distress. Appearance: Normal appearance. She is well-developed. She is not toxic-appearing. HENT:      Head: Normocephalic and atraumatic. Right Ear: Tympanic membrane normal.      Left Ear: Tympanic membrane normal.      Nose: Nose normal.      Mouth/Throat:      Mouth: Mucous membranes are moist.   Eyes:      General: Lids are normal.      Extraocular Movements: Extraocular movements intact. Conjunctiva/sclera: Conjunctivae normal.      Pupils: Pupils are equal, round, and reactive to light. Neck:      Musculoskeletal: Normal range of motion and neck supple. Cardiovascular:      Rate and Rhythm: Normal rate and regular rhythm. Pulmonary:      Effort: Pulmonary effort is normal.      Breath sounds: No stridor.  Rhonchi (Trace, inspiratory and expiratory, bilateral) present. No wheezing or rales. Chest:      Chest wall: No tenderness. Abdominal:      Palpations: Abdomen is soft. Tenderness: There is no abdominal tenderness. There is no guarding. Musculoskeletal: Normal range of motion. Comments: No peripheral edema or leg tenderness. Skin:     General: Skin is warm and dry. Neurological:      General: No focal deficit present. Mental Status: She is alert and oriented to person, place, and time. Psychiatric:         Mood and Affect: Mood normal.         Behavior: Behavior normal. Behavior is cooperative. Diagnostic Study Results     Labs -     Recent Results (from the past 12 hour(s))   INFLUENZA A+B VIRAL AGS    Collection Time: 03/17/20  1:35 PM   Result Value Ref Range    Influenza A Antigen NEGATIVE  NEG      Influenza B Antigen NEGATIVE  NEG         Radiologic Studies -   XR CHEST PORT   Final Result   Impression:   1. No acute disease           CT Results  (Last 48 hours)    None        CXR Results  (Last 48 hours)               03/17/20 1402  XR CHEST PORT Final result    Impression:  Impression:   1. No acute disease           Narrative:  INDICATION:  cough        Exam: Portable chest 1355. Comparison: 1/16/2018. Findings: Cardiomediastinal silhouette is within normal limits. Pulmonary   vasculature is not engorged. There are no focal parenchymal opacities,   effusions, or pneumothorax. Medical Decision Making   I am the first provider for this patient. I reviewed the vital signs, available nursing notes, past medical history, past surgical history, family history and social history. Vital Signs-Reviewed the patient's vital signs.   Patient Vitals for the past 12 hrs:   Temp Pulse Resp BP SpO2   03/17/20 1314 98.2 °F (36.8 °C) 83 18 (!) 142/92 100 %       Records Reviewed: Nursing Notes and Old Medical Records    Provider Notes (Medical Decision Making):   Patient is well-appearing and in no acute distress. She is satting at high percent room air. Lungs with trace rhonchi bilaterally, no crackles or wheezing. Chest x-ray reassuring. Suspect this is related to patient's chronic bronchitis. Encourage smoking cessation. Patient is agreeable to course of prednisone, refill of albuterol MDI. Follow-up with PCP. ED return precautions given. ED Course:   Initial assessment performed. The patients presenting problems have been discussed, and they are in agreement with the care plan formulated and outlined with them. I have encouraged them to ask questions as they arise throughout their visit. Critical Care Time: None    Disposition:  D/c    PLAN:  1. Current Discharge Medication List      START taking these medications    Details   predniSONE (DELTASONE) 20 mg tablet Take 40 mg by mouth daily for 5 days. With Breakfast  Qty: 10 Tab, Refills: 0      albuterol sulfate 90 mcg/actuation aepb Take 1 Puff by inhalation every four (4) hours as needed for Wheezing. Qty: 1 Inhaler, Refills: 0           2. Follow-up Information     Follow up With Specialties Details Why Contact Info    [facility] EMERGENCY DEPT Emergency Medicine  As needed, If symptoms worsen [address]  [address] [facility]  [phone]    Your PCP   Call For follow up         Return to ED if worse     Diagnosis     Clinical Impression:   1. Chronic bronchitis, unspecified chronic bronchitis type ([address]          Please note that this dictation was completed with [employer], the computer voice recognition software. Quite often unanticipated grammatical, syntax, homophones, and other interpretive errors are inadvertently transcribed by the computer software. Please disregards these errors. Please excuse any errors that have escaped final proofreading. This note will not be viewable in [address]

## 2020-03-17 NOTE — DISCHARGE INSTRUCTIONS
Patient Education        Bronchitis: Care Instructions  Your Care Instructions    Bronchitis is inflammation of the bronchial tubes, which carry air to the lungs. The tubes swell and produce mucus, or phlegm. The mucus and inflamed bronchial tubes make you cough. You may have trouble breathing. Most cases of bronchitis are caused by viruses like those that cause colds. Antibiotics usually do not help and they may be harmful. Bronchitis usually develops rapidly and lasts about 2 to 3 weeks in otherwise healthy people. Follow-up care is a key part of your treatment and safety. Be sure to make and go to all appointments, and call your doctor if you are having problems. It's also a good idea to know your test results and keep a list of the medicines you take. How can you care for yourself at home? · Take all medicines exactly as prescribed. Call your doctor if you think you are having a problem with your medicine. · Get some extra rest.  · Take an over-the-counter pain medicine, such as acetaminophen (Tylenol), ibuprofen (Advil, Motrin), or naproxen (Aleve) to reduce fever and relieve body aches. Read and follow all instructions on the label. · Do not take two or more pain medicines at the same time unless the doctor told you to. Many pain medicines have acetaminophen, which is Tylenol. Too much acetaminophen (Tylenol) can be harmful. · Take an over-the-counter cough medicine that contains dextromethorphan to help quiet a dry, hacking cough so that you can sleep. Avoid cough medicines that have more than one active ingredient. Read and follow all instructions on the label. · Breathe moist air from a humidifier, hot shower, or sink filled with hot water. The heat and moisture will thin mucus so you can cough it out. · Do not smoke. Smoking can make bronchitis worse. If you need help quitting, talk to your doctor about stop-smoking programs and medicines.  These can increase your chances of quitting for good.  When should you call for help? Call 911 anytime you think you may need emergency care. For example, call if:    · You have severe trouble breathing.    Call your doctor now or seek immediate medical care if:    · You have new or worse trouble breathing.     · You cough up dark brown or bloody mucus (sputum).     · You have a new or higher fever.     · You have a new rash.    Watch closely for changes in your health, and be sure to contact your doctor if:    · You cough more deeply or more often, especially if you notice more mucus or a change in the color of your mucus.     · You are not getting better as expected. Where can you learn more? Go to http://joao-ladi.info/  Enter H333 in the search box to learn more about \"Bronchitis: Care Instructions. \"  Current as of: June 9, 2019Content Version: 12.4  © 1085-7614 [employer]. Care instructions adapted under license by [employer] (which disclaims liability or warranty for this information). If you have questions about a medical condition or this instruction, always ask your healthcare professional. [address] any warranty or liability for your use of this information.

## 2020-03-19 ENCOUNTER — PATIENT OUTREACH (OUTPATIENT)
Dept: CARDIOLOGY CLINIC | Age: 40
End: 2020-03-19

## 2020-03-19 NOTE — PROGRESS NOTES
COVID-19 Screening Initial Follow-up Note    Patient contacted regarding COVID-19  risk. Care Transition Nurse/ Ambulatory Care Manager contacted the patient by telephone to perform post discharge assessment. Verified name and  with patient as identifiers. Provided introduction to self, and explanation of the CTN/ACM role, and reason for call due to risk factors for infection and/or exposure to COVID-19. Symptoms reviewed with patient who verbalized the following symptoms:   Fever no    Fatigue no   Pain or aching joints no  Cough no  Shortness of breath no    Confusion or unusual change in mental status no    Chills or shaking no    Sweating no    Fast heart rate no    Fast breathing no    Dizziness/lightheadedness no    Less urine output no    Cold, clammy, and pale skin no  Low body temperature no       Due to onset/worsening of new symptoms, encounter routed to provider for escalation. Patient has following risk factors of: asthma and ED visit. CTN/ACM reviewed discharge instructions, medical action plan and red flags such as increased shortness of breath, increasing fever and signs of decompensation with patient who verbalized understanding. Discussed exposure protocols and quarantine with CDC Guidelines What to do if you are sick with coronavirus disease 2019 Patient who was given an opportunity for questions and concerns. The patient agrees to contact the [facility] exposure line, local health department and PCP office for questions related to their healthcare. CTN provided contact information for future reference. Reviewed and educated patient on any new and changed medications related to discharge diagnosis    Pt reports not symptoms. I provided her with flu clinic sites. We discussed handwashing and social distancing. Pt states understanding. Pt states appreciation for the call and states that I may call her back in 14 days.      Plan for follow-up call in 14 days based on severity of symptoms and risk factors

## 2020-03-23 ENCOUNTER — APPOINTMENT (OUTPATIENT)
Dept: GENERAL RADIOLOGY | Age: 40
End: 2020-03-23
Attending: EMERGENCY MEDICINE
Payer: MEDICAID

## 2020-03-23 ENCOUNTER — PATIENT OUTREACH (OUTPATIENT)
Dept: CARDIOLOGY CLINIC | Age: 40
End: 2020-03-23

## 2020-03-23 ENCOUNTER — HOSPITAL ENCOUNTER (EMERGENCY)
Age: 40
Discharge: HOME | End: 2020-03-23
Attending: EMERGENCY MEDICINE
Payer: MEDICAID

## 2020-03-23 VITALS
DIASTOLIC BLOOD PRESSURE: 75 MMHG | OXYGEN SATURATION: 98 % | HEART RATE: 63 BPM | RESPIRATION RATE: 18 BRPM | HEIGHT: 66 IN | BODY MASS INDEX: 45.74 KG/M2 | SYSTOLIC BLOOD PRESSURE: 107 MMHG | WEIGHT: 284.61 LBS

## 2020-03-23 DIAGNOSIS — R07.89 ATYPICAL CHEST PAIN: Primary | ICD-10-CM

## 2020-03-23 DIAGNOSIS — Z72.0 TOBACCO ABUSE: ICD-10-CM

## 2020-03-23 LAB
ALBUMIN SERPL-MCNC: 2.9 G/DL (ref 3.5–5)
ALBUMIN/GLOB SERPL: 1 {RATIO} (ref 1.1–2.2)
ALP SERPL-CCNC: 65 U/L (ref 45–117)
ALT SERPL-CCNC: 24 U/L (ref 12–78)
ANION GAP SERPL CALC-SCNC: 7 MMOL/L (ref 5–15)
APPEARANCE UR: ABNORMAL
AST SERPL-CCNC: 15 U/L (ref 15–37)
ATRIAL RATE: 83 BPM
BACTERIA URNS QL MICRO: ABNORMAL /HPF
BASOPHILS # BLD: 0.1 K/UL (ref 0–0.1)
BASOPHILS NFR BLD: 1 % (ref 0–1)
BILIRUB SERPL-MCNC: 0.2 MG/DL (ref 0.2–1)
BILIRUB UR QL: NEGATIVE
BUN SERPL-MCNC: 8 MG/DL (ref 6–20)
BUN/CREAT SERPL: 9 (ref 12–20)
CALCIUM SERPL-MCNC: 7.6 MG/DL (ref 8.5–10.1)
CALCULATED P AXIS, ECG09: 55 DEGREES
CALCULATED R AXIS, ECG10: -10 DEGREES
CALCULATED T AXIS, ECG11: -3 DEGREES
CHLORIDE SERPL-SCNC: 110 MMOL/L (ref 97–108)
CO2 SERPL-SCNC: 25 MMOL/L (ref 21–32)
COLOR UR: ABNORMAL
CREAT SERPL-MCNC: 0.93 MG/DL (ref 0.55–1.02)
DIAGNOSIS, 93000: NORMAL
DIFFERENTIAL METHOD BLD: ABNORMAL
EOSINOPHIL # BLD: 0.1 K/UL (ref 0–0.4)
EOSINOPHIL NFR BLD: 2 % (ref 0–7)
EPITH CASTS URNS QL MICRO: ABNORMAL /LPF
ERYTHROCYTE [DISTWIDTH] IN BLOOD BY AUTOMATED COUNT: 13.3 % (ref 11.5–14.5)
GLOBULIN SER CALC-MCNC: 3 G/DL (ref 2–4)
GLUCOSE SERPL-MCNC: 168 MG/DL (ref 65–100)
GLUCOSE UR STRIP.AUTO-MCNC: NEGATIVE MG/DL
HCT VFR BLD AUTO: 40.6 % (ref 35–47)
HGB BLD-MCNC: 13.2 G/DL (ref 11.5–16)
HGB UR QL STRIP: NEGATIVE
IMM GRANULOCYTES # BLD AUTO: 0 K/UL (ref 0–0.04)
IMM GRANULOCYTES NFR BLD AUTO: 1 % (ref 0–0.5)
KETONES UR QL STRIP.AUTO: NEGATIVE MG/DL
LEUKOCYTE ESTERASE UR QL STRIP.AUTO: NEGATIVE
LYMPHOCYTES # BLD: 3 K/UL (ref 0.8–3.5)
LYMPHOCYTES NFR BLD: 37 % (ref 12–49)
MCH RBC QN AUTO: 30 PG (ref 26–34)
MCHC RBC AUTO-ENTMCNC: 32.5 G/DL (ref 30–36.5)
MCV RBC AUTO: 92.3 FL (ref 80–99)
MONOCYTES # BLD: 0.4 K/UL (ref 0–1)
MONOCYTES NFR BLD: 5 % (ref 5–13)
NEUTS SEG # BLD: 4.5 K/UL (ref 1.8–8)
NEUTS SEG NFR BLD: 54 % (ref 32–75)
NITRITE UR QL STRIP.AUTO: NEGATIVE
NRBC # BLD: 0 K/UL (ref 0–0.01)
NRBC BLD-RTO: 0 PER 100 WBC
P-R INTERVAL, ECG05: 146 MS
PH UR STRIP: 5.5 [PH] (ref 5–8)
PLATELET # BLD AUTO: 271 K/UL (ref 150–400)
PMV BLD AUTO: 9.2 FL (ref 8.9–12.9)
POTASSIUM SERPL-SCNC: 3.7 MMOL/L (ref 3.5–5.1)
PROT SERPL-MCNC: 5.9 G/DL (ref 6.4–8.2)
PROT UR STRIP-MCNC: NEGATIVE MG/DL
Q-T INTERVAL, ECG07: 398 MS
QRS DURATION, ECG06: 82 MS
QTC CALCULATION (BEZET), ECG08: 467 MS
RBC # BLD AUTO: 4.4 M/UL (ref 3.8–5.2)
RBC #/AREA URNS HPF: ABNORMAL /HPF (ref 0–5)
SODIUM SERPL-SCNC: 142 MMOL/L (ref 136–145)
SP GR UR REFRACTOMETRY: 1.02 (ref 1–1.03)
TROPONIN I SERPL-MCNC: <0.05 NG/ML
UA: UC IF INDICATED,UAUC: ABNORMAL
UROBILINOGEN UR QL STRIP.AUTO: 0.2 EU/DL (ref 0.2–1)
VENTRICULAR RATE, ECG03: 83 BPM
WBC # BLD AUTO: 8.2 K/UL (ref 3.6–11)
WBC URNS QL MICRO: ABNORMAL /HPF (ref 0–4)

## 2020-03-23 PROCEDURE — 85025 COMPLETE CBC W/AUTO DIFF WBC: CPT

## 2020-03-23 PROCEDURE — 87086 URINE CULTURE/COLONY COUNT: CPT

## 2020-03-23 PROCEDURE — 99284 EMERGENCY DEPT VISIT MOD MDM: CPT

## 2020-03-23 PROCEDURE — 71045 X-RAY EXAM CHEST 1 VIEW: CPT

## 2020-03-23 PROCEDURE — 96374 THER/PROPH/DIAG INJ IV PUSH: CPT

## 2020-03-23 PROCEDURE — 93005 ELECTROCARDIOGRAM TRACING: CPT

## 2020-03-23 PROCEDURE — 84484 ASSAY OF TROPONIN QUANT: CPT

## 2020-03-23 PROCEDURE — 36415 COLL VENOUS BLD VENIPUNCTURE: CPT

## 2020-03-23 PROCEDURE — 74011250636 HC RX REV CODE- 250/636: Performed by: EMERGENCY MEDICINE

## 2020-03-23 PROCEDURE — 81001 URINALYSIS AUTO W/SCOPE: CPT

## 2020-03-23 PROCEDURE — 80053 COMPREHEN METABOLIC PANEL: CPT

## 2020-03-23 RX ORDER — KETOROLAC TROMETHAMINE 30 MG/ML
15 INJECTION, SOLUTION INTRAMUSCULAR; INTRAVENOUS ONCE
Status: COMPLETED | OUTPATIENT
Start: 2020-03-23 | End: 2020-03-23

## 2020-03-23 RX ADMIN — KETOROLAC TROMETHAMINE 15 MG: 30 INJECTION, SOLUTION INTRAMUSCULAR at 11:18

## 2020-03-23 NOTE — PROGRESS NOTES
Patient contacted regarding recent discharge and COVID-19 risk   Care Transition Nurse/ Ambulatory Care Manager contacted the patient by telephone to perform post discharge assessment. Verified name and  with patient as identifiers. Patient has following risk factors of: mulitple ED visits. CTN/ACM reviewed discharge instructions, medical action plan and red flags related to discharge diagnosis. Reviewed and educated them on any new and changed medications related to discharge diagnosis. Advised obtaining a 90-day supply of all daily and as-needed medications. Education provided regarding infection prevention, and signs and symptoms of COVID-19 and when to seek medical attention with patient who verbalized understanding. Discussed exposure protocols and quarantine from [address] you at higher risk for severe illness  and given an opportunity for questions and concerns. The patient agrees to contact the COVID-19 hotline [phone] or PCP office for questions related to their healthcare. CTN/ACM provided contact information for future reference. From CDC: Are you at higher risk for severe illness?  Wash your hands often.  Avoid close contact (6 feet, which is about two arm lengths) with people who are sick.  Put distance between yourself and other people if COVID-19 is spreading in your community.  Clean and disinfect frequently touched surfaces.  Avoid all cruise travel and non-essential air travel.  Call your healthcare professional if you have concerns about COVID-19 and your underlying condition or if you are sick. For more information on steps you can take to protect yourself, see CDC's How to Protect Yourself     Pt reports social distancing and handwashing and was afraid that she might be having a heart attack. Pt reminded that she can call PCP. Will continue to follow.

## 2020-03-23 NOTE — LETTER
NOTIFICATION RETURN TO WORK / SCHOOL 
 
3/23/2020 11:57 AM 
 
Ms. Wilman Zambrano [phone] To Whom It May Concern: 
 
Wilman Zambrano is currently under the care of [facility] EMERGENCY DEPT. She will return to work/school on: 3/24/2020 If there are questions or concerns please have the patient contact our office.  
 
 
 
Sincerely,

## 2020-03-23 NOTE — ED PROVIDER NOTES
EMERGENCY DEPARTMENT HISTORY AND PHYSICAL EXAM      Date: 3/23/2020  Patient Name: Bo Hodge  Patient Age and Sex: 36 y.o. female    History of Presenting Illness     Chief Complaint   Patient presents with    Chest Pain       History Provided By: Patient    Ability to gather history was limited by:     HPI: Bo Hodge, 36 y.o. female with history of obesity, smoking, bipolar, frequent ED visits for a variety of different complaints complains of left-sided chest pain for the past 4 to 5 days, also with dry cough. No fevers, no sputum. No shortness of breath. Pain is aching and burning in nature, mild to moderate intensity, limited to the left side of the chest, nonradiating. Pain is worsened by smoking, which she has cut down on over the past week. No prior similar chest pains. No history of CAD. Location:    Quality:      Severity:    Duration:   Timing:      Context:    Modifying factors:   Associated symptoms:       The patient's medical, surgical, family, and social history on file were reviewed by me today.       Past Medical History:   Diagnosis Date    Arthritis     Asthma     bronchitis    Bronchitis     HTN, goal below 140/90 2012    Psychiatric disorder     bipolar     Past Surgical History:   Procedure Laterality Date    DELIVERY       HX HEENT      4 wisdom teeth removed in 2011    HX TUBAL LIGATION      c section       PCP: Unknown, Provider    Past History     Past Medical History:  Past Medical History:   Diagnosis Date    Arthritis     Asthma     bronchitis    Bronchitis     HTN, goal below 140/90 2012    Psychiatric disorder     bipolar       Past Surgical History:  Past Surgical History:   Procedure Laterality Date    DELIVERY       HX HEENT      4 wisdom teeth removed in 2011    HX TUBAL LIGATION      c section       Family History:  Family History   Problem Relation Age of Onset    Hypertension Father     Psychiatric Disorder Father         schzophrenia    Asthma Son     Asthma Son     Asthma Daughter        Social History:  Social History     Tobacco Use    Smoking status: Current Every Day Smoker     Packs/day: 1.00     Years: 12.00     Pack years: 12.00    Smokeless tobacco: Never Used   Substance Use Topics    Alcohol use: No    Drug use: No       Allergies: Allergies   Allergen Reactions    Codeine Nausea and Vomiting     Pt denies this allergy    Guaifenesin Nausea and Vomiting    Tessalon [Benzonatate] Other (comments)     Sneezing    Tramadol Rash       Current Medications:  No current facility-administered medications on file prior to encounter. Current Outpatient Medications on File Prior to Encounter   Medication Sig Dispense Refill    [] predniSONE (DELTASONE) 20 mg tablet Take 40 mg by mouth daily for 5 days. With Breakfast 10 Tab 0    albuterol sulfate 90 mcg/actuation aepb Take 1 Puff by inhalation every four (4) hours as needed for Wheezing. 1 Inhaler 0    naproxen (NAPROSYN) 500 mg tablet Take 1 Tab by mouth two (2) times daily (with meals). 10 Tab 0    methocarbamol (ROBAXIN-750) 750 mg tablet Take 1 Tab by mouth four (4) times daily. 30 Tab 0    methylPREDNISolone (MEDROL, USAMA,) 4 mg tablet Dosepak taper 1 Dose Pack 0    topiramate (TOPAMAX) 50 mg tablet Take 50 mg by mouth daily.  HYDROCHLOROTHIAZIDE PO Take  by mouth.  ALPRAZOLAM PO Take  by mouth. Review of Systems   Review of Systems   Constitutional: Negative for fatigue and fever. Respiratory: Positive for cough. Negative for shortness of breath. Cardiovascular: Positive for chest pain. All other systems reviewed and are negative. Physical Exam   Vital Signs  No data found. Physical Exam  Vitals signs and nursing note reviewed. Constitutional:       General: She is not in acute distress. Appearance: She is well-developed. HENT:      Head: Normocephalic and atraumatic.       Mouth/Throat: Mouth: Mucous membranes are moist.   Eyes:      General:         Right eye: No discharge. Left eye: No discharge. Conjunctiva/sclera: Conjunctivae normal.   Neck:      Musculoskeletal: Normal range of motion and neck supple. Cardiovascular:      Rate and Rhythm: Normal rate and regular rhythm. Heart sounds: Normal heart sounds. No murmur. Pulmonary:      Effort: Pulmonary effort is normal. No respiratory distress. Breath sounds: Normal breath sounds. No wheezing. Abdominal:      General: There is no distension. Palpations: Abdomen is soft. Tenderness: There is no abdominal tenderness. Musculoskeletal: Normal range of motion. General: No deformity. Skin:     General: Skin is warm and dry. Findings: No rash. Neurological:      Mental Status: She is alert and oriented to person, place, and time. Psychiatric:         Behavior: Behavior normal.         Thought Content:  Thought content normal.         Diagnostic Study Results   Labs  Recent Results (from the past 24 hour(s))   EKG, 12 LEAD, INITIAL    Collection Time: 03/23/20 10:09 AM   Result Value Ref Range    Ventricular Rate 83 BPM    Atrial Rate 83 BPM    P-R Interval 146 ms    QRS Duration 82 ms    Q-T Interval 398 ms    QTC Calculation (Bezet) 467 ms    Calculated P Axis 55 degrees    Calculated R Axis -10 degrees    Calculated T Axis -3 degrees    Diagnosis       Normal sinus rhythm  Nonspecific ST and T wave abnormality  When compared with ECG of 16-JAN-2018 17:44,  Nonspecific T wave abnormality now evident in Anterolateral leads  QT has lengthened  Confirmed by Bladimir Douglass ([id number]) on 3/23/2020 10:38:47 AM     CBC WITH AUTOMATED DIFF    Collection Time: 03/23/20 10:21 AM   Result Value Ref Range    WBC 8.2 3.6 - 11.0 K/uL    RBC 4.40 3.80 - 5.20 M/uL    HGB 13.2 11.5 - 16.0 g/dL    HCT 40.6 35.0 - 47.0 %    MCV 92.3 80.0 - 99.0 FL    MCH 30.0 26.0 - 34.0 PG    MCHC 32.5 30.0 - 36.5 g/dL    RDW 13.3 11.5 - 14.5 %    PLATELET 123 163 - 159 K/uL    MPV 9.2 8.9 - 12.9 FL    NRBC 0.0 0  WBC    ABSOLUTE NRBC 0.00 0.00 - 0.01 K/uL    NEUTROPHILS 54 32 - 75 %    LYMPHOCYTES 37 12 - 49 %    MONOCYTES 5 5 - 13 %    EOSINOPHILS 2 0 - 7 %    BASOPHILS 1 0 - 1 %    IMMATURE GRANULOCYTES 1 (H) 0.0 - 0.5 %    ABS. NEUTROPHILS 4.5 1.8 - 8.0 K/UL    ABS. LYMPHOCYTES 3.0 0.8 - 3.5 K/UL    ABS. MONOCYTES 0.4 0.0 - 1.0 K/UL    ABS. EOSINOPHILS 0.1 0.0 - 0.4 K/UL    ABS. BASOPHILS 0.1 0.0 - 0.1 K/UL    ABS. IMM. GRANS. 0.0 0.00 - 0.04 K/UL    DF AUTOMATED     METABOLIC PANEL, COMPREHENSIVE    Collection Time: 03/23/20 10:21 AM   Result Value Ref Range    Sodium 142 136 - 145 mmol/L    Potassium 3.7 3.5 - 5.1 mmol/L    Chloride 110 (H) 97 - 108 mmol/L    CO2 25 21 - 32 mmol/L    Anion gap 7 5 - 15 mmol/L    Glucose 168 (H) 65 - 100 mg/dL    BUN 8 6 - 20 MG/DL    Creatinine 0.93 0.55 - 1.02 MG/DL    BUN/Creatinine ratio 9 (L) 12 - 20      GFR est AA >60 >60 ml/min/1.73m2    GFR est non-AA >60 >60 ml/min/1.73m2    Calcium 7.6 (L) 8.5 - 10.1 MG/DL    Bilirubin, total 0.2 0.2 - 1.0 MG/DL    ALT (SGPT) 24 12 - 78 U/L    AST (SGOT) 15 15 - 37 U/L    Alk.  phosphatase 65 45 - 117 U/L    Protein, total 5.9 (L) 6.4 - 8.2 g/dL    Albumin 2.9 (L) 3.5 - 5.0 g/dL    Globulin 3.0 2.0 - 4.0 g/dL    A-G Ratio 1.0 (L) 1.1 - 2.2     TROPONIN I    Collection Time: 03/23/20 10:21 AM   Result Value Ref Range    Troponin-I, Qt. <0.05 <0.05 ng/mL   URINALYSIS W/ REFLEX CULTURE    Collection Time: 03/23/20 10:21 AM   Result Value Ref Range    Color YELLOW/STRAW      Appearance CLOUDY (A) CLEAR      Specific gravity 1.025 1.003 - 1.030      pH (UA) 5.5 5.0 - 8.0      Protein NEGATIVE  NEG mg/dL    Glucose NEGATIVE  NEG mg/dL    Ketone NEGATIVE  NEG mg/dL    Bilirubin NEGATIVE  NEG      Blood NEGATIVE  NEG      Urobilinogen 0.2 0.2 - 1.0 EU/dL    Nitrites NEGATIVE  NEG      Leukocyte Esterase NEGATIVE  NEG      WBC 0-4 0 - 4 /hpf    RBC 0-5 0 - 5 /hpf    Epithelial cells MANY (A) FEW /lpf    Bacteria 1+ (A) NEG /hpf    UA:UC IF INDICATED URINE CULTURE ORDERED (A) CNI         Radiologic Studies  XR CHEST SNGL V   Final Result   IMPRESSION: No evidence of active lung disease. CT Results  (Last 48 hours)    None        CXR Results  (Last 48 hours)               03/23/20 1024  XR CHEST SNGL V Final result    Impression:  IMPRESSION: No evidence of active lung disease. Narrative:  Chest single view dated 3/23/2020       Comparison chest dated 3/17/2020       History is chest pain       A single portable AP upright view of the chest was obtained. It is difficult to   accurately assess the size of the cardiac silhouette. There is no evidence of   active lung disease. The costophrenic angles are sharp in appearance. Procedures   EKG  Date/Time: 3/23/2020 11:48 AM  Performed by: Mandy Zuleta MD  Authorized by: Mandy Zuleta MD     ECG reviewed by ED Physician in the absence of a cardiologist: yes    Interpretation:     Interpretation: non-specific    Rate:     ECG rate assessment: normal    Rhythm:     Rhythm: sinus rhythm    Ectopy:     Ectopy: none    QRS:     QRS axis:  Normal  ST segments:     ST segments:  Normal  T waves:     T waves: normal          Medical Decision Making     Provider Notes (Medical Decision Making):   59-year-old female smoker complaining of chest pain for the last few days, mild dry cough, essentially no other symptoms. Clinically very well-appearing, no apparent distress or discomfort, no shortness of breath. Sitting calmly in bed. Normal vital signs. Normal EKG, nonischemic. Her lung sounds are clear. No significant clinical concern for ACS, MI, pneumonia, pneumothorax, PE, or aortic pathology. There is no evidence of any acute cardiopulmonary pathology at this time. Her symptoms are likely musculoskeletal and/or anxiety related.   No indication for further serial troponins given the time course and atypical presentation. Stable for discharge home. We had a discussion about smoking cessation including the reasons for smoking cessation and potential medical options. She expressed interest and will follow-up with PMD.    Martha Smith MD  11:46 AM      TOBACCO COUNSELING:  Upon evaluation, pt expressed that they are a current tobacco user. For approximately 5 minutes, I counseled the patient on the dangers of smoking. The patient was encouraged to quit as soon as possible in order to decrease further risks to their health. We discussed nicotine replacement therapies and medical treatment options to decrease cravings and improve chances of success quitting. Pt has conveyed their understanding of the risks involved should they continue to use tobacco products. Social History     Tobacco Use    Smoking status: Current Every Day Smoker     Packs/day: 1.00     Years: 12.00     Pack years: 12.00    Smokeless tobacco: Never Used   Substance Use Topics    Alcohol use: No    Drug use: No     No data found. Consults:      Medications Administered during ED course:  Medications   ketorolac (TORADOL) injection 15 mg (15 mg IntraVENous Given 3/23/20 1118)                Diagnosis and Disposition     Disposition:  Discharged    Clinical Impression:   1. Atypical chest pain    2. Tobacco abuse        Attestation:  I personally performed the services described in this documentation on this date 3/23/2020 for patient Jose D Mays. Martha Smith MD        I was the first provider for this patient on this visit. To the best of my ability I reviewed relevant prior medical records, electrocardiograms, laboratories, and radiologic studies. The patient's presenting problems were discussed, and the patient was in agreement with the care plan formulated and outlined with them.       Martha Smith MD    Please note that this dictation was completed with Dragon voice recognition software. Quite often unanticipated grammatical, syntax, homophones, and other interpretive errors are inadvertently transcribed by the computer software. Please disregard these errors and excuse any errors that have escaped final proofreading.

## 2020-03-23 NOTE — DISCHARGE INSTRUCTIONS
Your examination today is reassuring. There are no signs of a heart attack, or other significant problem with your heart or lungs today. We strongly advise you to quit smoking, and follow-up with your primary care physician.       Local Primary Care Physicians  [facility] [phone]  MD Iker Rose MD Balinda Ovens, MD [facility] Community Doctors [phone]  Charly Leiva, [facility]  Alejo Posadas, MD Seema Moran, MD Todd Denny  [phone]  [place] [address] [facility] [phone]  MD Mahad Banks MD Valorie Constable, MD Leita Snide, MD   [facility] [phone]  [facility] OSVALDO BLISS, MD Korey Kahn, MD Avalos Bailey, NP [address] [phone]  Gavi Ivy, MD Joseph Akins, MD Franki Bing, MD Lanney Gift, MD Letha Short, MD Michaela Saint, MD Pascual Gaitan MD   33 57 [facility]  Ping Mena MD [address] [phone]  MD Carin Peck, OMARI Pantoja, MD Heather Calabrese MD Maudie Robinsons, MD Nash Koyanagi, MD   [address] [phone]  Steve Olivera, MD Familia Kaminski, [facility]  Kailash Gamble, OMARI Arguelles, MD Henry Crane, MD Sha Patricia MD [facility] [phone]  MD Khloe Castillo MD Pennie Sorenson, MD Mickle Comes, MD Irving Craven, MD   [address] [phone]  MD Moni Lopez MD [place] [phone]  MD Travis Gutierrez MD Marcia Matar, MD   [facility] [phone] Ananya Bartholomew MD Erika Cypher, MD Saverio Rake, MD Kiana Hernandez, MD Onesimo Torres, NP  Lucho Flowers MD [address]   [phone]  Rikki Bower MD Sherman Villatoro MD Kevan Morning, MD               2102 [facility] [phone]  MD Jairo Birmingham, EDMUNDOP  ALISHA Lockhart, FNP Lovely Siad, PA-C Redge Mortimer, MD Conan Rieger, OMARI Geiger,              Miscellaneous:  Stepan Brown MD 1430 [facility] Departments   For adult and child immunizations, family planning, TB screening, STD testing and women's health services. [facility] [place] [phone]     [facility] 25   [address]   [address]   170 [facility]: Sary Patrick 200 [facility] [phone]     2400 [facility]        [address] [place] [address]     For primary care services, woman and child wellness, and some clinics providing specialty care. [facility] -- [facility] [facility] [address] [place] [address] [phone]   [address] [place] [address] 200 [facility] 3614 [facility] [phone]   339 [facility] End [facility] [address] [facility] [address] [phone]   [address] [place] [address] [employer] [address] [place] [address] 5850  Community  [phone]   [facility] [address] [address] [place] [phone]   [facility] 81 [facility] [phone]   Chyna Whiting [facility] [address] [place] [address] [phone]   [facility]: [facility] [address] [place] [address] 820 [facility] [address]     [place] 8299 8197 [facility]  7193  Community  [phone]   [employer]  [address] ([email or web address])         Sexual Health/Woman Wellness Clinics   For STD/HIV testing and treatment, pregnancy testing and services, men's health, birth control services, LGBT services, and hepatitis/HPV vaccine services. [facility] [employer] [employer] 201 [address] [facility] 75 [facility] SPECIALTY [facility] 1579 [address] [phone]   [facility] [address], 5th floor [phone]   Pregnancy 3928 [facility] [address] [facility] 118 [address]ano Garza [phone]        [place] 9967 High Blood [address]   [phone]   [place]   [phone]   Women, Infant and Children's Services: [facility] [phone]       7487 [facility] 121 [facility]   [phone]   [facility] 16.   1212 [facility]

## 2020-03-24 LAB
BACTERIA SPEC CULT: NORMAL
CC UR VC: NORMAL
SERVICE CMNT-IMP: NORMAL

## 2020-04-02 ENCOUNTER — PATIENT OUTREACH (OUTPATIENT)
Dept: CARDIOLOGY CLINIC | Age: 40
End: 2020-04-02

## 2020-05-18 ENCOUNTER — HOSPITAL ENCOUNTER (EMERGENCY)
Age: 40
Discharge: HOME | End: 2020-05-18
Attending: EMERGENCY MEDICINE
Payer: MEDICAID

## 2020-05-18 ENCOUNTER — APPOINTMENT (OUTPATIENT)
Dept: GENERAL RADIOLOGY | Age: 40
End: 2020-05-18
Attending: PHYSICIAN ASSISTANT
Payer: MEDICAID

## 2020-05-18 VITALS
WEIGHT: 286.38 LBS | HEIGHT: 66 IN | TEMPERATURE: 98.6 F | DIASTOLIC BLOOD PRESSURE: 98 MMHG | SYSTOLIC BLOOD PRESSURE: 149 MMHG | OXYGEN SATURATION: 98 % | BODY MASS INDEX: 46.02 KG/M2 | RESPIRATION RATE: 18 BRPM | HEART RATE: 77 BPM

## 2020-05-18 DIAGNOSIS — M25.531 BILATERAL WRIST PAIN: Primary | ICD-10-CM

## 2020-05-18 DIAGNOSIS — M25.532 BILATERAL WRIST PAIN: Primary | ICD-10-CM

## 2020-05-18 PROCEDURE — 99282 EMERGENCY DEPT VISIT SF MDM: CPT

## 2020-05-18 PROCEDURE — 73130 X-RAY EXAM OF HAND: CPT

## 2020-05-18 RX ORDER — NAPROXEN 500 MG/1
500 TABLET ORAL 2 TIMES DAILY WITH MEALS
Qty: 20 TAB | Refills: 0 | Status: SHIPPED | OUTPATIENT
Start: 2020-05-18 | End: 2020-12-17

## 2020-05-18 NOTE — ED PROVIDER NOTES
EMERGENCY DEPARTMENT HISTORY AND PHYSICAL EXAM      Date: 5/18/2020  Patient Name: Jarvis Mckenzie    History of Presenting Illness     Chief Complaint   Patient presents with    Hand Pain     bilateral hand swelling and pain that started yesterday. pt states she is having trouble gripping objects. History Provided By: Patient    HPI: Jravis Mckenzie, 36 y.o. female with significant PMHx for asthma, presents by POV to the ED with cc of bilateral hand/wrist pain. The patient has had discomfort for the past several days and she believes that this is due to her job working at [employer]. The patient states that she slammed her right wrist the yesterday, which increased the pain. The pain is a constant pain that is non-radiating and worsens with movement. There has been no treatment PTA. The patient has requested bilateral wrist immobilizers that she can wear while working. There are no other complaints, changes, or physical findings at this time. Social Hx: Tobacco (1 ppd), EtOH (deneis), Illicit drug use (denies)     PCP: Unknown, Provider    No current facility-administered medications on file prior to encounter. Current Outpatient Medications on File Prior to Encounter   Medication Sig Dispense Refill    albuterol sulfate 90 mcg/actuation aepb Take 1 Puff by inhalation every four (4) hours as needed for Wheezing. 1 Inhaler 0    [DISCONTINUED] naproxen (NAPROSYN) 500 mg tablet Take 1 Tab by mouth two (2) times daily (with meals). 10 Tab 0    methocarbamol (ROBAXIN-750) 750 mg tablet Take 1 Tab by mouth four (4) times daily. 30 Tab 0    methylPREDNISolone (MEDROL, USAMA,) 4 mg tablet Dosepak taper 1 Dose Pack 0    topiramate (TOPAMAX) 50 mg tablet Take 50 mg by mouth daily.  HYDROCHLOROTHIAZIDE PO Take  by mouth.  ALPRAZOLAM PO Take  by mouth.          Past History     Past Medical History:  Past Medical History:   Diagnosis Date    Arthritis     Asthma     bronchitis    Bronchitis     HTN, goal below 140/90 2012    Psychiatric disorder     bipolar       Past Surgical History:  Past Surgical History:   Procedure Laterality Date    DELIVERY       HX HEENT      4 wisdom teeth removed in 2011    HX TUBAL LIGATION      c section       Family History:  Family History   Problem Relation Age of Onset    Hypertension Father     Psychiatric Disorder Father         schzophrenia    Asthma Son     Asthma Son     Asthma Daughter        Social History:  Social History     Tobacco Use    Smoking status: Current Every Day Smoker     Packs/day: 1.00     Years: 12.00     Pack years: 12.00    Smokeless tobacco: Never Used   Substance Use Topics    Alcohol use: No    Drug use: No       Allergies: Allergies   Allergen Reactions    Codeine Nausea and Vomiting     Pt denies this allergy    Guaifenesin Nausea and Vomiting    Tessalon [Benzonatate] Other (comments)     Sneezing    Tramadol Rash         Review of Systems   Review of Systems   Constitutional: Negative for chills, diaphoresis and fever. HENT: Negative for congestion, ear pain, rhinorrhea and sore throat. Respiratory: Negative for cough and shortness of breath. Cardiovascular: Negative for chest pain. Gastrointestinal: Negative for abdominal pain, constipation, diarrhea, nausea and vomiting. Genitourinary: Negative for difficulty urinating, dysuria, frequency and hematuria. Musculoskeletal: Positive for arthralgias. Negative for myalgias. Neurological: Negative for headaches. All other systems reviewed and are negative. Physical Exam   Physical Exam  Vitals signs and nursing note reviewed. Constitutional:       General: She is not in acute distress. Appearance: She is well-developed. She is obese. She is not diaphoretic. Comments: 36 y.o. -American female    HENT:      Head: Normocephalic and atraumatic. Eyes:      General:         Right eye: No discharge. Left eye: No discharge. Conjunctiva/sclera: Conjunctivae normal.   Neck:      Musculoskeletal: Normal range of motion and neck supple. Cardiovascular:      Rate and Rhythm: Normal rate. Pulses: Normal pulses. Pulmonary:      Effort: Pulmonary effort is normal.   Musculoskeletal:      Comments: Bilateral hand/wrists without swelling, ecchymosis or deformity. Slight TTP to the right wrist. FROM of the fingers and wrist. Distal NV intact. Cap refill <  2 sec. Ambulatory without difficulty. Skin:     General: Skin is warm and dry. Neurological:      Mental Status: She is alert and oriented to person, place, and time. Psychiatric:         Behavior: Behavior normal.         Diagnostic Study Results     Labs - none    Radiologic Studies -   XR HAND RT MIN 3 V   Final Result   IMPRESSION: No acute abnormality. Medical Decision Making   I am the first provider for this patient. I reviewed the vital signs, available nursing notes, past medical history, past surgical history, family history and social history. Vital Signs-Reviewed the patient's vital signs. Patient Vitals for the past 12 hrs:   Temp Pulse Resp BP SpO2   05/18/20 1243 98.6 °F (37 °C) 77 18 (!) 149/98 98 %       Records Reviewed: Nursing Notes and Old Medical Records    Provider Notes (Medical Decision Making):   Patient presents with stable vital sign and bilateral wrist pain in the absence of significant injury. DDx fracture, sprain, strain, carpal tunnel. Xrays negative. Treated with NSAIDS and bilateral wrist immobilizers. ED Course:   Initial assessment performed. The patients presenting problems have been discussed, and they are in agreement with the care plan formulated and outlined with them. I have encouraged them to ask questions as they arise throughout their visit. Critical Care Time: None    Disposition:  DISCHARGE NOTE:  3:14 PM  The pt is ready for discharge.  The pt's signs, symptoms, diagnosis, and discharge instructions have been discussed and pt has conveyed their understanding. The pt is to follow up as recommended or return to ER should their symptoms worsen. Plan has been discussed and pt is in agreement. PLAN:  1. Current Discharge Medication List      CONTINUE these medications which have CHANGED    Details   naproxen (NAPROSYN) 500 mg tablet Take 1 Tab by mouth two (2) times daily (with meals). Qty: 20 Tab, Refills: 0         CONTINUE these medications which have NOT CHANGED    Details   albuterol sulfate 90 mcg/actuation aepb Take 1 Puff by inhalation every four (4) hours as needed for Wheezing. Qty: 1 Inhaler, Refills: 0      methocarbamol (ROBAXIN-750) 750 mg tablet Take 1 Tab by mouth four (4) times daily. Qty: 30 Tab, Refills: 0      methylPREDNISolone (MEDROL, USAMA,) 4 mg tablet Dosepak taper  Qty: 1 Dose Pack, Refills: 0      topiramate (TOPAMAX) 50 mg tablet Take 50 mg by mouth daily. HYDROCHLOROTHIAZIDE PO Take  by mouth. ALPRAZOLAM PO Take  by mouth. 2.   Follow-up Information     Follow up With Specialties Details Why Contact Elliot Khalil, DO Orthopedic Surgery In 1 week  [address] [place] [address]  [phone]        3. Wrist immobilizers as discussed. Return to ED if worse     Diagnosis     Clinical Impression:   1. Bilateral wrist pain          Please note that this dictation was completed with [employer], the computer voice recognition software. Quite often unanticipated grammatical, syntax, homophones, and other interpretive errors are inadvertently transcribed by the computer software. Please disregards these errors. Please excuse any errors that have escaped final proofreading. This note will not be viewable in [address]

## 2020-05-18 NOTE — DISCHARGE INSTRUCTIONS
Patient Education        Hand Pain: Care Instructions  Your Care Instructions    Common causes of hand pain are overuse and injuries, such as might happen during sports or home repair projects. Everyday wear and tear, especially as you get older, also can cause hand pain. Most minor hand injuries will heal on their own, and home treatment is usually all you need to do. If you have sudden and severe pain, you may need tests and treatment. Follow-up care is a key part of your treatment and safety. Be sure to make and go to all appointments, and call your doctor if you are having problems. It's also a good idea to know your test results and keep a list of the medicines you take. How can you care for yourself at home? · Take pain medicines exactly as directed. ? If the doctor gave you a prescription medicine for pain, take it as prescribed. ? If you are not taking a prescription pain medicine, ask your doctor if you can take an over-the-counter medicine. · Rest and protect your hand. Take a break from any activity that may cause pain. · Put ice or a cold pack on your hand for 10 to 20 minutes at a time. Put a thin cloth between the ice and your skin. · Prop up the sore hand on a pillow when you ice it or anytime you sit or lie down during the next 3 days. Try to keep it above the level of your heart. This will help reduce swelling. · If your doctor recommends a sling, splint, or elastic bandage to support your hand, wear it as directed. When should you call for help? Call 911 anytime you think you may need emergency care. For example, call if:    · Your hand turns cool or pale or changes color.    Call your doctor now or seek immediate medical care if:    · You cannot move your hand.     · Your hand pops, moves out of its normal position, and then returns to its normal position.     · You have signs of infection, such as:  ? Increased pain, swelling, warmth, or redness.   ? Red streaks leading from the sore area.  ? Pus draining from a place on your hand. ? A fever.     · Your hand feels numb or tingly.    Watch closely for changes in your health, and be sure to contact your doctor if:    · Your hand feels unstable when you try to use it.     · You do not get better as expected.     · You have any new symptoms, such as swelling.     · Bruises from an injury to your hand last longer than 2 weeks. Where can you learn more? Go to http://joao-ladi.info/  Enter R273 in the search box to learn more about \"Hand Pain: Care Instructions. \"  Current as of: June 26, 2019Content Version: 12.4  © 3551-4673 Healthwise, Incorporated. Care instructions adapted under license by [employer] (which disclaims liability or warranty for this information). If you have questions about a medical condition or this instruction, always ask your healthcare professional. [address] any warranty or liability for your use of this information.

## 2020-05-18 NOTE — ED NOTES
Pt provided with discharge paperwork. Pt verbalized understanding of discharge paperwork and follow up information. pt ambulated out of ed without assistance.

## 2020-05-18 NOTE — LETTER
[address] 
[facility] EMERGENCY DEPT 
[address] [place] [address] Maulik Gilmore [id number] [phone] Work/School Note Date: 5/18/2020 To Whom It May concern: 
 
Jarvis Mckenzie was seen and treated today in the emergency room by the following provider(s): 
Attending Provider: Chris Kong DO Physician Assistant: Lennox Gasser, [place]. Please excuse Jarvis Mckenzie from work today and tomorrow. Sincerely, Demetri Paige 
 
 
 
 Yes

## 2020-05-20 ENCOUNTER — VIRTUAL VISIT (OUTPATIENT)
Dept: ORTHOPEDIC SURGERY | Age: 40
End: 2020-05-20

## 2020-09-15 ENCOUNTER — APPOINTMENT (OUTPATIENT)
Dept: GENERAL RADIOLOGY | Age: 40
End: 2020-09-15
Attending: EMERGENCY MEDICINE
Payer: MEDICAID

## 2020-09-15 ENCOUNTER — HOSPITAL ENCOUNTER (EMERGENCY)
Age: 40
Discharge: HOME | End: 2020-09-15
Attending: EMERGENCY MEDICINE
Payer: MEDICAID

## 2020-09-15 VITALS
SYSTOLIC BLOOD PRESSURE: 151 MMHG | WEIGHT: 279.98 LBS | HEART RATE: 94 BPM | DIASTOLIC BLOOD PRESSURE: 101 MMHG | HEIGHT: 66 IN | TEMPERATURE: 99.5 F | OXYGEN SATURATION: 98 % | BODY MASS INDEX: 45 KG/M2 | RESPIRATION RATE: 20 BRPM

## 2020-09-15 DIAGNOSIS — S93.491A SPRAIN OF ANTERIOR TALOFIBULAR LIGAMENT OF RIGHT ANKLE, INITIAL ENCOUNTER: Primary | ICD-10-CM

## 2020-09-15 DIAGNOSIS — M19.079 ANKLE ARTHRITIS: ICD-10-CM

## 2020-09-15 DIAGNOSIS — J20.9 ACUTE BRONCHITIS, UNSPECIFIED ORGANISM: ICD-10-CM

## 2020-09-15 DIAGNOSIS — M19.071 OSTEOARTHRITIS OF RIGHT ANKLE AND FOOT: ICD-10-CM

## 2020-09-15 PROCEDURE — 99282 EMERGENCY DEPT VISIT SF MDM: CPT

## 2020-09-15 PROCEDURE — 71046 X-RAY EXAM CHEST 2 VIEWS: CPT

## 2020-09-15 PROCEDURE — 73610 X-RAY EXAM OF ANKLE: CPT

## 2020-09-15 RX ORDER — PROMETHAZINE HYDROCHLORIDE AND DEXTROMETHORPHAN HYDROBROMIDE 6.25; 15 MG/5ML; MG/5ML
5 SYRUP ORAL
Qty: 118 ML | Refills: 0 | Status: SHIPPED | OUTPATIENT
Start: 2020-09-15 | End: 2020-09-22

## 2020-09-15 RX ORDER — AZITHROMYCIN 250 MG/1
TABLET, FILM COATED ORAL
Qty: 6 TAB | Refills: 0 | Status: SHIPPED | OUTPATIENT
Start: 2020-09-15 | End: 2020-09-20

## 2020-09-15 RX ORDER — ALBUTEROL SULFATE 90 UG/1
2 AEROSOL, METERED RESPIRATORY (INHALATION)
Qty: 1 INHALER | Refills: 0 | Status: SHIPPED | OUTPATIENT
Start: 2020-09-15

## 2020-09-15 RX ORDER — PREDNISONE 20 MG/1
60 TABLET ORAL
Qty: 15 TAB | Refills: 0 | Status: SHIPPED | OUTPATIENT
Start: 2020-09-15 | End: 2020-09-20

## 2020-09-15 NOTE — DISCHARGE INSTRUCTIONS
Patient Education        Ankle Sprain: Care Instructions  Your Care Instructions     An ankle sprain can happen when you twist your ankle. The ligaments that support the ankle can get stretched and torn. Often the ankle is swollen and painful. Ankle sprains may take from several weeks to several months to heal. Usually, the more pain and swelling you have, the more severe your ankle sprain is and the longer it will take to heal. You can heal faster and regain strength in your ankle with good home treatment. It is very important to give your ankle time to heal completely, so that you do not easily hurt your ankle again. Follow-up care is a key part of your treatment and safety. Be sure to make and go to all appointments, and call your doctor if you are having problems. It's also a good idea to know your test results and keep a list of the medicines you take. How can you care for yourself at home? · Prop up your foot on pillows as much as possible for the next 3 days. Try to keep your ankle above the level of your heart. This will help reduce the swelling. · Follow your doctor's directions for wearing a splint or elastic bandage. Wrapping the ankle may help reduce or prevent swelling. · Your doctor may give you a splint, a brace, an air stirrup, or another form of ankle support to protect your ankle until it is healed. Wear it as directed while your ankle is healing. Do not remove it unless your doctor tells you to. After your ankle has healed, ask your doctor whether you should wear the brace when you exercise. · Put ice or cold packs on your injured ankle for 10 to 20 minutes at a time. Try to do this every 1 to 2 hours for the next 3 days (when you are awake) or until the swelling goes down. Put a thin cloth between the ice and your skin. · You may need to use crutches until you can walk without pain. If you do use crutches, try to bear some weight on your injured ankle if you can do so without pain.  This helps the ankle heal.  · Take pain medicines exactly as directed. ? If the doctor gave you a prescription medicine for pain, take it as prescribed. ? If you are not taking a prescription pain medicine, ask your doctor if you can take an over-the-counter medicine. · If you have been given ankle exercises to do at home, do them exactly as instructed. These can promote healing and help prevent lasting weakness. When should you call for help? Call your doctor now or seek immediate medical care if:    · Your pain is getting worse.     · Your swelling is getting worse.     · Your splint feels too tight or you are unable to loosen it. Watch closely for changes in your health, and be sure to contact your doctor if:    · You are not getting better after 1 week. Where can you learn more? Go to http://joaoProStor Systemsladi.info/  Enter A819 in the search box to learn more about \"Ankle Sprain: Care Instructions. \"  Current as of: March 2, 2020               Content Version: 12.6  © 2006-2020 [employer]. Care instructions adapted under license by [employer] (which disclaims liability or warranty for this information). If you have questions about a medical condition or this instruction, always ask your healthcare professional. [place] [address] any warranty or liability for your use of this information. Patient Education        Bronchitis: Care Instructions  Your Care Instructions     Bronchitis is inflammation of the bronchial tubes, which carry air to the lungs. The tubes swell and produce mucus, or phlegm. The mucus and inflamed bronchial tubes make you cough. You may have trouble breathing. Most cases of bronchitis are caused by viruses like those that cause colds. Antibiotics usually do not help and they may be harmful. Bronchitis usually develops rapidly and lasts about 2 to 3 weeks in otherwise healthy people.   Follow-up care is a key part of your treatment and safety. Be sure to make and go to all appointments, and call your doctor if you are having problems. It's also a good idea to know your test results and keep a list of the medicines you take. How can you care for yourself at home? · Take all medicines exactly as prescribed. Call your doctor if you think you are having a problem with your medicine. · Get some extra rest.  · Take an over-the-counter pain medicine, such as acetaminophen (Tylenol), ibuprofen (Advil, Motrin), or naproxen (Aleve) to reduce fever and relieve body aches. Read and follow all instructions on the label. · Do not take two or more pain medicines at the same time unless the doctor told you to. Many pain medicines have acetaminophen, which is Tylenol. Too much acetaminophen (Tylenol) can be harmful. · Take an over-the-counter cough medicine that contains dextromethorphan to help quiet a dry, hacking cough so that you can sleep. Avoid cough medicines that have more than one active ingredient. Read and follow all instructions on the label. · Breathe moist air from a humidifier, hot shower, or sink filled with hot water. The heat and moisture will thin mucus so you can cough it out. · Do not smoke. Smoking can make bronchitis worse. If you need help quitting, talk to your doctor about stop-smoking programs and medicines. These can increase your chances of quitting for good. When should you call for help? Call 911 anytime you think you may need emergency care. For example, call if:    · You have severe trouble breathing. Call your doctor now or seek immediate medical care if:    · You have new or worse trouble breathing.     · You cough up dark brown or bloody mucus (sputum).     · You have a new or higher fever.     · You have a new rash.    Watch closely for changes in your health, and be sure to contact your doctor if:    · You cough more deeply or more often, especially if you notice more mucus or a change in the color of your mucus.     · You are not getting better as expected. Where can you learn more? Go to http://joao-ladi.info/  Enter H333 in the search box to learn more about \"Bronchitis: Care Instructions. \"  Current as of: February 24, 2020               Content Version: 12.6  © 2516-4305 [employer]. Care instructions adapted under license by [employer] (which disclaims liability or warranty for this information). If you have questions about a medical condition or this instruction, always ask your healthcare professional. [address] any warranty or liability for your use of this information.

## 2020-09-15 NOTE — LETTER
[address] 
[facility] EMERGENCY DEPT 
[address] [id number] 
[phone] Work/School Note Date: 9/15/2020 To Whom It May concern: 
 
Shelbie Rivera was seen and treated today in the emergency room by the following provider(s): 
Attending Provider: Vee Frey MD 
Physician Assistant: Dorian Burnham., PA. Shelbie Rivera may return to work on 9/18/2020. Sincerely, 
 
 
 
 
Allen Santiago.  Demetri Erazo

## 2020-09-15 NOTE — ED PROVIDER NOTES
EMERGENCY DEPARTMENT HISTORY AND PHYSICAL EXAM      Date: 9/15/2020  Patient Name: Saba Tadeo    Please note that this dictation was completed with [employer], the computer voice recognition software. Quite often unanticipated grammatical, syntax, homophones, and other interpretive errors are inadvertently transcribed by the computer software. Please disregard these errors. Please excuse any errors that have escaped final proofreading. History of Presenting Illness     Chief Complaint   Patient presents with    Ankle swelling     Reports onset 3-4 days.  Nasal Congestion     Patient reports onset a week ago. History Provided By: Patient    HPI: Saba Tadeo, 36 y.o. female with PMHx significant for arthritis, asthma, bronchitis, bipolar disorder, morbid obesity, presents ambulatory to the ED with cc of ankle swelling with associated pain for the last 4 days as well as nasal congestion and a cough that is been ongoing for more than a week. Patient reports that she is an active tobacco user and smokes about 1 pack/day. She has been smoking since she was 15years old. She reports that she has not been exposed to anybody who has had COVID-19. She declines testing today. She reports that her ankle has been swelling. She works at [employer] and is on her feet constantly which exacerbates her pain. She denies any leg swelling, pain, chest pain, shortness of breath. PCP: Unknown, Provider    There are no other complaints, changes, or physical findings at this time. Current Outpatient Medications   Medication Sig Dispense Refill    predniSONE (Deltasone) 20 mg tablet Take 60 mg by mouth daily (with breakfast) for 5 days. 15 Tab 0    promethazine-dextromethorphan (PROMETHAZINE-DM) 6.25-15 mg/5 mL syrup Take 5 mL by mouth every four (4) hours as needed for Cough for up to 7 days.  118 mL 0    azithromycin (Zithromax Z-Jamal) 250 mg tablet Take 2 tablets (500mg) on day 1; then take 1 tablet (250 mg) on days 2, 3, 4 and 5 6 Tab 0    albuterol (ProAir HFA) 90 mcg/actuation inhaler Take 2 Puffs by inhalation every four (4) hours as needed for Wheezing. 1 Inhaler 0    naproxen (NAPROSYN) 500 mg tablet Take 1 Tab by mouth two (2) times daily (with meals). 20 Tab 0    methocarbamol (ROBAXIN-750) 750 mg tablet Take 1 Tab by mouth four (4) times daily. 30 Tab 0    topiramate (TOPAMAX) 50 mg tablet Take 50 mg by mouth daily.  HYDROCHLOROTHIAZIDE PO Take  by mouth.  ALPRAZOLAM PO Take  by mouth. Past History     Past Medical History:  Past Medical History:   Diagnosis Date    Arthritis     Asthma     bronchitis    Bronchitis     HTN, goal below 140/90 2012    Psychiatric disorder     bipolar       Past Surgical History:  Past Surgical History:   Procedure Laterality Date    DELIVERY       HX HEENT      4 wisdom teeth removed in 2011    HX TUBAL LIGATION      c section       Family History:  Family History   Problem Relation Age of Onset    Hypertension Father     Psychiatric Disorder Father         schzophrenia    Asthma Son     Asthma Son     Asthma Daughter        Social History:  Social History     Tobacco Use    Smoking status: Current Every Day Smoker     Packs/day: 1.00     Years: 12.00     Pack years: 12.00    Smokeless tobacco: Never Used   Substance Use Topics    Alcohol use: No    Drug use: No       Allergies: Allergies   Allergen Reactions    Codeine Nausea and Vomiting     Pt denies this allergy    Guaifenesin Nausea and Vomiting    Tessalon [Benzonatate] Other (comments)     Sneezing    Tramadol Rash         Review of Systems   Review of Systems   Constitutional: Negative. Negative for chills and fever. HENT: Positive for congestion, rhinorrhea and sneezing. Negative for sinus pressure and sinus pain. Eyes: Negative for pain and visual disturbance. Respiratory: Positive for cough. Negative for shortness of breath.     Cardiovascular: Negative for chest pain and palpitations. Gastrointestinal: Negative for abdominal pain, nausea and vomiting. Genitourinary: Negative for dysuria and hematuria. Musculoskeletal: Positive for arthralgias. Negative for myalgias. Skin: Negative for color change, rash and wound. Neurological: Negative for numbness and headaches. All other systems reviewed and are negative. Physical Exam   Physical Exam  Vitals signs and nursing note reviewed. Constitutional:       General: She is not in acute distress. Appearance: She is well-developed. She is not diaphoretic. Comments: 36 y.o. female in NAD  Communicates appropriately and in full sentences   HENT:      Head: Normocephalic and atraumatic. Right Ear: Tympanic membrane, ear canal and external ear normal.      Left Ear: Tympanic membrane, ear canal and external ear normal.      Nose: Congestion present. Eyes:      General:         Right eye: No discharge. Left eye: No discharge. Conjunctiva/sclera: Conjunctivae normal.      Pupils: Pupils are equal, round, and reactive to light. Neck:      Musculoskeletal: Normal range of motion and neck supple. Comments: No nuchal rigidity  Cardiovascular:      Rate and Rhythm: Normal rate and regular rhythm. Pulses: Normal pulses. Heart sounds: Normal heart sounds. Pulmonary:      Effort: Pulmonary effort is normal. No respiratory distress. Breath sounds: Wheezing (expiratory wheezing) present. Abdominal:      General: There is no distension. Palpations: Abdomen is soft. Tenderness: There is no abdominal tenderness. Musculoskeletal: Normal range of motion. General: Tenderness (Right lateral ankle over the anterior talofibular ligament. Mild appreciable swelling. Ambulatory independently. Strong DP and PT pulses.) present. No deformity.       Comments: No neurologic or vascular compromise on exam.   Negative Homans sign bilaterally   Skin: General: Skin is warm and dry. Coloration: Skin is not pale. Findings: No erythema or rash. Neurological:      Mental Status: She is alert and oriented to person, place, and time. Coordination: Coordination normal.           Diagnostic Study Results     Labs -   No results found for this or any previous visit (from the past 12 hour(s)). Radiologic Studies -   XR ANKLE RT MIN 3 V   Final Result   IMPRESSION: No acute abnormality. XR CHEST PA LAT   Final Result   IMPRESSION: No acute findings. CT Results  (Last 48 hours)    None        CXR Results  (Last 48 hours)               09/15/20 1426  XR CHEST PA LAT Final result    Impression:  IMPRESSION: No acute findings. Narrative:  EXAM: XR CHEST PA LAT       INDICATION: congestion       COMPARISON: None. FINDINGS: PA and lateral radiographs of the chest demonstrate clear lungs and   pleural margins. The cardiac and mediastinal contours and pulmonary vascularity   are normal. No hilar enlargement is shown. The bones and soft tissues are within   normal limits. Medical Decision Making   I am the first provider for this patient. I reviewed the vital signs, available nursing notes, past medical history, past surgical history, family history and social history. Vital Signs-Reviewed the patient's vital signs. Patient Vitals for the past 12 hrs:   Temp Pulse Resp BP SpO2   09/15/20 1322 99.5 °F (37.5 °C) 94 20 (!) 151/101 98 %         Records Reviewed: Nursing Notes and Old Medical Records    Provider Notes (Medical Decision Making):   Differential diagnosis includes bronchitis, COVID-19, emphysema, pneumonia, arthritis, sprain, strain, obesity, overuse injury. ED Course:   Initial assessment performed. The patients presenting problems have been discussed, and they are in agreement with the care plan formulated and outlined with them.   I have encouraged them to ask questions as they arise throughout their visit.            DISCHARGE NOTE:  Mary Steele's  results have been reviewed with her. She has been counseled regarding her diagnosis. She verbally conveys understanding and agreement of the signs, symptoms, diagnosis, treatment and prognosis and additionally agrees to follow up as recommended with Dr. Gwyn Palomares, Provider in 24 - 48 hours. She also agrees with the care-plan and conveys that all of her questions have been answered. I have also put together some discharge instructions for her that include: 1) educational information regarding their diagnosis, 2) how to care for their diagnosis at home, as well a 3) list of reasons why they would want to return to the ED prior to their follow-up appointment, should their condition change. She and/or family's questions have been answered. I have encouraged them to see the official results in [facility]\" or to retrieve the specifics of their results from medical records. PLAN:  1. Return precautions as discussed  2. Follow-up with providers as directed  3. Medications as prescribed    Return to ED if worse     Diagnosis     Clinical Impression:   1. Sprain of anterior talofibular ligament of right ankle, initial encounter    2. Acute bronchitis, unspecified organism    3. Ankle arthritis    4. Osteoarthritis of right ankle and foot        Discharge Medication List as of 9/15/2020  2:49 PM      START taking these medications    Details   predniSONE (Deltasone) 20 mg tablet Take 60 mg by mouth daily (with breakfast) for 5 days. , Normal, Disp-15 Tab,R-0      promethazine-dextromethorphan (PROMETHAZINE-DM) 6.25-15 mg/5 mL syrup Take 5 mL by mouth every four (4) hours as needed for Cough for up to 7 days. , Normal, Disp-118 mL,R-0      azithromycin (Zithromax Z-Jamal) 250 mg tablet Take 2 tablets (500mg) on day 1; then take 1 tablet (250 mg) on days 2, 3, 4 and 5, Normal, Disp-6 Tab,R-0      albuterol (ProAir HFA) 90 mcg/actuation inhaler Take 2 Puffs by inhalation every four (4) hours as needed for Wheezing., Normal, Disp-1 Inhaler,R-0         CONTINUE these medications which have NOT CHANGED    Details   naproxen (NAPROSYN) 500 mg tablet Take 1 Tab by mouth two (2) times daily (with meals). , Normal, Disp-20 Tab, R-0      methocarbamol (ROBAXIN-750) 750 mg tablet Take 1 Tab by mouth four (4) times daily. , Print, Disp-30 Tab, R-0      topiramate (TOPAMAX) 50 mg tablet Take 50 mg by mouth daily. , Historical Med      HYDROCHLOROTHIAZIDE PO Take  by mouth., Historical Med      ALPRAZOLAM PO Take  by mouth., Historical Med         STOP taking these medications       albuterol sulfate 90 mcg/actuation aepb Comments:   Reason for Stopping: Follow-up Information     Follow up With Specialties Details Why Contact Info    Unknown, Provider  Schedule an appointment as soon as possible for a visit in 2 days As needed, If symptoms worsen Patient not available to ask      Alison Avalos MD Pulmonary Disease Call If symptoms worsen, Possible further evaluation and treatment [address] [facility]  [address] [place] [address]  [phone]                This note will not be viewable in [address]

## 2020-09-15 NOTE — ED NOTES
Pt presents w/ c/o right ankle pain and swelling x3 days. Pt denies injury to the area. Pt also c/o cough and congestion.

## 2020-10-10 ENCOUNTER — HOSPITAL ENCOUNTER (EMERGENCY)
Age: 40
Discharge: HOME | End: 2020-10-10
Attending: EMERGENCY MEDICINE
Payer: MEDICAID

## 2020-10-10 ENCOUNTER — APPOINTMENT (OUTPATIENT)
Dept: GENERAL RADIOLOGY | Age: 40
End: 2020-10-10
Attending: EMERGENCY MEDICINE
Payer: MEDICAID

## 2020-10-10 VITALS
WEIGHT: 280.87 LBS | SYSTOLIC BLOOD PRESSURE: 147 MMHG | DIASTOLIC BLOOD PRESSURE: 100 MMHG | BODY MASS INDEX: 45.14 KG/M2 | TEMPERATURE: 99.4 F | HEART RATE: 93 BPM | HEIGHT: 66 IN | OXYGEN SATURATION: 96 % | RESPIRATION RATE: 20 BRPM

## 2020-10-10 DIAGNOSIS — S80.211A ABRASION OF RIGHT KNEE, INITIAL ENCOUNTER: ICD-10-CM

## 2020-10-10 DIAGNOSIS — M25.551 ACUTE RIGHT HIP PAIN: ICD-10-CM
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
## 2020-10-10 DIAGNOSIS — S80.212A ABRASION OF LEFT KNEE, INITIAL ENCOUNTER: Primary | ICD-10-CM

## 2020-10-10 PROCEDURE — 74011000250 HC RX REV CODE- 250: Performed by: EMERGENCY MEDICINE

## 2020-10-10 PROCEDURE — 96372 THER/PROPH/DIAG INJ SC/IM: CPT

## 2020-10-10 PROCEDURE — 74011250636 HC RX REV CODE- 250/636: Performed by: EMERGENCY MEDICINE

## 2020-10-10 PROCEDURE — 99283 EMERGENCY DEPT VISIT LOW MDM: CPT

## 2020-10-10 PROCEDURE — 73502 X-RAY EXAM HIP UNI 2-3 VIEWS: CPT

## 2020-10-10 PROCEDURE — 73560 X-RAY EXAM OF KNEE 1 OR 2: CPT

## 2020-10-10 RX ORDER — NAPROXEN 500 MG/1
500 TABLET ORAL 2 TIMES DAILY WITH MEALS
Qty: 60 TAB | Refills: 0 | Status: SHIPPED | OUTPATIENT
Start: 2020-10-10 | End: 2020-11-09

## 2020-10-10 RX ORDER — LIDOCAINE 4 G/100G
2 PATCH TOPICAL
Status: DISCONTINUED | OUTPATIENT
Start: 2020-10-10 | End: 2020-10-10 | Stop reason: HOSPADM

## 2020-10-10 RX ORDER — KETOROLAC TROMETHAMINE 30 MG/ML
60 INJECTION, SOLUTION INTRAMUSCULAR; INTRAVENOUS
Status: COMPLETED | OUTPATIENT
Start: 2020-10-10 | End: 2020-10-10

## 2020-10-10 RX ORDER — LIDOCAINE 50 MG/G
PATCH TOPICAL
Qty: 5 EACH | Refills: 0 | Status: SHIPPED | OUTPATIENT
Start: 2020-10-10 | End: 2020-12-17

## 2020-10-10 RX ADMIN — KETOROLAC TROMETHAMINE 60 MG: 30 INJECTION, SOLUTION INTRAMUSCULAR at 02:10

## 2020-10-10 NOTE — ED NOTES
Pt discharged home with written and verbal instructions given and patient ambulated out of ED without difficulty.

## 2020-10-10 NOTE — DISCHARGE INSTRUCTIONS
Patient Education        Scrapes (Abrasions): Care Instructions  Your Care Instructions  Scrapes (abrasions) are wounds where your skin has been rubbed or torn off. Most scrapes do not go deep into the skin, but some may remove several layers of skin. Scrapes usually don't bleed much, but they may ooze pinkish fluid. Scrapes on the head or face may appear worse than they are. They may bleed a lot because of the good blood supply to this area. Most scrapes heal well and may not need a bandage. They usually heal within 3 to 7 days. A large, deep scrape may take 1 to 2 weeks or longer to heal. A scab may form on some scrapes. Follow-up care is a key part of your treatment and safety. Be sure to make and go to all appointments, and call your doctor if you are having problems. It's also a good idea to know your test results and keep a list of the medicines you take. How can you care for yourself at home? · If your doctor told you how to care for your wound, follow your doctor's instructions. If you did not get instructions, follow this general advice:  ? Wash the scrape with clean water 2 times a day. Don't use hydrogen peroxide or alcohol, which can slow healing. ? You may cover the scrape with a thin layer of petroleum jelly, such as Vaseline, and a nonstick bandage. ? Apply more petroleum jelly and replace the bandage as needed. · Prop up the injured area on a pillow anytime you sit or lie down during the next 3 days. Try to keep it above the level of your heart. This will help reduce swelling. · Be safe with medicines. Take pain medicines exactly as directed. ? If the doctor gave you a prescription medicine for pain, take it as prescribed. ? If you are not taking a prescription pain medicine, ask your doctor if you can take an over-the-counter medicine. When should you call for help?    Call your doctor now or seek immediate medical care if:    · You have signs of infection, such as:  ? Increased pain, swelling, warmth, or redness around the scrape. ? Red streaks leading from the scrape. ? Pus draining from the scrape. ? A fever.     · The scrape starts to bleed, and blood soaks through the bandage. Oozing small amounts of blood is normal.   Watch closely for changes in your health, and be sure to contact your doctor if the scrape is not getting better each day. Where can you learn more? Go to http://www.gray.com/  Enter A374 in the search box to learn more about \"Scrapes (Abrasions): Care Instructions. \"  Current as of: June 26, 2019               Content Version: 12.6  © 2428-6249 [employer]. Care instructions adapted under license by [employer] (which disclaims liability or warranty for this information). If you have questions about a medical condition or this instruction, always ask your healthcare professional. [place] [address] any warranty or liability for your use of this information. Patient Education        Preventing Falls: Care Instructions  Your Care Instructions     Getting around your home safely can be a challenge if you have injuries or health problems that make it easy for you to fall. Loose rugs and furniture in walkways are among the dangers for many older people who have problems walking or who have poor eyesight. People who have conditions such as arthritis, osteoporosis, or dementia also have to be careful not to fall. You can make your home safer with a few simple measures. Follow-up care is a key part of your treatment and safety. Be sure to make and go to all appointments, and call your doctor if you are having problems. It's also a good idea to know your test results and keep a list of the medicines you take. How can you care for yourself at home? Taking care of yourself  · You may get dizzy if you do not drink enough water.  To prevent dehydration, drink plenty of fluids, enough so that your urine is light yellow or clear like water. Choose water and other caffeine-free clear liquids. If you have kidney, heart, or liver disease and have to limit fluids, talk with your doctor before you increase the amount of fluids you drink. · Exercise regularly to improve your strength, muscle tone, and balance. Walk if you can. Swimming may be a good choice if you cannot walk easily. · Have your vision and hearing checked each year or any time you notice a change. If you have trouble seeing and hearing, you might not be able to avoid objects and could lose your balance. · Know the side effects of the medicines you take. Ask your doctor or pharmacist whether the medicines you take can affect your balance. Sleeping pills or sedatives can affect your balance. · Limit the amount of alcohol you drink. Alcohol can impair your balance and other senses. · Ask your doctor whether calluses or corns on your feet need to be removed. If you wear loose-fitting shoes because of calluses or corns, you can lose your balance and fall. · Talk to your doctor if you have numbness in your feet. Preventing falls at home  · Remove raised doorway thresholds, throw rugs, and clutter. Repair loose carpet or raised areas in the floor. · Move furniture and electrical cords to keep them out of walking paths. · Use nonskid floor wax, and wipe up spills right away, especially on ceramic tile floors. · If you use a walker or cane, put rubber tips on it. If you use crutches, clean the bottoms of them regularly with an abrasive pad, such as steel wool. · Keep your house well lit, especially [place] [address], and outside walkways. Use night-lights in areas such as hallways and bathrooms. Add extra light switches or use remote switches (such as switches that go on or off when you clap your hands) to make it easier to turn lights on if you have to get up during the night. · Install sturdy handrails on stairways.   · Move items in your cabinets so that the things you use a lot are on the lower shelves (about waist level). · Keep a cordless phone and a flashlight with new batteries by your bed. If possible, put a phone in each of the main rooms of your house, or carry a cell phone in case you fall and cannot reach a phone. Or, you can wear a device around your neck or wrist. You push a button that sends a signal for help. · Wear low-heeled shoes that fit well and give your feet good support. Use footwear with nonskid soles. Check the heels and soles of your shoes for wear. Repair or replace worn heels or soles. · Do not wear socks without shoes on wood floors. · Walk on the grass when the sidewalks are slippery. If you live in an area that gets snow and ice in the winter, sprinkle salt on slippery steps and sidewalks. Preventing falls in the bath  · Install grab bars and nonskid mats inside and outside your shower or tub and near the toilet and sinks. · Use shower chairs and bath benches. · Use a hand-held shower head that will allow you to sit while showering. · Get into a tub or shower by putting the weaker leg in first. Get out of a tub or shower with your strong side first.  · Repair loose toilet seats and consider installing a raised toilet seat to make getting on and off the toilet easier. · Keep your bathroom door unlocked while you are in the shower. Where can you learn more? Go to http://www.Znapt[employer].com/  Enter G117 in the search box to learn more about \"Preventing Falls: Care Instructions. \"  Current as of: April 15, 2020               Content Version: 12.6  © 9579-5784 [employer]. Care instructions adapted under license by [employer] (which disclaims liability or warranty for this information).  If you have questions about a medical condition or this instruction, always ask your healthcare professional. [address] any warranty or liability for your use of this information. Patient Education        Hip Pain: Care Instructions  Your Care Instructions     Hip pain may be caused by many things, including overuse, a fall, or a twisting movement. Another cause of hip pain is arthritis. Your pain may increase when you stand up, walk, or squat. The pain may come and go or may be constant. Home treatment can help relieve hip pain, swelling, and stiffness. If your pain is ongoing, you may need more tests and treatment. Follow-up care is a key part of your treatment and safety. Be sure to make and go to all appointments, and call your doctor if you are having problems. It's also a good idea to know your test results and keep a list of the medicines you take. How can you care for yourself at home? · Take pain medicines exactly as directed. ? If the doctor gave you a prescription medicine for pain, take it as prescribed. ? If you are not taking a prescription pain medicine, ask your doctor if you can take an over-the-counter medicine. · Rest and protect your hip. Take a break from any activity, including standing or walking, that may cause pain. · Put ice or a cold pack against your hip for 10 to 20 minutes at a time. Try to do this every 1 to 2 hours for the next 3 days (when you are awake) or until the swelling goes down. Put a thin cloth between the ice and your skin. · Sleep on your healthy side with a pillow between your knees, or sleep on your back with pillows under your knees. · If there is no swelling, you can put moist heat, a heating pad, or a warm cloth on your hip. Do gentle stretching exercises to help keep your hip flexible. · Learn how to prevent falls. Have your vision and hearing checked regularly. Wear slippers or shoes with a nonskid sole. · Stay at a healthy weight. · Wear comfortable shoes. When should you call for help? Call 911 anytime you think you may need emergency care.  For example, call if:    · You have sudden chest pain and shortness of breath, or you cough up blood.     · You are not able to stand or walk or bear weight.     · Your buttocks, legs, or feet feel numb or tingly.     · Your leg or foot is cool or pale or changes color.     · You have severe pain. Call your doctor now or seek immediate medical care if:    · You have signs of infection, such as:  ? Increased pain, swelling, warmth, or redness in the hip area. ? Red streaks leading from the hip area. ? Pus draining from the hip area. ? A fever.     · You have signs of a blood clot, such as:  ? Pain in your calf, back of the knee, thigh, or groin. ? Redness and swelling in your leg or groin.     · You are not able to bend, straighten, or move your leg normally.     · You have trouble urinating or having bowel movements. Watch closely for changes in your health, and be sure to contact your doctor if:    · You do not get better as expected. Where can you learn more? Go to http://www.gray.com/  Enter Z720 in the search box to learn more about \"Hip Pain: Care Instructions. \"  Current as of: June 26, 2019               Content Version: 12.6  © 3657-8357 [employer]. Care instructions adapted under license by [employer] (which disclaims liability or warranty for this information). If you have questions about a medical condition or this instruction, always ask your healthcare professional. [place] [address] any warranty or liability for your use of this information. Patient Education        Joint Pain: Care Instructions  Your Care Instructions     Many people have small aches and pains from overuse or injury to muscles and joints. Joint injuries often happen during sports or recreation, work tasks, or projects around the home.  An overuse injury can happen when you put too much stress on a joint or when you do an activity that stresses the joint over and over, such as using the computer or rowing a boat.  You can take action at home to help your muscles and joints get better. You should feel better in 1 to 2 weeks, but it can take 3 months or more to heal completely. Follow-up care is a key part of your treatment and safety. Be sure to make and go to all appointments, and call your doctor if you are having problems. It's also a good idea to know your test results and keep a list of the medicines you take. How can you care for yourself at home? · Do not put weight on the injured joint for at least a day or two. · For the first day or two after an injury, do not take hot showers or baths, and do not use hot packs. The heat could make swelling worse. · Put ice or a cold pack on the sore joint for 10 to 20 minutes at a time. Try to do this every 1 to 2 hours for the next 3 days (when you are awake) or until the swelling goes down. Put a thin cloth between the ice and your skin. · Wrap the injury in an elastic bandage. Do not wrap it too tightly because this can cause more swelling. · Prop up the sore joint on a pillow when you ice it or anytime you sit or lie down during the next 3 days. Try to keep it above the level of your heart. This will help reduce swelling. · Take an over-the-counter pain medicine, such as acetaminophen (Tylenol), ibuprofen (Advil, Motrin), or naproxen (Aleve). Read and follow all instructions on the label. · After 1 or 2 days of rest, begin moving the joint gently. While the joint is still healing, you can begin to exercise using activities that do not strain or hurt the painful joint. When should you call for help? Call your doctor now or seek immediate medical care if:    · You have signs of infection, such as:  ? Increased pain, swelling, warmth, and redness. ? Red streaks leading from the joint. ? A fever.    Watch closely for changes in your health, and be sure to contact your doctor if:    · Your movement or symptoms are not getting better after 1 to 2 weeks of home treatment. Where can you learn more? Go to http://www.gray.com/  Enter P205 in the search box to learn more about \"Joint Pain: Care Instructions. \"  Current as of: March 2, 2020               Content Version: 12.6  © 5935-7990 [employer]. Care instructions adapted under license by [employer] (which disclaims liability or warranty for this information). If you have questions about a medical condition or this instruction, always ask your healthcare professional. [address] any warranty or liability for your use of this information.

## 2020-10-10 NOTE — ED PROVIDER NOTES
EMERGENCY DEPARTMENT HISTORY AND PHYSICAL EXAM     ------------------------------------------------------------------------------------------------------  Please note that this dictation was completed with [employer], the [employer] voice recognition software. Quite often unanticipated grammatical, syntax, homophones, and other interpretive errors are inadvertently transcribed by the computer software. Please disregard these errors. Please excuse any errors that have escaped final proofreading.  -----------------------------------------------------------------------------------------------------------------    Date: 10/10/2020  Patient Name: Sheela Arechiga    History of Presenting Illness     Chief Complaint   Patient presents with    Knee Injury     pt reports around 8pm pt was running from a dog, pt fell and has abrasions to her bilateral knees. Pt arrived ambulatory to triage, texting/playing with cell phone    Hip Pain     pt reports having hip pain on the right side. History Provided By: Patient    HPI: Sheela Arechiga is a 36 y.o. female, with significant pmhx of hypertension, arthritis, bipolar disorder, asthma, who presents via private vehicle to the ED with c/o bilateral knee pain, right hip pain. Patient reports around 8 PM she was running away from a dog when she tripped and fell onto both of her knees. Denies having hit her head or loss of consciousness. Notes that she was subsequently went home and rested with development of right hip and right arm pain. Notes having abrasions to bilateral anterior knees without Active bleeding. Patient has been ambulatory since the time of the incident. Took 1 exercise Tylenol at home with minimal relief. Continues to have full range of motion of her right upper extremity that is not limited to pain and no deformity noted. Patient reports tenderness on the lateral aspect of her right hip that was not relieved with a heating pad.   Pt also specifically denies any recent fevers, chills, CP, SOB, nausea, vomiting, diarrhea, abd pain, changes in BM, urinary sxs, or headache. PCP: Unknown, Provider    Social Hx: + tobacco, denies EtOH, denies Illicit Drugs     There are no other complaints, changes, or physical findings at this time. Allergies   Allergen Reactions    Codeine Nausea and Vomiting     Pt denies this allergy    Guaifenesin Nausea and Vomiting    Tessalon [Benzonatate] Other (comments)     Sneezing    Tramadol Rash         Current Facility-Administered Medications   Medication Dose Route Frequency Provider Last Rate Last Dose    lidocaine 4 % patch 2 Patch  2 Patch TransDERmal NOW Chela Guerrero MD   2 Patch at 10/10/20 0212     Current Outpatient Medications   Medication Sig Dispense Refill    naproxen (NAPROSYN) 500 mg tablet Take 1 Tab by mouth two (2) times daily (with meals) for 30 days. 60 Tab 0    lidocaine (Lidoderm) 5 % Apply patch to the affected area for 12 hours a day and remove for 12 hours a day. 5 Each 0    albuterol (ProAir HFA) 90 mcg/actuation inhaler Take 2 Puffs by inhalation every four (4) hours as needed for Wheezing. 1 Inhaler 0    naproxen (NAPROSYN) 500 mg tablet Take 1 Tab by mouth two (2) times daily (with meals). 20 Tab 0    methocarbamol (ROBAXIN-750) 750 mg tablet Take 1 Tab by mouth four (4) times daily. 30 Tab 0    topiramate (TOPAMAX) 50 mg tablet Take 50 mg by mouth daily.  HYDROCHLOROTHIAZIDE PO Take  by mouth.  ALPRAZOLAM PO Take  by mouth.          Past History     Past Medical History:  Past Medical History:   Diagnosis Date    Arthritis     Asthma     bronchitis    Bronchitis     HTN, goal below 140/90 2012    Psychiatric disorder     bipolar       Past Surgical History:  Past Surgical History:   Procedure Laterality Date    DELIVERY       HX HEENT      4 wisdom teeth removed in 2011    HX TUBAL LIGATION      c section       Family History:  Family History   Problem Relation Age of Onset    Hypertension Father     Psychiatric Disorder Father         schzophrenia    Asthma Son     Asthma Son     Asthma Daughter        Social History:  Social History     Tobacco Use    Smoking status: Current Every Day Smoker     Packs/day: 1.00     Years: 12.00     Pack years: 12.00    Smokeless tobacco: Never Used   Substance Use Topics    Alcohol use: No    Drug use: No       Allergies: Allergies   Allergen Reactions    Codeine Nausea and Vomiting     Pt denies this allergy    Guaifenesin Nausea and Vomiting    Tessalon [Benzonatate] Other (comments)     Sneezing    Tramadol Rash         Review of Systems   Review of Systems   Constitutional: Negative. Negative for fever. Eyes: Negative. Respiratory: Negative. Negative for shortness of breath. Cardiovascular: Negative for chest pain. Gastrointestinal: Negative for abdominal pain, nausea and vomiting. Endocrine: Negative. Genitourinary: Negative. Negative for difficulty urinating, dysuria and hematuria. Musculoskeletal: Positive for arthralgias. Skin: Positive for wound (Abrasion to bilateral knees). Neurological: Negative. Psychiatric/Behavioral: Negative for suicidal ideas. All other systems reviewed and are negative. Physical Exam   Physical Exam  Vitals signs and nursing note reviewed. Constitutional:       General: She is not in acute distress. Appearance: She is well-developed. She is obese. She is not diaphoretic. HENT:      Head: Normocephalic and atraumatic. Nose: Nose normal.   Eyes:      General: No scleral icterus. Conjunctiva/sclera: Conjunctivae normal.   Neck:      Musculoskeletal: Normal range of motion. Trachea: No tracheal deviation. Cardiovascular:      Rate and Rhythm: Normal rate and regular rhythm. Heart sounds: Normal heart sounds. No murmur. No friction rub. Pulmonary:      Effort: Pulmonary effort is normal. No respiratory distress. Breath sounds: Normal breath sounds. No stridor. No wheezing or rales. Abdominal:      General: Bowel sounds are normal. There is no distension. Palpations: Abdomen is soft. Tenderness: There is no abdominal tenderness. There is no rebound. Musculoskeletal: Normal range of motion. Right hip: She exhibits tenderness. She exhibits normal range of motion, normal strength and no swelling. Legs:    Skin:     General: Skin is warm and dry. Findings: Abrasion present. No rash. Neurological:      Mental Status: She is alert and oriented to person, place, and time. Cranial Nerves: No cranial nerve deficit. Psychiatric:         Speech: Speech normal.         Behavior: Behavior normal.         Thought Content: Thought content normal.         Judgment: Judgment normal.           Diagnostic Study Results     Labs -   No results found for this or any previous visit (from the past 12 hour(s)). Radiologic Studies -   XR KNEES BI AP LAT   Final Result   IMPRESSION: Mild DJD no acute findings. XR HIP RT W OR WO PELV 2-3 VWS   Final Result   IMPRESSION:    No acute abnormality. CT Results  (Last 48 hours)    None        CXR Results  (Last 48 hours)    None            Medical Decision Making   I am the first provider for this patient. I reviewed the vital signs, available nursing notes, past medical history, past surgical history, family history and social history. Vital Signs-Reviewed the patient's vital signs.   Patient Vitals for the past 12 hrs:   Temp Pulse Resp BP SpO2   10/10/20 0120 99.4 °F (37.4 °C) 93 20 (!) 147/100 96 %       Pulse Oximetry Analysis - 96% on RA    Records Reviewed/Interpretted: Nursing Notes from triage and Old Medical Records, cleaning review of her pain management plan    Provider Notes (Medical Decision Making):     DDX:  Contusions, abrasions, fracture    Plan:  Hip and knee x-rays, analgesic, lidocaine patches    Impression:  Bilateral knee abrasions, contusions, hip pain    ED Course:   Initial assessment performed. The patients presenting problems have been discussed, and they are in agreement with the care plan formulated and outlined with them. I have encouraged them to ask questions as they arise throughout their visit. I reviewed our electronic medical record system for any past medical records that were available that may contribute to the patients current condition, the nursing notes and and vital signs from today's visit  Nursing notes will be reviewed as they become available in realtime while the pt has been in the ED. Terese Roberts MD        TOBACCO COUNSELING:  During evaluation pt reported that they are a current tobacco user. I have spent 3 minutes discussing the medical risks of prolonged smoking habits and advised the patient of the benefits of the cessation of smoking, providing specific suggestions on how to quit. Pt has been counseled and encouraged to quit as soon as possible in order to decrease further risks to their health. Pt has conveyed their understanding of the risks involved should they continue to use tobacco products. Terese Roberts MD    I personally reviewed/interpreted pt's imaging. Agree with official read by radiology as noted above. Terese Roberts MD      Progress note:  Pt noted to be feeling better, ready for discharge. Discussed  imaging findings with pt, specifically noting no evidence of fracture. Pt will follow up with [facility] as instructed. All questions have been answered, pt voiced understanding and agreement with plan. Specific return precautions provided in addition to instructions for pt to return to the ED immediately should sx worsen at any time. Terese Roberts MD             Critical Care Time:     none      Diagnosis     Clinical Impression:   1. Abrasion of left knee, initial encounter    2. Abrasion of right knee, initial encounter    3. Acute right hip pain    4. Fall, initial encounter        PLAN:  1. Discharge Medication List as of 10/10/2020  2:21 AM      START taking these medications    Details   !! naproxen (NAPROSYN) 500 mg tablet Take 1 Tab by mouth two (2) times daily (with meals) for 30 days. , Normal, Disp-60 Tab,R-0      lidocaine (Lidoderm) 5 % Apply patch to the affected area for 12 hours a day and remove for 12 hours a day., Normal, Disp-5 Each,R-0       !! - Potential duplicate medications found. Please discuss with provider. CONTINUE these medications which have NOT CHANGED    Details   albuterol (ProAir HFA) 90 mcg/actuation inhaler Take 2 Puffs by inhalation every four (4) hours as needed for Wheezing., Normal, Disp-1 Inhaler,R-0      !! naproxen (NAPROSYN) 500 mg tablet Take 1 Tab by mouth two (2) times daily (with meals). , Normal, Disp-20 Tab, R-0      methocarbamol (ROBAXIN-750) 750 mg tablet Take 1 Tab by mouth four (4) times daily. , Print, Disp-30 Tab, R-0      topiramate (TOPAMAX) 50 mg tablet Take 50 mg by mouth daily. , Historical Med      HYDROCHLOROTHIAZIDE PO Take  by mouth., Historical Med      ALPRAZOLAM PO Take  by mouth., Historical Med       !! - Potential duplicate medications found. Please discuss with provider. 2.   Follow-up Information     Follow up With Specialties Details Why Contact Info    [facility] Emergency Medicine  As needed [address] [place] [address]  [phone]    your pcp  Schedule an appointment as soon as possible for a visit in 2 days          Return to ED if worse     Disposition:   The patient's results have been reviewed with family and/or caregiver. They verbally convey their understanding and agreement of the patient's signs, symptoms, diagnosis, treatment and prognosis and additionally agree to follow up as recommended in the discharge instructions or to return to the Emergency Room should the patient's condition change prior to their follow-up appointment.  The family and/or caregiver verbally agrees with the care-plan and all of their questions have been answered. The discharge instructions have also been provided to the them with educational information regarding the patient's diagnosis as well a list of reasons why the patient would want to return to the ER prior to their follow-up appointment should their condition change. Linsey Mast MD          This note will not be viewable in [address]

## 2020-12-16 ENCOUNTER — HOSPITAL ENCOUNTER (EMERGENCY)
Age: 40
Discharge: HOME | End: 2020-12-17
Attending: EMERGENCY MEDICINE | Admitting: EMERGENCY MEDICINE
Payer: MEDICAID

## 2020-12-16 VITALS
HEIGHT: 66 IN | DIASTOLIC BLOOD PRESSURE: 114 MMHG | BODY MASS INDEX: 46.91 KG/M2 | TEMPERATURE: 97.7 F | SYSTOLIC BLOOD PRESSURE: 156 MMHG | WEIGHT: 291.89 LBS | RESPIRATION RATE: 16 BRPM | HEART RATE: 84 BPM | OXYGEN SATURATION: 100 %

## 2020-12-16 DIAGNOSIS — Z20.822 EXPOSURE TO COVID-19 VIRUS: Primary | ICD-10-CM

## 2020-12-16 PROCEDURE — 99281 EMR DPT VST MAYX REQ PHY/QHP: CPT

## 2020-12-16 NOTE — LETTER
[address] 
[facility] EMERGENCY DEPT 
[address] [facility] [address] [place] [address] [id number] [phone] Work/School Note Date: 12/16/2020 To Whom It May concern: 
 
Jose A Enriquez was seen and treated today in the emergency room by the following provider(s): 
Attending Provider: Barron Eisenberg MD 
Physician Assistant: Demetri Alfaro. Per the current CDC guidelines the patient must quarantine through Tuesday, December 22, 2020. The patient's quarantine can be shortened to and on Saturday, December 19, 2020 pending that she has a negative Covid test between Thursday, December 17, 2020 and Saturday, December 19, 2020. At this time it is too early for the patient to receive her coronavirus screening test. 
 
Sincerely, Demetri Burton

## 2020-12-17 ENCOUNTER — OFFICE VISIT (OUTPATIENT)
Dept: URGENT CARE | Age: 40
End: 2020-12-17
Payer: MEDICAID

## 2020-12-17 VITALS — RESPIRATION RATE: 16 BRPM | OXYGEN SATURATION: 98 % | HEART RATE: 93 BPM | TEMPERATURE: 98.3 F

## 2020-12-17 DIAGNOSIS — Z20.822 EXPOSURE TO COVID-19 VIRUS: Primary | ICD-10-CM

## 2020-12-17 PROCEDURE — 99203 OFFICE O/P NEW LOW 30 MIN: CPT | Performed by: FAMILY MEDICINE

## 2020-12-17 NOTE — ED PROVIDER NOTES
EMERGENCY DEPARTMENT HISTORY AND PHYSICAL EXAM      Date: 12/16/2020  Patient Name: Moses Sullivan    History of Presenting Illness     Chief Complaint   Patient presents with    Covid Testing     reports she was exposed to someone with covid at work and her employer will not let her return until she is tested. History Provided By: Patient    HPI: Moses Sullivan, 36 y.o. female with significant PMHx for asthma, presents by POV to the ED without complaint. She reports that 4 days ago she drove her nephew somewhere. She learned the following day that he tested positive for COVID-19. The patient comes to the ED requesting a screening test in order to be able to return to work. She has no pain. She has no symptoms. There is been no treatment prior to arrival.    There are no other complaints, changes, or physical findings at this time. Social Hx: Tobacco (1/2 ppd), EtOH (denies), Illicit drug use (denies), works in a [employer] as a       PCP: Yu Gonzalez NP    No current facility-administered medications on file prior to encounter. Current Outpatient Medications on File Prior to Encounter   Medication Sig Dispense Refill    lidocaine (Lidoderm) 5 % Apply patch to the affected area for 12 hours a day and remove for 12 hours a day. 5 Each 0    albuterol (ProAir HFA) 90 mcg/actuation inhaler Take 2 Puffs by inhalation every four (4) hours as needed for Wheezing. 1 Inhaler 0    naproxen (NAPROSYN) 500 mg tablet Take 1 Tab by mouth two (2) times daily (with meals). 20 Tab 0    methocarbamol (ROBAXIN-750) 750 mg tablet Take 1 Tab by mouth four (4) times daily. 30 Tab 0    topiramate (TOPAMAX) 50 mg tablet Take 50 mg by mouth daily.  HYDROCHLOROTHIAZIDE PO Take  by mouth.  ALPRAZOLAM PO Take  by mouth.          Past History     Past Medical History:  Past Medical History:   Diagnosis Date    Arthritis     Asthma     bronchitis    Bronchitis     HTN, goal below 140/90 2012    Psychiatric disorder     bipolar       Past Surgical History:  Past Surgical History:   Procedure Laterality Date    DELIVERY       HX HEENT      4 wisdom teeth removed in 2011    HX TUBAL LIGATION      c section       Family History:  Family History   Problem Relation Age of Onset    Hypertension Father     Psychiatric Disorder Father         schzophrenia    Asthma Son     Asthma Son     Asthma Daughter        Social History:  Social History     Tobacco Use    Smoking status: Current Every Day Smoker     Packs/day: 1.00     Years: 12.00     Pack years: 12.00    Smokeless tobacco: Never Used   Substance Use Topics    Alcohol use: No    Drug use: No       Allergies: Allergies   Allergen Reactions    Codeine Nausea and Vomiting     Pt denies this allergy    Guaifenesin Nausea and Vomiting    Tessalon [Benzonatate] Other (comments)     Sneezing    Tramadol Rash         Review of Systems   Review of Systems   Constitutional: Negative for chills, diaphoresis and fever. HENT: Negative for congestion, ear pain, rhinorrhea and sore throat. Respiratory: Negative for cough and shortness of breath. Cardiovascular: Negative for chest pain. Gastrointestinal: Negative for abdominal pain, constipation, diarrhea, nausea and vomiting. Genitourinary: Negative for difficulty urinating, dysuria, frequency and hematuria. Musculoskeletal: Negative for arthralgias and myalgias. Neurological: Negative for headaches. All other systems reviewed and are negative. Physical Exam   Physical Exam  Vitals signs and nursing note reviewed. Constitutional:       General: She is not in acute distress. Appearance: She is well-developed. She is not diaphoretic. Comments: 36 y.o. -American female    HENT:      Head: Normocephalic and atraumatic. Eyes:      General:         Right eye: No discharge. Left eye: No discharge.       Conjunctiva/sclera: Conjunctivae normal.   Neck:      Musculoskeletal: Normal range of motion and neck supple. Cardiovascular:      Rate and Rhythm: Normal rate and regular rhythm. Heart sounds: Normal heart sounds. No murmur. Pulmonary:      Effort: Pulmonary effort is normal. No respiratory distress. Breath sounds: Normal breath sounds. Skin:     General: Skin is warm and dry. Neurological:      Mental Status: She is alert and oriented to person, place, and time. Psychiatric:         Behavior: Behavior normal.         Diagnostic Study Results     Labs - none    Radiologic Studies - none    Medical Decision Making   I am the first provider for this patient. I reviewed the vital signs, available nursing notes, past medical history, past surgical history, family history and social history. Vital Signs-Reviewed the patient's vital signs. Patient Vitals for the past 12 hrs:   Temp Pulse Resp BP SpO2   12/16/20 2308 97.7 °F (36.5 °C) 84 16 (!) 156/114 100 %       Records Reviewed: Nursing Notes and Old Medical Records   Reviewed previous ED records. Provider Notes (Medical Decision Making): The patient presents the ED for screening Covid test following exposure. The current CDC guidelines recommend that the screening test be completed between days 5 and 7 post exposure. At this time the patient is only 4 days from exposure. This has been discussed with the patient and she is in good understanding. I have given her resources of places in the community that may be able to test her as an outpatient in the next 1 to 3 days. ED Course:   Initial assessment performed. The patients presenting problems have been discussed, and they are in agreement with the care plan formulated and outlined with them. I have encouraged them to ask questions as they arise throughout their visit.     Tobacco Counseling  Discussed the risks of smoking and the benefits of smoking cessation as well as the long term sequelae of smoking with the patient. The patient verbalized their understanding. Counseled patient for approximately 3 minutes. Critical Care Time: None    Disposition:  DISCHARGE NOTE:  11:32 PM  The pt is ready for discharge. The pt's signs, symptoms, diagnosis, and discharge instructions have been discussed and pt has conveyed their understanding. The pt is to follow up as recommended or return to ER should their symptoms worsen. Plan has been discussed and pt is in agreement. PLAN:  1. Current Discharge Medication List        2. Follow-up Information     Follow up With Specialties Details Why [address] [facility] In 2 days  [address]  [phone]      3. Stop smoking. Return to ED if worse     Diagnosis     Clinical Impression:   1. Exposure to COVID-19 virus          Please note that this dictation was completed with [employer], the computer voice recognition software. Quite often unanticipated grammatical, syntax, homophones, and other interpretive errors are inadvertently transcribed by the computer software. Please disregards these errors. Please excuse any errors that have escaped final proofreading.

## 2020-12-17 NOTE — PROGRESS NOTES
This patient was seen at [address] [place] [address] Urgent Care while in their vehicle due to COVID-19 pandemic with PPE and focused examination in order to decrease community viral transmission. The patient/guardian gave verbal consent to treat. Preet Bolden is a 36 y.o. female who presents for COVID-19 testing. Was exposed to COVID-19 by cousin, last contact 4 days ago. Denies cough, fever, SOB. The history is provided by the patient.         Past Medical History:   Diagnosis Date    Arthritis     Asthma     bronchitis    Bronchitis     HTN, goal below 140/90 2012    Psychiatric disorder     bipolar        Past Surgical History:   Procedure Laterality Date    DELIVERY       HX HEENT      4 wisdom teeth removed in 2011    HX TUBAL LIGATION      c section         Family History   Problem Relation Age of Onset    Hypertension Father     Psychiatric Disorder Father         schzophrenia    Asthma Son     Asthma Son     Asthma Daughter         Social History     Socioeconomic History    Marital status: SINGLE     Spouse name: Not on file    Number of children: Not on file    Years of education: Not on file    Highest education level: Not on file   Occupational History    Not on file   Social Needs    Financial resource strain: Not on file    Food insecurity     Worry: Not on file     Inability: Not on file    Transportation needs     Medical: Not on file     Non-medical: Not on file   Tobacco Use    Smoking status: Current Every Day Smoker     Packs/day: 1.00     Years: 12.00     Pack years: 12.00    Smokeless tobacco: Never Used   Substance and Sexual Activity    Alcohol use: No    Drug use: No    Sexual activity: Yes     Partners: Male     Birth control/protection: Condom   Lifestyle    Physical activity     Days per week: Not on file     Minutes per session: Not on file    Stress: Not on file   Relationships    Social connections     Talks on phone: Not on file Gets together: Not on file     Attends Denominational service: Not on file     Active member of club or organization: Not on file     Attends meetings of clubs or organizations: Not on file     Relationship status: Not on file    Intimate partner violence     Fear of current or ex partner: Not on file     Emotionally abused: Not on file     Physically abused: Not on file     Forced sexual activity: Not on file   Other Topics Concern    Not on file   Social History Narrative    Not on file                ALLERGIES: Codeine, Guaifenesin, Tessalon [benzonatate], and Tramadol    Review of Systems   Constitutional: Negative for activity change, appetite change, chills and fever. HENT: Negative for congestion, rhinorrhea and sore throat. Respiratory: Negative for cough, shortness of breath and wheezing. Cardiovascular: Negative for chest pain. Gastrointestinal: Negative for abdominal pain, diarrhea, nausea and vomiting. Musculoskeletal: Negative for myalgias. Neurological: Negative for headaches. Vitals:    12/17/20 1402   Pulse: 93   Resp: 16   Temp: 98.3 °F (36.8 °C)   SpO2: 98%       Physical Exam  Vitals signs and nursing note reviewed. Constitutional:       General: She is not in acute distress. Appearance: She is well-developed. She is not diaphoretic. Pulmonary:      Effort: Pulmonary effort is normal. No respiratory distress. Breath sounds: Normal breath sounds. No stridor. No wheezing, rhonchi or rales. Neurological:      Mental Status: She is alert. Psychiatric:         Behavior: Behavior normal.         Thought Content: Thought content normal.         Judgment: Judgment normal.         MDM    ICD-10-CM ICD-9-CM   1.  Exposure to COVID-19 virus  Z20.828 V01.79       Orders Placed This Encounter    NOVEL CORONAVIRUS (COVID-19)     Scheduling Instructions:      1) Due to current limited availability of the COVID-19 PCR test, tests will be prioritized and may not be completed.   2) Order only if the test result will change clinical management or necessary for a return to mission-critical employment decision.              3) Print and instruct patient to adhere to CDC home isolation program. (Link Above)              4) Set up or refer patient for a monitoring program.              5) Have patient sign up for and leverage MyChart (if not previously done). Order Specific Question:   Is this test for diagnosis or screening? Answer:   Screening     Order Specific Question:   Symptomatic for COVID-19 as defined by CDC? Answer:   No     Order Specific Question:   Hospitalized for COVID-19? Answer:   No     Order Specific Question:   Admitted to ICU for COVID-19? Answer:   No     Order Specific Question:   Employed in healthcare setting? Answer:   No     Order Specific Question:   Resident in a congregate (group) care setting? Answer:   No     Order Specific Question:   Pregnant? Answer:   No     Order Specific Question:   Previously tested for COVID-19? Answer:   No        Quarantine  Deep breathing exercises, ambulation      If signs and symptoms become worse the pt is to go to the ER.          Procedures

## 2020-12-17 NOTE — LETTER
December 17, 2020 [address] [place] [id number] Dear Ariel Rai: Thank you for requesting access to [facility]. Please follow the instructions below to securely access and download your online medical record. [facility] allows you to send messages to your doctor, view your test results, renew your prescriptions, schedule appointments, and more. How Do I Sign Up? 1. In your internet browser, go to [email or web address]. 2. Click on the First Time User? Click Here link in the Sign In box. You will see the New Member Sign Up page. 3. Enter your Arc[facility] Access Code exactly as it appears below. You will not need to use this code after youve completed the sign-up process. If you do not sign up before the expiration date, you must request a new code. Arc[facility] Access Code: Activation code not generated Current Arcarios Status: Active 4. Enter the last four digits of your Social Security Number (xxxx) and Date of Birth (mm/dd/yyyy) as indicated and click Submit. You will be taken to the next sign-up page. 5. Create a Arcarios ID. This will be your Arcarios login ID and cannot be changed, so think of one that is secure and easy to remember. 6. Create a Arc[facility] password. You can change your password at any time. 7. Enter your Password Reset Question and Answer. This can be used at a later time if you forget your password. 8. Enter your e-mail address. You will receive e-mail notification when new information is available in [address] 9. Click Sign Up. You can now view and download portions of your medical record. 10. Click the Download Summary menu link to download a portable copy of your medical information. Additional Information If you have questions, please visit the Frequently Asked Questions section of the [employer] website at [email or web address] Remember, LOGIC DEVICES is NOT to be used for urgent needs. For medical emergencies, dial 911. Now available from your iPhone and Android! Sincerely, [employer]

## 2020-12-19 LAB — SARS-COV-2, NAA: NOT DETECTED

## 2021-01-17 ENCOUNTER — HOSPITAL ENCOUNTER (EMERGENCY)
Age: 41
Discharge: HOME | End: 2021-01-17
Attending: EMERGENCY MEDICINE | Admitting: EMERGENCY MEDICINE
Payer: MEDICAID

## 2021-01-17 VITALS
RESPIRATION RATE: 16 BRPM | DIASTOLIC BLOOD PRESSURE: 90 MMHG | TEMPERATURE: 98.2 F | HEART RATE: 82 BPM | HEIGHT: 66 IN | BODY MASS INDEX: 46.49 KG/M2 | WEIGHT: 289.24 LBS | OXYGEN SATURATION: 100 % | SYSTOLIC BLOOD PRESSURE: 152 MMHG

## 2021-01-17 DIAGNOSIS — Z72.0 TOBACCO ABUSE: ICD-10-CM

## 2021-01-17 DIAGNOSIS — I10 ACCELERATED HYPERTENSION: ICD-10-CM

## 2021-01-17 DIAGNOSIS — K08.89 DENTALGIA: Primary | ICD-10-CM

## 2021-01-17 DIAGNOSIS — K04.7 DENTAL INFECTION: ICD-10-CM

## 2021-01-17 PROCEDURE — 75810000283 HC INJECTION NERVE BLOCK

## 2021-01-17 PROCEDURE — 99281 EMR DPT VST MAYX REQ PHY/QHP: CPT

## 2021-01-17 RX ORDER — NAPROXEN 500 MG/1
500 TABLET ORAL 2 TIMES DAILY WITH MEALS
Qty: 20 TAB | Refills: 0 | Status: SHIPPED | OUTPATIENT
Start: 2021-01-17 | End: 2021-01-17 | Stop reason: SDUPTHER

## 2021-01-17 RX ORDER — NAPROXEN 500 MG/1
500 TABLET ORAL 2 TIMES DAILY WITH MEALS
Qty: 20 TAB | Refills: 0 | Status: SHIPPED | OUTPATIENT
Start: 2021-01-17 | End: 2021-03-18

## 2021-01-17 RX ORDER — LIDOCAINE HYDROCHLORIDE AND EPINEPHRINE 20; 5 MG/ML; UG/ML
1.5 INJECTION, SOLUTION EPIDURAL; INFILTRATION; INTRACAUDAL; PERINEURAL
Status: DISCONTINUED | OUTPATIENT
Start: 2021-01-17 | End: 2021-01-17 | Stop reason: HOSPADM

## 2021-01-17 RX ORDER — PENICILLIN V POTASSIUM 500 MG/1
500 TABLET, FILM COATED ORAL 4 TIMES DAILY
Qty: 28 TAB | Refills: 0 | Status: SHIPPED | OUTPATIENT
Start: 2021-01-17 | End: 2021-01-24

## 2021-01-17 RX ORDER — PENICILLIN V POTASSIUM 500 MG/1
500 TABLET, FILM COATED ORAL 4 TIMES DAILY
Qty: 28 TAB | Refills: 0 | Status: SHIPPED | OUTPATIENT
Start: 2021-01-17 | End: 2021-01-17 | Stop reason: SDUPTHER

## 2021-01-17 RX ORDER — BUPIVACAINE HYDROCHLORIDE 5 MG/ML
5 INJECTION, SOLUTION EPIDURAL; INTRACAUDAL
Status: DISCONTINUED | OUTPATIENT
Start: 2021-01-17 | End: 2021-01-17 | Stop reason: HOSPADM

## 2021-01-17 NOTE — DISCHARGE INSTRUCTIONS
Emergency [address] by [facility]  [address], [address]  Open M, W, F: 8AM - 5PM and T, Th: 8AM-6PM  Phone: [phone], press 4  $70 for Emergency Care  $60 for first routine care, then pay by sliding scale based upon income. [facility] [address] [place]  Phone: [phone]    [facility]  [address] [facility]  Open Monday - Friday 8AM - 4:30 PM  Phone: [phone] of Dentistry Urgent [address] [facility], [address] [facility], [facility] [facility] [address]   Phone: [phone] to confirm a time for emergency treatment   Pediatrics: [phone]   $75 per tooth, extractions only     [address] [facility], [address] [facility] [address], [address]nd Floor, [address] [place] [address] starting at 8:30 AM - 3 PM [address] [place] [address] [place] [place], [address] [address]   Phone [phone] or [phone]   Hours [id number] (extractions)   Simple tooth extraction: $60 per tooth, $55 per x-ray     [facility] Residents only, over the age of 25  Phone: [phone]. Leave message saying you need an appointment to register. Hours: Tuesday Evenings    [facility] (in [place])   [facility] AT [place] only   Phone: [phone], leave message saying you need an appointment to register   Hours: Wed 6-9p     Non-Urgent Kassandra Potts     A.D. [address] [facility] at [address] [facility]   Dental Clinic: [phone]   Oral Surgery Clinic: [phone]

## 2021-01-17 NOTE — LETTER
[facility] EMERGENCY DEPT 
[address] [facility] [place] [id number] [phone] Work/School Note Date: 1/17/2021 To Whom It May concern: 
 
Jennifer Gonzalez was seen and treated today in the emergency room by the following provider(s): 
Attending Provider: Hellen Painting MD.   
 
Jennifer Gonzalez may return to work on 1/19/21. Sincerely, Kristi Leon MD

## 2021-01-17 NOTE — ED PROVIDER NOTES
EMERGENCY DEPARTMENT HISTORY AND PHYSICAL EXAM      Please note that this dictation was completed with the assistance of \"Dragon\", the computer voice recognition software. Quite often unanticipated grammatical, syntax, homophones, and other interpretive errors are inadvertently transcribed by the computer software. Please disregard these errors and any errors that have escaped final proofreading. Thank you. Patient Name: Maria D Villarreal  : 1980  MRN: [id number]  History of Presenting Illness     Chief Complaint   Patient presents with    Dental Pain     reports filling fell out of tooth on left side. History Provided By: Patient    HPI: Maria D Villarreal, 36 y.o. female with past medical history as documented below presents to the ED with c/o of acute onset of moderate to severe left lower dental pain after her dental filling fell out earlier today. Pt describes pain as sharp, worse with hot and cold foods. She tried OTC medications without relief. She denies voice changes, SOB, dysphagia or facial swelling. Pt denies any other alleviating or exacerbating factors. Additionally, pt specifically denies any recent fever, chills, headache, nausea, vomiting, abdominal pain, CP, SOB, lightheadedness, dizziness, numbness, weakness, lower extremity swelling, heart palpitations, urinary sxs, diarrhea, constipation, melena, hematochezia, cough, or congestion. There are no other complaints, changes or physical findings pertinent to the HPI at this time.     PCP: Marian Lubin NP    Past History   Past Medical History:  Past Medical History:   Diagnosis Date    Arthritis     Asthma     bronchitis    Bronchitis     HTN, goal below 140/90 2012    Psychiatric disorder     bipolar       Past Surgical History:  Past Surgical History:   Procedure Laterality Date    DELIVERY       HX HEENT      4 wisdom teeth removed in 2011    HX TUBAL LIGATION      c section       Family History:  Family History   Problem Relation Age of Onset    Hypertension Father     Psychiatric Disorder Father         schzophrenia    Asthma Son     Asthma Son     Asthma Daughter        Social History:  Social History     Tobacco Use    Smoking status: Current Every Day Smoker     Packs/day: 1.00     Years: 12.00     Pack years: 12.00    Smokeless tobacco: Never Used   Substance Use Topics    Alcohol use: No    Drug use: No       Allergies: Allergies   Allergen Reactions    Codeine Nausea and Vomiting     Pt denies this allergy    Guaifenesin Nausea and Vomiting    Tessalon [Benzonatate] Other (comments)     Sneezing    Tramadol Rash       Current Medications:  No current facility-administered medications on file prior to encounter. Current Outpatient Medications on File Prior to Encounter   Medication Sig Dispense Refill    albuterol (ProAir HFA) 90 mcg/actuation inhaler Take 2 Puffs by inhalation every four (4) hours as needed for Wheezing. 1 Inhaler 0    topiramate (TOPAMAX) 50 mg tablet Take 50 mg by mouth daily.  ALPRAZOLAM PO Take  by mouth. Review of Systems   Review of Systems   Constitutional: Negative. Negative for chills and fever. HENT: Positive for dental problem. Negative for congestion, facial swelling, rhinorrhea, sore throat, trouble swallowing and voice change. Eyes: Negative. Respiratory: Negative. Negative for apnea, cough, chest tightness, shortness of breath and wheezing. Cardiovascular: Negative. Negative for chest pain, palpitations and leg swelling. Gastrointestinal: Negative. Negative for abdominal distention, abdominal pain, blood in stool, constipation, diarrhea, nausea and vomiting. Endocrine: Negative. Negative for cold intolerance, heat intolerance and polyuria. Genitourinary: Negative. Negative for difficulty urinating, dysuria, flank pain, frequency, hematuria and urgency. Musculoskeletal: Negative.   Negative for arthralgias, back pain, myalgias, neck pain and neck stiffness. Skin: Negative. Negative for color change and rash. Neurological: Negative. Negative for dizziness, syncope, facial asymmetry, speech difficulty, weakness, light-headedness, numbness and headaches. Hematological: Negative. Does not bruise/bleed easily. Psychiatric/Behavioral: Negative. Negative for confusion and self-injury. The patient is not nervous/anxious. Physical Exam   Physical Exam  Vitals signs and nursing note reviewed. Constitutional:       General: She is not in acute distress. Appearance: She is well-developed. She is not diaphoretic. HENT:      Head: Normocephalic and atraumatic. Comments: TTP tooth #19, surrounding induration noted, dental filling absent, no abscess noted. No facial abscess. Mouth/Throat:      Pharynx: No oropharyngeal exudate. Eyes:      Conjunctiva/sclera: Conjunctivae normal.      Pupils: Pupils are equal, round, and reactive to light. Neck:      Musculoskeletal: Normal range of motion. Cardiovascular:      Rate and Rhythm: Normal rate and regular rhythm. Heart sounds: Normal heart sounds. No murmur. No friction rub. No gallop. Pulmonary:      Effort: Pulmonary effort is normal. No respiratory distress. Breath sounds: Normal breath sounds. No wheezing or rales. Chest:      Chest wall: No tenderness. Abdominal:      General: Bowel sounds are normal. There is no distension. Palpations: Abdomen is soft. There is no mass. Tenderness: There is no abdominal tenderness. There is no guarding or rebound. Musculoskeletal: Normal range of motion. General: No tenderness or deformity. Skin:     General: Skin is warm. Findings: No rash. Neurological:      Mental Status: She is alert and oriented to person, place, and time. Cranial Nerves: No cranial nerve deficit. Motor: No abnormal muscle tone.       Coordination: Coordination normal.      Deep Tendon Reflexes: Reflexes normal.         Diagnostic Study Results     Labs -   I have personally reviewed and interpreted all laboratory results. No results found for this or any previous visit (from the past 24 hour(s)). Radiologic Studies -   I have personally reviewed and interpreted all imaging studies and agree with radiology interpretation and report. No orders to display     CT Results  (Last 48 hours)    None        CXR Results  (Last 48 hours)    None          Medical Decision Making   I reviewed the vital signs, available nursing notes, past medical history, past surgical history, family history and social history. Vital Signs-Reviewed the patient's vital signs. Patient Vitals for the past 24 hrs:   Temp Pulse Resp BP SpO2   01/17/21 0316 98.2 °F (36.8 °C) 82 16 (!) 152/90 100 %       Pulse Oximetry Analysis - 100% on RA    Cardiac Monitor:   Rate: 82 bpm  The cardiac monitor revealed the following rhythm as interpreted by me: Normal Sinus Rhythm     The cardiac monitor was ordered secondary to the patient's history of dental pain, procedure with dental block and to monitor the patient for dysrhythmia    Records Reviewed: Nursing Notes, Old Medical Records, Previous electrocardiograms, Previous Radiology Studies and Previous Laboratory Studies    Provider Notes (Medical Decision Making):   Patient presents with dental pain. Stable vitals and exam without obvious abscess that needs drainage. Airway stable and patient speaking in full sentences. No red flags that make PTA, RPA, ludwigs angina concerning. Will tx with dental nerve block, antibiotics and outpatient analgesics. Given information on dentists and importance of followup and no smoking. ED Course:   I am the first provider for this patient's ED visit today. Initial assessment performed. I discussed presenting problems, concerns and my formulated plan for today's visit with the patient and any available family members at bedside.  I encouraged them to ask questions as they arise throughout the visit. HYPERTENSION COUNSELING  For 10 minutes, education was provided to the patient today regarding their hypertension. Patient is made aware of their elevated blood pressure and is instructed to follow up this week with their Primary Care for a recheck. Patient is counseled regarding consequences of chronic, uncontrolled hypertension including kidney disease, heart disease, stroke or even death. Patient states their understanding and agrees to follow up this week. Additionally, during their visit, I discussed sodium restriction, maintaining ideal body weight and regular exercise program as physiologic means to achieve blood pressure control. The patient will strive towards this. TOBACCO COUNSELING:  Upon evaluation, pt expressed that they are a current tobacco user. For approximately 7 minutes, pt has been counseled on the dangers of smoking and was encouraged to quit as soon as possible in order to decrease further risks to their health. Pt has conveyed their understanding of the risks involved should they continue to use tobacco products. I reviewed our electronic medical record system for any past medical records that were available that may contribute to the patient's current condition, the nursing notes and vital signs from today's visit. Clyde Garzon MD    ED Orders Placed :  Orders Placed This Encounter    DENTAL BLOCK    bupivacaine (PF) (MARCAINE) 0.5 % (5 mg/mL) injection 25 mg    lidocaine-EPINEPHrine (XYLOCAINE) 1 %-1:100,000 injection 15 mg       ED Medications Administered:  Medications   bupivacaine (PF) (MARCAINE) 0.5 % (5 mg/mL) injection 25 mg (has no administration in time range)   lidocaine-EPINEPHrine (XYLOCAINE) 1 %-1:100,000 injection 15 mg (has no administration in time range)         Procedure Note - Dental Block:    3:32 AM  Performed by Gerardo Contreras MD.  A inferior alveolar nerve block was performed on the left side. 2 mL of 0.5% bupivicaine, 1% lidocaine and with epinephrine was injected. Total time at bedside, performing this procedure was 1-15 minutes. The procedure was tolerated well without any complications. Progress Note:  Patient has been reassessed and reports feeling better and symptoms have improved significantly after ED treatment. Lamar Steele's final labs and imaging have been reviewed with her and available family and/or caregiver. They have been counseled regarding her diagnosis. She verbally conveys understanding and agreement of the signs, symptoms, diagnosis, treatment and prognosis and additionally agrees to follow up as recommended with Dr. Aris James, NP and/or specialist in 24 - 48 hours. She also agrees with the care-plan we created together and conveys that all of her questions have been answered. I have also put together some discharge instructions for her that include: 1) educational information regarding their diagnosis, 2) how to care for their diagnosis at home, as well a 3) list of reasons why they would want to return to the ED prior to their follow-up appointment should the patient's condition change or symptoms worsen. I have answered all questions to the patient's satisfaction. Strict return precautions given. She both understood and agreed with plan as discussed. Vital signs stable for discharge. Pt very appreciative of care today. Disposition:   DISCHARGE  The pt is ready for discharge. The pt's signs, symptoms, diagnosis, and discharge instructions have been discussed and pt has conveyed their understanding. The pt is to follow up as recommended or return to ER should their symptoms worsen. Plan has been discussed and pt is in agreement. Plan:  1. Return precautions as discussed with patient and available family and/or caregiver.      2.   Discharge Medication List as of 1/17/2021  3:45 AM      CONTINUE these medications which have CHANGED    Details   naproxen (NAPROSYN) 500 mg tablet Take 1 Tab by mouth two (2) times daily (with meals). , Normal, Disp-20 Tab, R-0      penicillin v potassium (VEETID) 500 mg tablet Take 1 Tab by mouth four (4) times daily for 7 days. , Normal, Disp-28 Tab, R-0         CONTINUE these medications which have NOT CHANGED    Details   albuterol (ProAir HFA) 90 mcg/actuation inhaler Take 2 Puffs by inhalation every four (4) hours as needed for Wheezing., Normal, Disp-1 Inhaler,R-0      topiramate (TOPAMAX) 50 mg tablet Take 50 mg by mouth daily. , Historical Med      ALPRAZOLAM PO Take  by mouth., Historical Med           3. Follow-up Information     Follow up With Specialties Details Why Contact Info    [facility] EMERGENCY DEPT Emergency Medicine  As needed, If symptoms worsen [address] [place] [address]  [phone]          Instructed to return to ED if worse  Diagnosis   Clinical Impression:  1. Dentalgia    2. Dental infection    3. Accelerated hypertension    4. Tobacco abuse        Attestation:  Austin Anders MD, am the attending of record for this patient. I personally performed the services described in this documentation on this date, 1/17/2021 for patient, Anabel Tate. I have reviewed the chart and verified that the record is accurate and complete.

## 2021-02-19 ENCOUNTER — OFFICE VISIT (OUTPATIENT)
Dept: URGENT CARE | Age: 41
End: 2021-02-19
Payer: MEDICAID

## 2021-02-19 VITALS — TEMPERATURE: 98.2 F | RESPIRATION RATE: 18 BRPM | OXYGEN SATURATION: 100 % | HEART RATE: 100 BPM

## 2021-02-19 DIAGNOSIS — R05.9 COUGH: Primary | ICD-10-CM

## 2021-02-19 LAB — SARS-COV-2 POC: NEGATIVE

## 2021-02-19 PROCEDURE — 87426 SARSCOV CORONAVIRUS AG IA: CPT | Performed by: NURSE PRACTITIONER

## 2021-02-19 PROCEDURE — 99213 OFFICE O/P EST LOW 20 MIN: CPT | Performed by: NURSE PRACTITIONER

## 2021-02-19 NOTE — PROGRESS NOTES
This patient was seen at [address] [place] [address] Urgent Care while in their vehicle due to COVID-19 pandemic with PPE and focused examination in order to decrease community viral transmission. The patient/guardian gave verbal consent to treat. Lino Mcdonough is a 39 y.o. female who presents for COVID-19 testing. Patient reports she was exposed to COVID-19 by friend last week. Started with runny nose and cough 2-3 days ago. Denies any symptoms such as SOB, chest tightness, congestion, ST, HA, n/v/d, fever etc.No other complaints or concerns at this time. PMH: Asthma, HTN. Smoker. The history is provided by the patient.         Past Medical History:   Diagnosis Date    Arthritis     Asthma     bronchitis    Bronchitis     HTN, goal below 140/90 2012    Psychiatric disorder     bipolar        Past Surgical History:   Procedure Laterality Date    DELIVERY       HX HEENT      4 wisdom teeth removed in 2011    HX TUBAL LIGATION      c section         Family History   Problem Relation Age of Onset    Hypertension Father     Psychiatric Disorder Father         schzophrenia    Asthma Son     Asthma Son     Asthma Daughter         Social History     Socioeconomic History    Marital status: SINGLE     Spouse name: Not on file    Number of children: Not on file    Years of education: Not on file    Highest education level: Not on file   Occupational History    Not on file   Social Needs    Financial resource strain: Not on file    Food insecurity     Worry: Not on file     Inability: Not on file    Transportation needs     Medical: Not on file     Non-medical: Not on file   Tobacco Use    Smoking status: Current Every Day Smoker     Packs/day: 1.00     Years: 12.00     Pack years: 12.00    Smokeless tobacco: Never Used   Substance and Sexual Activity    Alcohol use: No    Drug use: No    Sexual activity: Yes     Partners: Male     Birth control/protection: Condom   Lifestyle  Physical activity     Days per week: Not on file     Minutes per session: Not on file    Stress: Not on file   Relationships    Social connections     Talks on phone: Not on file     Gets together: Not on file     Attends Amish service: Not on file     Active member of club or organization: Not on file     Attends meetings of clubs or organizations: Not on file     Relationship status: Not on file    Intimate partner violence     Fear of current or ex partner: Not on file     Emotionally abused: Not on file     Physically abused: Not on file     Forced sexual activity: Not on file   Other Topics Concern    Not on file   Social History Narrative    Not on file                ALLERGIES: Codeine, Guaifenesin, Tessalon [benzonatate], and Tramadol    Review of Systems   Constitutional: Negative for activity change, appetite change, chills, diaphoresis, fatigue and fever. HENT: Positive for rhinorrhea. Negative for congestion, ear pain, sinus pressure, sinus pain and sore throat. Respiratory: Positive for cough. Negative for chest tightness, shortness of breath and wheezing. Cardiovascular: Negative for chest pain. Gastrointestinal: Negative for abdominal pain, constipation, diarrhea, nausea and vomiting. Musculoskeletal: Negative for myalgias. Skin: Negative for rash. Neurological: Negative for dizziness, weakness, light-headedness and headaches. Vitals:    02/19/21 1729   Pulse: 100   Resp: 18   Temp: 98.2 °F (36.8 °C)   SpO2: 100%       Physical Exam  Vitals signs and nursing note reviewed. Constitutional:       General: She is not in acute distress. Appearance: Normal appearance. She is not ill-appearing. HENT:      Head: Normocephalic and atraumatic. Mouth/Throat:      Mouth: Mucous membranes are moist.   Eyes:      Conjunctiva/sclera: Conjunctivae normal.      Pupils: Pupils are equal, round, and reactive to light.    Neck:      Musculoskeletal: Normal range of motion and neck supple. No muscular tenderness. Cardiovascular:      Rate and Rhythm: Normal rate and regular rhythm. Heart sounds: Normal heart sounds. No murmur. Pulmonary:      Effort: Pulmonary effort is normal.      Breath sounds: Normal breath sounds. No wheezing or rhonchi. Musculoskeletal: Normal range of motion. Lymphadenopathy:      Cervical: No cervical adenopathy. Skin:     General: Skin is warm and dry. Findings: No rash. Neurological:      Mental Status: She is alert and oriented to person, place, and time. Psychiatric:         Mood and Affect: Mood normal.         Behavior: Behavior normal.         MDM    Procedures      ICD-10-CM ICD-9-CM   1. Cough  R05 786.2       Orders Placed This Encounter    NOVEL CORONAVIRUS (COVID-19) ([facility] Default)     Scheduling Instructions:      1) Due to current limited availability of the COVID-19 PCR test, tests will be prioritized and may not be completed.              2) Order only if the test result will change clinical management or necessary for a return to mission-critical employment decision.              3) Print and instruct patient to adhere to CDC home isolation program. (Link Above)              4) Set up or refer patient for a monitoring program.              5) Have patient sign up for and leverage boarding passhart (if not previously done). Order Specific Question:   Is this test for diagnosis or screening? Answer:   Diagnosis of ill patient     Order Specific Question:   Symptomatic for COVID-19 as defined by CDC? Answer:   Yes     Order Specific Question:   Date of Symptom Onset     Answer:   2/16/2021     Order Specific Question:   Hospitalized for COVID-19? Answer:   No     Order Specific Question:   Admitted to ICU for COVID-19? Answer:   No     Order Specific Question:   Employed in healthcare setting? Answer:   Unknown     Order Specific Question:   Resident in a congregate (group) care setting?      Answer: Unknown     Order Specific Question:   Pregnant? Answer:   Unknown     Order Specific Question:   Previously tested for COVID-19? Answer: Yes    AMB POC SARS-COV-2     Order Specific Question:   Is this test for diagnosis or screening? Answer:   Diagnosis of ill patient     Order Specific Question:   Symptomatic for COVID-19 as defined by CDC? Answer:   Yes     Order Specific Question:   Date of Symptom Onset     Answer:   2/16/2021     Order Specific Question:   Hospitalized for COVID-19? Answer:   No     Order Specific Question:   Admitted to ICU for COVID-19? Answer:   No     Order Specific Question:   Employed in healthcare setting? Answer:   Unknown     Order Specific Question:   Resident in a congregate (group) care setting? Answer:   Unknown     Order Specific Question:   Pregnant? Answer:   Unknown     Order Specific Question:   Previously tested for COVID-19? Answer:   Yes      Results for orders placed or performed in visit on 02/19/21   AMB POC SARS-COV-2   Result Value Ref Range    SARS-COV-2 POC Negative Negative       Quarantine recommendations reviewed per CDC guidelines. Encouraged deep breathing exercises, ambulation, Tylenol prn- should symptoms develop. Increase fluids with electrolytes    The patient is to follow up with PCP PRN. If signs and symptoms become worse the pt is to go to the ER.        Signed By: Fermin Turk NP     February 19, 2021

## 2021-02-21 LAB — SARS-COV-2, NAA: NOT DETECTED

## 2021-02-27 ENCOUNTER — OFFICE VISIT (OUTPATIENT)
Dept: URGENT CARE | Age: 41
End: 2021-02-27
Payer: MEDICAID

## 2021-02-27 VITALS — TEMPERATURE: 99 F | RESPIRATION RATE: 16 BRPM | HEART RATE: 80 BPM | OXYGEN SATURATION: 98 %

## 2021-02-27 DIAGNOSIS — Z20.822 EXPOSURE TO COVID-19 VIRUS: ICD-10-CM

## 2021-02-27 DIAGNOSIS — R51.9 NONINTRACTABLE HEADACHE, UNSPECIFIED CHRONICITY PATTERN, UNSPECIFIED HEADACHE TYPE: Primary | ICD-10-CM

## 2021-02-27 LAB — SARS-COV-2 POC: NEGATIVE

## 2021-02-27 PROCEDURE — 99212 OFFICE O/P EST SF 10 MIN: CPT | Performed by: NURSE PRACTITIONER

## 2021-02-27 PROCEDURE — 87426 SARSCOV CORONAVIRUS AG IA: CPT | Performed by: NURSE PRACTITIONER

## 2021-02-27 NOTE — LETTER
NOTIFICATION RETURN TO WORK / SCHOOL 
 
2/27/2021 11:45 AM 
 
Ms. Madhu Florez [address] [id number] To Whom It May Concern: 
 
Madhu Florez is currently under the care of [address]. Was re- tested for covid 19 today. If there are questions or concerns please have the patient contact our office. Sincerely, GHE PROVIDER

## 2021-03-18 ENCOUNTER — HOSPITAL ENCOUNTER (EMERGENCY)
Age: 41
Discharge: HOME | End: 2021-03-18
Attending: EMERGENCY MEDICINE
Payer: MEDICAID

## 2021-03-18 ENCOUNTER — APPOINTMENT (OUTPATIENT)
Dept: GENERAL RADIOLOGY | Age: 41
End: 2021-03-18
Attending: EMERGENCY MEDICINE
Payer: MEDICAID

## 2021-03-18 VITALS
RESPIRATION RATE: 118 BRPM | SYSTOLIC BLOOD PRESSURE: 145 MMHG | HEART RATE: 72 BPM | DIASTOLIC BLOOD PRESSURE: 92 MMHG | OXYGEN SATURATION: 100 % | TEMPERATURE: 98 F

## 2021-03-18 DIAGNOSIS — R20.0 HAND NUMBNESS: ICD-10-CM

## 2021-03-18 DIAGNOSIS — R07.9 ACUTE CHEST PAIN: ICD-10-CM

## 2021-03-18 DIAGNOSIS — R03.0 ELEVATED BLOOD PRESSURE READING: Primary | ICD-10-CM

## 2021-03-18 DIAGNOSIS — R06.02 SOB (SHORTNESS OF BREATH): ICD-10-CM

## 2021-03-18 LAB
ALBUMIN SERPL-MCNC: 3.2 G/DL (ref 3.5–5)
ALBUMIN/GLOB SERPL: 0.9 {RATIO} (ref 1.1–2.2)
ALP SERPL-CCNC: 78 U/L (ref 45–117)
ALT SERPL-CCNC: 22 U/L (ref 12–78)
ANION GAP SERPL CALC-SCNC: 2 MMOL/L (ref 5–15)
AST SERPL-CCNC: 14 U/L (ref 15–37)
ATRIAL RATE: 64 BPM
BASOPHILS # BLD: 0.1 K/UL (ref 0–0.1)
BASOPHILS NFR BLD: 1 % (ref 0–1)
BILIRUB SERPL-MCNC: 0.3 MG/DL (ref 0.2–1)
BUN SERPL-MCNC: 9 MG/DL (ref 6–20)
BUN/CREAT SERPL: 13 (ref 12–20)
CALCIUM SERPL-MCNC: 8.4 MG/DL (ref 8.5–10.1)
CALCULATED P AXIS, ECG09: 50 DEGREES
CALCULATED R AXIS, ECG10: 33 DEGREES
CALCULATED T AXIS, ECG11: 4 DEGREES
CHLORIDE SERPL-SCNC: 109 MMOL/L (ref 97–108)
CO2 SERPL-SCNC: 29 MMOL/L (ref 21–32)
COMMENT, HOLDF: NORMAL
CREAT SERPL-MCNC: 0.72 MG/DL (ref 0.55–1.02)
DIAGNOSIS, 93000: NORMAL
DIFFERENTIAL METHOD BLD: NORMAL
EOSINOPHIL # BLD: 0.1 K/UL (ref 0–0.4)
EOSINOPHIL NFR BLD: 2 % (ref 0–7)
ERYTHROCYTE [DISTWIDTH] IN BLOOD BY AUTOMATED COUNT: 13.2 % (ref 11.5–14.5)
GLOBULIN SER CALC-MCNC: 3.6 G/DL (ref 2–4)
GLUCOSE SERPL-MCNC: 95 MG/DL (ref 65–100)
HCT VFR BLD AUTO: 37.8 % (ref 35–47)
HGB BLD-MCNC: 11.9 G/DL (ref 11.5–16)
IMM GRANULOCYTES # BLD AUTO: 0 K/UL (ref 0–0.04)
IMM GRANULOCYTES NFR BLD AUTO: 0 % (ref 0–0.5)
LYMPHOCYTES # BLD: 2.8 K/UL (ref 0.8–3.5)
LYMPHOCYTES NFR BLD: 36 % (ref 12–49)
MCH RBC QN AUTO: 29.4 PG (ref 26–34)
MCHC RBC AUTO-ENTMCNC: 31.5 G/DL (ref 30–36.5)
MCV RBC AUTO: 93.3 FL (ref 80–99)
MONOCYTES # BLD: 0.5 K/UL (ref 0–1)
MONOCYTES NFR BLD: 6 % (ref 5–13)
NEUTS SEG # BLD: 4.3 K/UL (ref 1.8–8)
NEUTS SEG NFR BLD: 55 % (ref 32–75)
NRBC # BLD: 0 K/UL (ref 0–0.01)
NRBC BLD-RTO: 0 PER 100 WBC
P-R INTERVAL, ECG05: 154 MS
PLATELET # BLD AUTO: 350 K/UL (ref 150–400)
PMV BLD AUTO: 9.4 FL (ref 8.9–12.9)
POTASSIUM SERPL-SCNC: 4 MMOL/L (ref 3.5–5.1)
PROT SERPL-MCNC: 6.8 G/DL (ref 6.4–8.2)
Q-T INTERVAL, ECG07: 410 MS
QRS DURATION, ECG06: 88 MS
QTC CALCULATION (BEZET), ECG08: 422 MS
RBC # BLD AUTO: 4.05 M/UL (ref 3.8–5.2)
SAMPLES BEING HELD,HOLD: NORMAL
SODIUM SERPL-SCNC: 140 MMOL/L (ref 136–145)
VENTRICULAR RATE, ECG03: 64 BPM
WBC # BLD AUTO: 7.7 K/UL (ref 3.6–11)

## 2021-03-18 PROCEDURE — 36415 COLL VENOUS BLD VENIPUNCTURE: CPT

## 2021-03-18 PROCEDURE — 71046 X-RAY EXAM CHEST 2 VIEWS: CPT

## 2021-03-18 PROCEDURE — 93005 ELECTROCARDIOGRAM TRACING: CPT

## 2021-03-18 PROCEDURE — 80053 COMPREHEN METABOLIC PANEL: CPT

## 2021-03-18 PROCEDURE — 99284 EMERGENCY DEPT VISIT MOD MDM: CPT

## 2021-03-18 PROCEDURE — 85025 COMPLETE CBC W/AUTO DIFF WBC: CPT

## 2021-03-18 NOTE — LETTER
Noa Brown 55 
[address] [facility] [address] [id number] [phone] Work/School Note Date: 3/18/2021 To Whom It May concern: 
 
Sailna Delatorre was seen and treated today in the emergency room by the following provider(s): 
Attending Provider: Hipolito Fernández MD.   
 
Salina Delatorre may return to work on 3-19-21. Sincerely, Brandan Gonzalez MD

## 2021-03-18 NOTE — ED PROVIDER NOTES
HPI.  Patient has a history of bipolar disorder, hypertension, asthma, cigarette smoking, obesity and arthritis. Her blood pressure was doing quite well and her blood pressure medications were discontinued approximately 5 months ago. Patient has a lot of different symptoms. She woke up with her right hand tingling and it took a few minutes to resolve. Patient has some mild vague headache at present. She feels a little bit tired and short of breath when talking. She shovels tobacco at work and has had no problem with shortness of breath at work. Patient had some brief sharp chest pain several days ago right upper sternal border. Patient has not had a problem walking and talking.     Past Medical History:   Diagnosis Date    Arthritis     Asthma     bronchitis    Bronchitis     HTN, goal below 140/90 2012    Psychiatric disorder     bipolar       Past Surgical History:   Procedure Laterality Date    DELIVERY       HX HEENT      4 wisdom teeth removed in 2011    HX TUBAL LIGATION      c section         Family History:   Problem Relation Age of Onset    Hypertension Father     Psychiatric Disorder Father         schzophrenia    Asthma Son     Asthma Son     Asthma Daughter        Social History     Socioeconomic History    Marital status: SINGLE     Spouse name: Not on file    Number of children: Not on file    Years of education: Not on file    Highest education level: Not on file   Occupational History    Not on file   Social Needs    Financial resource strain: Not on file    Food insecurity     Worry: Not on file     Inability: Not on file    Transportation needs     Medical: Not on file     Non-medical: Not on file   Tobacco Use    Smoking status: Current Every Day Smoker     Packs/day: 1.00     Years: 12.00     Pack years: 12.00    Smokeless tobacco: Never Used   Substance and Sexual Activity    Alcohol use: No    Drug use: No    Sexual activity: Yes     Partners: Male     Birth control/protection: Condom   Lifestyle    Physical activity     Days per week: Not on file     Minutes per session: Not on file    Stress: Not on file   Relationships    Social connections     Talks on phone: Not on file     Gets together: Not on file     Attends Muslim service: Not on file     Active member of club or organization: Not on file     Attends meetings of clubs or organizations: Not on file     Relationship status: Not on file    Intimate partner violence     Fear of current or ex partner: Not on file     Emotionally abused: Not on file     Physically abused: Not on file     Forced sexual activity: Not on file   Other Topics Concern    Not on file   Social History Narrative    Not on file         ALLERGIES: Codeine, Guaifenesin, Tessalon [benzonatate], and Tramadol    Review of Systems   Constitutional: Negative for fever. HENT: Negative for congestion. Eyes: Negative for redness. Respiratory: Negative for cough. Cardiovascular: Negative for leg swelling. Gastrointestinal: Negative for abdominal pain. Genitourinary: Negative for difficulty urinating. Neurological: Negative for syncope. Psychiatric/Behavioral: Negative for agitation and confusion. Vitals:    03/18/21 1027   BP: (!) 173/108   Pulse: 70   Resp: 18   Temp: 98.3 °F (36.8 °C)   SpO2: 99%            Physical Exam  Vitals signs and nursing note reviewed. Constitutional:       Appearance: She is well-developed. She is obese. HENT:      Head: Normocephalic and atraumatic. Eyes:      Pupils: Pupils are equal, round, and reactive to light. Neck:      Musculoskeletal: Normal range of motion and neck supple. Cardiovascular:      Rate and Rhythm: Normal rate and regular rhythm. Heart sounds: Normal heart sounds. No murmur. No friction rub. No gallop. Pulmonary:      Effort: Pulmonary effort is normal. No respiratory distress. Breath sounds: No wheezing or rales.    Abdominal: Palpations: Abdomen is soft. Tenderness: There is no abdominal tenderness. There is no rebound. Musculoskeletal: Normal range of motion. General: No tenderness. Skin:     Findings: No erythema. Neurological:      Mental Status: She is alert. Cranial Nerves: No cranial nerve deficit. Comments: Motor; symmetric   Psychiatric:         Behavior: Behavior normal.          MDM       Procedures    ED EKG interpretation:  Rhythm: normal sinus rhythm; and regular . Rate (approx.): 65; Axis: normal; P wave: normal; QRS interval: normal ; ST/T wave: normal; in  Lead: ; Other findings: . This EKG was interpreted by Isabelle Monet MD,ED Provider. 12:25 PM        Note: Blood pressure repeat twice in the ER very close to normal off blood pressure medications. Starting blood pressure medication at this time is not indicated. Hand numbness sounds like it came from peripheral nerve compression. Right parasternal pain several days ago sounds like musculoskeletal pain. Patient has longstanding shortness of breath with exertion but does relatively physical work at [employer] without it bothering her. She is trying to stop smoking. Patient will be discharged.   Isabelle Monet MD  12:28 PM

## 2021-03-18 NOTE — ED TRIAGE NOTES
Pt arrives to ED reporting she was sent for psychiatrist office for high blood pressure. Pt used to take BP meds but it was discontinued by provider last year. Pt denies pain.

## 2021-11-04 ENCOUNTER — OFFICE VISIT (OUTPATIENT)
Dept: URGENT CARE | Age: 41
End: 2021-11-04
Payer: MEDICAID

## 2021-11-04 VITALS — RESPIRATION RATE: 12 BRPM | OXYGEN SATURATION: 96 % | HEART RATE: 73 BPM | TEMPERATURE: 98.7 F

## 2021-11-04 DIAGNOSIS — Z20.822 EXPOSURE TO COVID-19 VIRUS: Primary | ICD-10-CM

## 2021-11-04 PROCEDURE — 99212 OFFICE O/P EST SF 10 MIN: CPT | Performed by: FAMILY MEDICINE

## 2021-11-04 NOTE — PROGRESS NOTES
This patient was seen at [address] [place] [address] Urgent Care while in their vehicle due to COVID-19 pandemic with PPE and focused examination in order to decrease community viral transmission. The patient/guardian gave verbal consent to treat. Tom Amanda is a 39 y.o. female who presents for COVID-19 testing. Was exposed to COVID-19 by co-worker 5-6 days ago. Denies cough, fever, SOB, N/V. Has had intermittent diarrhea and HA x 4-5 days. Eating/drinking well. Had negative rapid yesterday but works wants a PCR test.      The history is provided by the patient.         Past Medical History:   Diagnosis Date    Arthritis     Asthma     bronchitis    Bronchitis     HTN, goal below 140/90 2012    Psychiatric disorder     bipolar        Past Surgical History:   Procedure Laterality Date    DELIVERY       HX HEENT      4 wisdom teeth removed in 2011    HX TUBAL LIGATION      c section         Family History   Problem Relation Age of Onset    Hypertension Father     Psychiatric Disorder Father         schzophrenia    Asthma Son     Asthma Son     Asthma Daughter         Social History     Socioeconomic History    Marital status: SINGLE     Spouse name: Not on file    Number of children: Not on file    Years of education: Not on file    Highest education level: Not on file   Occupational History    Not on file   Tobacco Use    Smoking status: Current Every Day Smoker     Packs/day: 1.00     Years: 12.00     Pack years: 12.00    Smokeless tobacco: Never Used   Substance and Sexual Activity    Alcohol use: No    Drug use: No    Sexual activity: Yes     Partners: Male     Birth control/protection: Condom   Other Topics Concern    Not on file   Social History Narrative    Not on file     Social Determinants of Health     Financial Resource Strain:     Difficulty of Paying Living Expenses: Not on file   Food Insecurity:     Worried About Running Out of Food in the Last Year: Not on file    [address] in the Last Year: Not on file   Transportation Needs:     Lack of Transportation (Medical): Not on file    Lack of Transportation (Non-Medical): Not on file   Physical Activity:     Days of Exercise per Week: Not on file    Minutes of Exercise per Session: Not on file   Stress:     Feeling of Stress : Not on file   Social Connections:     Frequency of Communication with Friends and Family: Not on file    Frequency of Social Gatherings with Friends and Family: Not on file    Attends Buddhism Services: Not on file    Active Member of [address] [place] [address] [employer] or Organizations: Not on file    Attends Club or Organization Meetings: Not on file    Marital Status: Not on file   Intimate Partner Violence:     Fear of Current or Ex-Partner: Not on file    Emotionally Abused: Not on file    Physically Abused: Not on file    Sexually Abused: Not on file   Housing Stability:     Unable to Pay for Housing in the Last Year: Not on file    Number of [place] in the Last Year: Not on file    Unstable Housing in the Last Year: Not on file                ALLERGIES: Codeine, Guaifenesin, Tessalon [benzonatate], and Tramadol    Review of Systems   Constitutional: Negative for activity change, appetite change and fever. Respiratory: Negative for cough and shortness of breath. Gastrointestinal: Positive for diarrhea. Negative for nausea and vomiting. Neurological: Positive for headaches. Vitals:    11/04/21 1030   Pulse: 73   Resp: 12   Temp: 98.7 °F (37.1 °C)   SpO2: 96%       Physical Exam  Vitals and nursing note reviewed. Constitutional:       General: She is not in acute distress. Appearance: She is well-developed. She is not diaphoretic. Pulmonary:      Effort: Pulmonary effort is normal.   Neurological:      Mental Status: She is alert. Psychiatric:         Behavior: Behavior normal.         Thought Content:  Thought content normal.         Judgment: Judgment normal. MDM    ICD-10-CM ICD-9-CM   1. Exposure to COVID-19 virus  Z20.822 V01.79       Orders Placed This Encounter    NOVEL CORONAVIRUS (COVID-19)     Scheduling Instructions:      1) Due to current limited availability of the COVID-19 PCR test, tests will be prioritized and may not be completed.              2) Order only if the test result will change clinical management or necessary for a return to mission-critical employment decision.              3) Print and instruct patient to adhere to CDC home isolation program. (Link Above)              4) Set up or refer patient for a monitoring program.              5) Have patient sign up for and leverage MyChart (if not previously done). Order Specific Question:   Is this test for diagnosis or screening? Answer:   Screening     Order Specific Question:   Symptomatic for COVID-19 as defined by CDC? Answer:   No     Order Specific Question:   Hospitalized for COVID-19? Answer:   No     Order Specific Question:   Admitted to ICU for COVID-19? Answer:   No     Order Specific Question:   Employed in healthcare setting? Answer:   Unknown     Order Specific Question:   Resident in a congregate (group) care setting? Answer:   No     Order Specific Question:   Pregnant? Answer:   No     Order Specific Question:   Previously tested for COVID-19? Answer:   Yes        Quarantine, await PCR results  Deep breathing exercises, ambulation  Tylenol prn  Increase fluids with electrolytes    MyChart: active  Provider will call if PCR COVID-19 test is positive     If signs and symptoms become worse the pt is to go to the ER.          Procedures

## 2021-11-06 LAB
SARS-COV-2, NAA 2 DAY TAT: NORMAL
SARS-COV-2, NAA: NOT DETECTED

## 2022-08-06 VITALS
HEIGHT: 66 IN | OXYGEN SATURATION: 97 % | HEART RATE: 85 BPM | RESPIRATION RATE: 20 BRPM | DIASTOLIC BLOOD PRESSURE: 95 MMHG | TEMPERATURE: 99.4 F | WEIGHT: 293 LBS | BODY MASS INDEX: 47.09 KG/M2 | SYSTOLIC BLOOD PRESSURE: 151 MMHG

## 2022-08-06 PROCEDURE — 75810000275 HC EMERGENCY DEPT VISIT NO LEVEL OF CARE

## 2022-08-07 ENCOUNTER — HOSPITAL ENCOUNTER (EMERGENCY)
Age: 42
Discharge: LEFT BEFORE DISCHARGE | End: 2022-08-07
Payer: MEDICAID

## 2022-08-07 NOTE — ED TRIAGE NOTES
Patient to ED for chronic R knee pain with increased pain x2 weeks and worsened x2days. PAtient states she injured R knee 2/2022 after slipping on spilled liquid in a grocery store. Intermittent pain to R knee since injury. Patient reporting feeling like her knee is going to give out upon ambulation. Patient ambulated to triage w/o assistance and mild limp.

## 2023-01-19 NOTE — DISCHARGE INSTRUCTIONS
Skin Abscess: Care Instructions  Your Care Instructions    A skin abscess is a bacterial infection that forms a pocket of pus. A boil is a kind of skin abscess. The doctor may have cut an opening in the abscess so that the pus can drain out. You may have gauze in the cut so that the abscess will stay open and keep draining. You may need antibiotics. You will need to follow up with your doctor to make sure the infection has gone away. The doctor has checked you carefully, but problems can develop later. If you notice any problems or new symptoms, get medical treatment right away. Follow-up care is a key part of your treatment and safety. Be sure to make and go to all appointments, and call your doctor if you are having problems. It's also a good idea to know your test results and keep a list of the medicines you take. How can you care for yourself at home? · Apply warm and dry compresses, a heating pad set on low, or a hot water bottle 3 or 4 times a day for pain. Keep a cloth between the heat source and your skin. · If your doctor prescribed antibiotics, take them as directed. Do not stop taking them just because you feel better. You need to take the full course of antibiotics. · Take pain medicines exactly as directed. ¨ If the doctor gave you a prescription medicine for pain, take it as prescribed. ¨ If you are not taking a prescription pain medicine, ask your doctor if you can take an over-the-counter medicine. · Keep your bandage clean and dry. Change the bandage whenever it gets wet or dirty, or at least one time a day. · If the abscess was packed with gauze:  ¨ Keep follow-up appointments to have the gauze changed or removed. If the doctor instructed you to remove the gauze, gently pull out all of the gauze when your doctor tells you to. ¨ After the gauze is removed, soak the area in warm water for 15 to 20 minutes 2 times a day, until the wound closes. When should you call for help?   Call your doctor now or seek immediate medical care if:  ? · You have signs of worsening infection, such as:  ¨ Increased pain, swelling, warmth, or redness. ¨ Red streaks leading from the infected skin. ¨ Pus draining from the wound. ¨ A fever. ? Watch closely for changes in your health, and be sure to contact your doctor if:  ? · You do not get better as expected. Where can you learn more? Go to http://joao-ladi.info/. Enter C359 in the search box to learn more about \"Skin Abscess: Care Instructions. \"  Current as of: October 13, 2016  Content Version: 11.4  © 4453-5655 [employer]. Care instructions adapted under license by [employer] (which disclaims liability or warranty for this information). If you have questions about a medical condition or this instruction, always ask your healthcare professional. [place] [address] any warranty or liability for your use of this information. Cellulitis: Care Instructions  Your Care Instructions    Cellulitis is a skin infection. It often occurs after a break in the skin from a scrape, cut, bite, or puncture, or after a rash. The doctor has checked you carefully, but problems can develop later. If you notice any problems or new symptoms, get medical treatment right away. Follow-up care is a key part of your treatment and safety. Be sure to make and go to all appointments, and call your doctor if you are having problems. It's also a good idea to know your test results and keep a list of the medicines you take. How can you care for yourself at home? · Take your antibiotics as directed. Do not stop taking them just because you feel better. You need to take the full course of antibiotics. · Prop up the infected area on pillows to reduce pain and swelling. Try to keep the area above the level of your heart as often as you can.   · If your doctor told you how to care for your wound, follow your doctor's instructions. If you did not get instructions, follow this general advice:  ¨ Wash the wound with clean water 2 times a day. Don't use hydrogen peroxide or alcohol, which can slow healing. ¨ You may cover the wound with a thin layer of petroleum jelly, such as Vaseline, and a nonstick bandage. ¨ Apply more petroleum jelly and replace the bandage as needed. · Be safe with medicines. Take pain medicines exactly as directed. ¨ If the doctor gave you a prescription medicine for pain, take it as prescribed. ¨ If you are not taking a prescription pain medicine, ask your doctor if you can take an over-the-counter medicine. To prevent cellulitis in the future  · Try to prevent cuts, scrapes, or other injuries to your skin. Cellulitis most often occurs where there is a break in the skin. · If you get a scrape, cut, mild burn, or bite, wash the wound with clean water as soon as you can to help avoid infection. Don't use hydrogen peroxide or alcohol, which can slow healing. · If you have swelling in your legs (edema), support stockings and good skin care may help prevent leg sores and cellulitis. · Take care of your feet, especially if you have diabetes or other conditions that increase the risk of infection. Wear shoes and socks. Do not go barefoot. If you have athlete's foot or other skin problems on your feet, talk to your doctor about how to treat them. When should you call for help? Call your doctor now or seek immediate medical care if:  ? · You have signs that your infection is getting worse, such as:  ¨ Increased pain, swelling, warmth, or redness. ¨ Red streaks leading from the area. ¨ Pus draining from the area. ¨ A fever. ? · You get a rash. ? Watch closely for changes in your health, and be sure to contact your doctor if:  ? · You are not getting better after 1 day (24 hours). ? · You do not get better as expected. Where can you learn more?   Go to http://joao-ladi.info/. Mario Alberto Marroquin in the search box to learn more about \"Cellulitis: Care Instructions. \"  Current as of: October 13, 2016  Content Version: 11.4  © 4575-0016 [employer], [facility]. Care instructions adapted under license by [employer] (which disclaims liability or warranty for this information). If you have questions about a medical condition or this instruction, always ask your healthcare professional. [place] [address] any warranty or liability for your use of this information. (M6) obeys commands

## 2023-07-30 ENCOUNTER — APPOINTMENT (OUTPATIENT)
Facility: HOSPITAL | Age: 43
End: 2023-07-30
Payer: MEDICAID

## 2023-07-30 ENCOUNTER — HOSPITAL ENCOUNTER (EMERGENCY)
Facility: HOSPITAL | Age: 43
Discharge: HOME | End: 2023-07-30
Attending: EMERGENCY MEDICINE
Payer: MEDICAID

## 2023-07-30 VITALS
HEIGHT: 66 IN | HEART RATE: 71 BPM | BODY MASS INDEX: 42.66 KG/M2 | SYSTOLIC BLOOD PRESSURE: 153 MMHG | OXYGEN SATURATION: 99 % | WEIGHT: 265.43 LBS | TEMPERATURE: 98.4 F | RESPIRATION RATE: 18 BRPM | DIASTOLIC BLOOD PRESSURE: 115 MMHG

## 2023-07-30 DIAGNOSIS — S93.402A SPRAIN OF LEFT ANKLE, UNSPECIFIED LIGAMENT, INITIAL ENCOUNTER: Primary | ICD-10-CM

## 2023-07-30 DIAGNOSIS — M77.32 CALCANEAL SPUR OF LEFT FOOT: ICD-10-CM

## 2023-07-30 DIAGNOSIS — S93.602A SPRAIN OF LEFT FOOT, INITIAL ENCOUNTER: ICD-10-CM

## 2023-07-30 PROCEDURE — 73630 X-RAY EXAM OF FOOT: CPT

## 2023-07-30 PROCEDURE — 73610 X-RAY EXAM OF ANKLE: CPT

## 2023-07-30 PROCEDURE — 99283 EMERGENCY DEPT VISIT LOW MDM: CPT

## 2023-07-30 RX ORDER — HYDROCODONE BITARTRATE AND ACETAMINOPHEN 5; 325 MG/1; MG/1
1 TABLET ORAL EVERY 6 HOURS PRN
Qty: 12 TABLET | Refills: 0 | Status: SHIPPED | OUTPATIENT
Start: 2023-07-30 | End: 2023-08-02

## 2023-07-30 ASSESSMENT — PAIN SCALES - GENERAL
PAINLEVEL_OUTOF10: 10

## 2023-07-30 ASSESSMENT — PAIN - FUNCTIONAL ASSESSMENT
PAIN_FUNCTIONAL_ASSESSMENT: 0-10
PAIN_FUNCTIONAL_ASSESSMENT: 0-10

## 2023-08-05 ENCOUNTER — APPOINTMENT (OUTPATIENT)
Facility: HOSPITAL | Age: 43
End: 2023-08-05
Payer: MEDICAID

## 2023-08-05 ENCOUNTER — HOSPITAL ENCOUNTER (EMERGENCY)
Facility: HOSPITAL | Age: 43
Discharge: HOME | End: 2023-08-05
Attending: EMERGENCY MEDICINE
Payer: MEDICAID

## 2023-08-05 VITALS
TEMPERATURE: 99.9 F | DIASTOLIC BLOOD PRESSURE: 92 MMHG | RESPIRATION RATE: 20 BRPM | OXYGEN SATURATION: 98 % | SYSTOLIC BLOOD PRESSURE: 136 MMHG | HEART RATE: 92 BPM

## 2023-08-05 DIAGNOSIS — M77.32 CALCANEAL SPUR OF LEFT FOOT: Primary | ICD-10-CM

## 2023-08-05 DIAGNOSIS — M25.562 ACUTE PAIN OF LEFT KNEE: ICD-10-CM

## 2023-08-05 DIAGNOSIS — M19.072 OSTEOARTHRITIS OF LEFT ANKLE, UNSPECIFIED OSTEOARTHRITIS TYPE: ICD-10-CM

## 2023-08-05 PROCEDURE — 99283 EMERGENCY DEPT VISIT LOW MDM: CPT

## 2023-08-05 PROCEDURE — 73562 X-RAY EXAM OF KNEE 3: CPT

## 2023-08-05 PROCEDURE — 6370000000 HC RX 637 (ALT 250 FOR IP)

## 2023-08-05 PROCEDURE — 73610 X-RAY EXAM OF ANKLE: CPT

## 2023-08-05 RX ORDER — NAPROXEN 500 MG/1
500 TABLET ORAL 2 TIMES DAILY
Qty: 30 TABLET | Refills: 0 | Status: SHIPPED | OUTPATIENT
Start: 2023-08-05

## 2023-08-05 RX ADMIN — DICLOFENAC SODIUM 4 G: 10 GEL TOPICAL at 17:59

## 2023-08-05 ASSESSMENT — LIFESTYLE VARIABLES
HOW MANY STANDARD DRINKS CONTAINING ALCOHOL DO YOU HAVE ON A TYPICAL DAY: 1 OR 2
HOW OFTEN DO YOU HAVE A DRINK CONTAINING ALCOHOL: MONTHLY OR LESS

## 2023-08-05 ASSESSMENT — PAIN SCALES - GENERAL: PAINLEVEL_OUTOF10: 10

## 2023-08-05 NOTE — ED NOTES
ACE wrap placed on L ankle and L knee. DC info reviewed with patient, all questions answered. Patient well-appearing at time of discharge and vital signs stable. Ambulatory out of ED at this time.        Fred Verdin RN  08/05/23 [id number]

## 2023-08-06 NOTE — ED PROVIDER NOTES
[facility] EMERGENCY DEPT  EMERGENCY DEPARTMENT ENCOUNTER       Pt Name: Fariba Galaeno  MRN: [id number]  [address] [facility] 1980  Date of Evaluation: 2023  Provider: LISA Sung NP   PCP: LISA Rai NP  Note Started: 1:10 AM 23     CHIEF COMPLAINT       Chief Complaint   Patient presents with    Leg Pain     Patient is ambulatory to triage with cc of lower left leg/ankle pain after slipping on water in her apartment yesterday         HISTORY OF PRESENT ILLNESS: 1 or more elements      History From: Patient  HPI Limitations None     Sirisha Swift is a 37 y.o. female with history of arthritis, asthma, bronchitis, and bipolar who presents to the ED today for complaints of left knee and left ankle pain after slipping and falling in her apartment yesterday. Already took Tylenol and ibuprofen prior to arrival.  Pain is a 10 out of 10 when she moves. Denies numbness, tingling. Did not hear/feel a crack or pop. Nursing Notes were all reviewed and agreed with or any disagreements were addressed in the HPI. REVIEW OF SYSTEMS      Review of Systems     Positives and Pertinent negatives as per HPI.     PAST HISTORY     Past Medical History:  Past Medical History:   Diagnosis Date    Arthritis     Asthma     bronchitis    Bronchitis     HTN, goal below 140/90 2012    Psychiatric disorder     bipolar       Past Surgical History:  Past Surgical History:   Procedure Laterality Date    DELIVERY       HEENT      4 wisdom teeth removed in 2011    TUBAL LIGATION      c section       Family History:  Family History   Problem Relation Age of Onset    Asthma Son     Asthma Daughter     Psychiatric Disorder Father         schzophrenia    Asthma Son     Hypertension Father        Social History:  Social History     Tobacco Use    Smoking status: Every Day     Packs/day: 1.00     Types: Cigarettes    Smokeless tobacco: Never   Vaping Use    Vaping Use: Every day   Substance Use Topics

## 2023-10-21 ENCOUNTER — HOSPITAL ENCOUNTER (EMERGENCY)
Facility: HOSPITAL | Age: 43
Discharge: HOME | End: 2023-10-21
Attending: EMERGENCY MEDICINE
Payer: MEDICAID

## 2023-10-21 VITALS
OXYGEN SATURATION: 99 % | WEIGHT: 261.47 LBS | SYSTOLIC BLOOD PRESSURE: 132 MMHG | HEIGHT: 66 IN | DIASTOLIC BLOOD PRESSURE: 94 MMHG | TEMPERATURE: 99 F | HEART RATE: 77 BPM | RESPIRATION RATE: 16 BRPM | BODY MASS INDEX: 42.02 KG/M2

## 2023-10-21 DIAGNOSIS — I10 ACCELERATED HYPERTENSION: Primary | ICD-10-CM

## 2023-10-21 LAB
EKG ATRIAL RATE: 71 BPM
EKG DIAGNOSIS: NORMAL
EKG P AXIS: 48 DEGREES
EKG P-R INTERVAL: 148 MS
EKG Q-T INTERVAL: 394 MS
EKG QRS DURATION: 86 MS
EKG QTC CALCULATION (BAZETT): 428 MS
EKG R AXIS: -16 DEGREES
EKG T AXIS: 0 DEGREES
EKG VENTRICULAR RATE: 71 BPM

## 2023-10-21 PROCEDURE — 99283 EMERGENCY DEPT VISIT LOW MDM: CPT

## 2023-10-21 ASSESSMENT — PAIN DESCRIPTION - ORIENTATION: ORIENTATION: OTHER (COMMENT)

## 2023-10-21 ASSESSMENT — PAIN DESCRIPTION - LOCATION: LOCATION: GENERALIZED;KNEE

## 2023-10-21 ASSESSMENT — LIFESTYLE VARIABLES
HOW MANY STANDARD DRINKS CONTAINING ALCOHOL DO YOU HAVE ON A TYPICAL DAY: PATIENT DOES NOT DRINK
HOW OFTEN DO YOU HAVE A DRINK CONTAINING ALCOHOL: NEVER

## 2023-10-21 ASSESSMENT — PAIN DESCRIPTION - DESCRIPTORS: DESCRIPTORS: SORE

## 2023-10-21 ASSESSMENT — PAIN SCALES - GENERAL: PAINLEVEL_OUTOF10: 5

## 2023-10-21 NOTE — DISCHARGE INSTRUCTIONS
It was a pleasure taking care of you at [facility] Emergency Department today. We know that when you come to [facility], you are entrusting us with your health, comfort, and safety. Our physicians and nurses honor that trust, and we truly appreciate the opportunity to care for you and your loved ones. We also value your feedback. If you receive a survey about your Emergency Department experience today, please fill it out. We care about our patients' feedback, and we listen to what you have to say. Thank you!

## 2023-10-21 NOTE — ED NOTES
Discussed and reviewed discharge instructions with patient. Provided opportunity for questions, patient expressed understanding. Reviewed reasons to return to the Emergency Department such as sharp chest pain and shortness of breath. Patient stable at time of discharge.        Johanna Sullivan RN  10/21/23 0600

## 2024-02-12 ENCOUNTER — HOSPITAL ENCOUNTER (EMERGENCY)
Facility: HOSPITAL | Age: 44
Discharge: HOME | End: 2024-02-12
Attending: EMERGENCY MEDICINE
Payer: MEDICAID

## 2024-02-12 VITALS
TEMPERATURE: 98.2 F | WEIGHT: 250.88 LBS | RESPIRATION RATE: 17 BRPM | DIASTOLIC BLOOD PRESSURE: 85 MMHG | OXYGEN SATURATION: 96 % | SYSTOLIC BLOOD PRESSURE: 117 MMHG | HEIGHT: 66 IN | BODY MASS INDEX: 40.32 KG/M2 | HEART RATE: 70 BPM

## 2024-02-12 DIAGNOSIS — M25.562 ACUTE PAIN OF LEFT KNEE: Primary | ICD-10-CM

## 2024-02-12 PROCEDURE — 96372 THER/PROPH/DIAG INJ SC/IM: CPT

## 2024-02-12 PROCEDURE — 6360000002 HC RX W HCPCS: Performed by: EMERGENCY MEDICINE

## 2024-02-12 PROCEDURE — 99284 EMERGENCY DEPT VISIT MOD MDM: CPT

## 2024-02-12 RX ORDER — IBUPROFEN 800 MG/1
800 TABLET ORAL EVERY 8 HOURS PRN
Qty: 24 TABLET | Refills: 0 | Status: SHIPPED | OUTPATIENT
Start: 2024-02-12

## 2024-02-12 RX ORDER — KETOROLAC TROMETHAMINE 30 MG/ML
30 INJECTION, SOLUTION INTRAMUSCULAR; INTRAVENOUS ONCE
Status: COMPLETED | OUTPATIENT
Start: 2024-02-12 | End: 2024-02-12

## 2024-02-12 RX ORDER — HYDROCODONE BITARTRATE AND ACETAMINOPHEN 5; 325 MG/1; MG/1
1 TABLET ORAL EVERY 4 HOURS PRN
Qty: 10 TABLET | Refills: 0 | Status: SHIPPED | OUTPATIENT
Start: 2024-02-12 | End: 2024-02-15

## 2024-02-12 RX ADMIN — KETOROLAC TROMETHAMINE 30 MG: 30 INJECTION INTRAMUSCULAR; INTRAVENOUS at 03:00

## 2024-02-12 NOTE — ED TRIAGE NOTES
Discussed and reviewed discharge instructions with patient. Provided opportunity for questions, patient expressed understanding. Reviewed reasons to return to the Emergency Department. Patient stable at time of discharge.

## 2024-02-12 NOTE — ED PROVIDER NOTES
Neurological:      General: No focal deficit present.      Mental Status: She is alert and oriented to person, place, and time.   Psychiatric:         Mood and Affect: Mood normal.         Behavior: Behavior normal.          DIAGNOSTIC RESULTS   LABS:     No results found for this or any previous visit (from the past 24 hour(s)).    EKG: If performed, independent interpretation documented below in the MDM section     RADIOLOGY:  Non-plain film images such as CT, Ultrasound and MRI are read by the radiologist. Plain radiographic images are visualized and preliminarily interpreted by the ED Provider with the findings documented in the MDM section.     Interpretation per the Radiologist below, if available at the time of this note:     No orders to display        PROCEDURES   Unless otherwise noted below, none  Procedures     CRITICAL CARE TIME       EMERGENCY DEPARTMENT COURSE and DIFFERENTIAL DIAGNOSIS/MDM   Vitals:    Vitals:    02/12/24 0140 02/12/24 0345   BP: (!) 134/100 117/85   Pulse: 75 70   Resp: 16 17   Temp: 98.2 °F (36.8 °C)    TempSrc: Oral    SpO2: 100% 96%   Weight: 113.8 kg (250 lb 14.1 oz)    Height: 1.676 m (5' 6\")         Patient was given the following medications:  Medications   ketorolac (TORADOL) injection 30 mg (30 mg IntraMUSCular Given 2/12/24 0300)       Medical Decision Making  Differential diagnosis includes arthritis, meniscal injury, patellofemoral tracking syndrome, bursitis.  There is no significant effusion, no signs of septic joint.  Will treat symptomatically.  Have patient follow-up with orthopedics.    Risk  Prescription drug management.                 FINAL IMPRESSION     1. Acute pain of left knee          DISPOSITION/PLAN   Sirisha Saul's  results have been reviewed with her.  She has been counseled regarding her diagnosis, treatment, and plan.  She verbally conveys understanding and agreement of the signs, symptoms, diagnosis, treatment and prognosis and additionally

## 2024-03-04 ENCOUNTER — HOSPITAL ENCOUNTER (EMERGENCY)
Facility: HOSPITAL | Age: 44
Discharge: HOME | End: 2024-03-04
Attending: STUDENT IN AN ORGANIZED HEALTH CARE EDUCATION/TRAINING PROGRAM
Payer: MEDICAID

## 2024-03-04 VITALS
RESPIRATION RATE: 16 BRPM | TEMPERATURE: 98.1 F | HEIGHT: 66 IN | DIASTOLIC BLOOD PRESSURE: 80 MMHG | BODY MASS INDEX: 39.86 KG/M2 | HEART RATE: 92 BPM | SYSTOLIC BLOOD PRESSURE: 115 MMHG | WEIGHT: 248.02 LBS | OXYGEN SATURATION: 93 %

## 2024-03-04 DIAGNOSIS — R11.2 NAUSEA AND VOMITING, UNSPECIFIED VOMITING TYPE: Primary | ICD-10-CM

## 2024-03-04 DIAGNOSIS — R19.7 DIARRHEA, UNSPECIFIED TYPE: ICD-10-CM

## 2024-03-04 LAB
ALBUMIN SERPL-MCNC: 3.8 G/DL (ref 3.5–5)
ALBUMIN/GLOB SERPL: 0.9 (ref 1.1–2.2)
ALP SERPL-CCNC: 80 U/L (ref 45–117)
ALT SERPL-CCNC: 20 U/L (ref 12–78)
ANION GAP SERPL CALC-SCNC: 5 MMOL/L (ref 5–15)
APPEARANCE UR: ABNORMAL
AST SERPL-CCNC: 15 U/L (ref 15–37)
BACTERIA URNS QL MICRO: ABNORMAL /HPF
BASOPHILS # BLD: 0.1 K/UL (ref 0–0.1)
BASOPHILS NFR BLD: 1 % (ref 0–1)
BILIRUB SERPL-MCNC: 0.3 MG/DL (ref 0.2–1)
BILIRUB UR QL CFM: NEGATIVE
BUN SERPL-MCNC: 12 MG/DL (ref 6–20)
BUN/CREAT SERPL: 11 (ref 12–20)
CALCIUM SERPL-MCNC: 9 MG/DL (ref 8.5–10.1)
CHLORIDE SERPL-SCNC: 107 MMOL/L (ref 97–108)
CO2 SERPL-SCNC: 28 MMOL/L (ref 21–32)
COLOR UR: ABNORMAL
CREAT SERPL-MCNC: 1.1 MG/DL (ref 0.55–1.02)
DIFFERENTIAL METHOD BLD: ABNORMAL
EOSINOPHIL # BLD: 0.1 K/UL (ref 0–0.4)
EOSINOPHIL NFR BLD: 1 % (ref 0–7)
EPITH CASTS URNS QL MICRO: ABNORMAL /LPF
ERYTHROCYTE [DISTWIDTH] IN BLOOD BY AUTOMATED COUNT: 13.3 % (ref 11.5–14.5)
GLOBULIN SER CALC-MCNC: 4.1 G/DL (ref 2–4)
GLUCOSE SERPL-MCNC: 138 MG/DL (ref 65–100)
GLUCOSE UR STRIP.AUTO-MCNC: NEGATIVE MG/DL
HCG UR QL: NEGATIVE
HCT VFR BLD AUTO: 41.1 % (ref 35–47)
HGB BLD-MCNC: 13 G/DL (ref 11.5–16)
HGB UR QL STRIP: NEGATIVE
HYALINE CASTS URNS QL MICRO: >20 /LPF (ref 0–5)
IMM GRANULOCYTES # BLD AUTO: 0.1 K/UL (ref 0–0.04)
IMM GRANULOCYTES NFR BLD AUTO: 1 % (ref 0–0.5)
KETONES UR QL STRIP.AUTO: ABNORMAL MG/DL
LEUKOCYTE ESTERASE UR QL STRIP.AUTO: NEGATIVE
LIPASE SERPL-CCNC: 36 U/L (ref 13–75)
LYMPHOCYTES # BLD: 3.9 K/UL (ref 0.8–3.5)
LYMPHOCYTES NFR BLD: 33 % (ref 12–49)
MCH RBC QN AUTO: 29.4 PG (ref 26–34)
MCHC RBC AUTO-ENTMCNC: 31.6 G/DL (ref 30–36.5)
MCV RBC AUTO: 93 FL (ref 80–99)
MONOCYTES # BLD: 0.8 K/UL (ref 0–1)
MONOCYTES NFR BLD: 7 % (ref 5–13)
MUCOUS THREADS URNS QL MICRO: ABNORMAL /LPF
NEUTS SEG # BLD: 7 K/UL (ref 1.8–8)
NEUTS SEG NFR BLD: 57 % (ref 32–75)
NITRITE UR QL STRIP.AUTO: NEGATIVE
NRBC # BLD: 0 K/UL (ref 0–0.01)
NRBC BLD-RTO: 0 PER 100 WBC
PH UR STRIP: 5 (ref 5–8)
PLATELET # BLD AUTO: 343 K/UL (ref 150–400)
PMV BLD AUTO: 9.5 FL (ref 8.9–12.9)
POTASSIUM SERPL-SCNC: 3.8 MMOL/L (ref 3.5–5.1)
PROT SERPL-MCNC: 7.9 G/DL (ref 6.4–8.2)
PROT UR STRIP-MCNC: 30 MG/DL
RBC # BLD AUTO: 4.42 M/UL (ref 3.8–5.2)
RBC #/AREA URNS HPF: ABNORMAL /HPF (ref 0–5)
SODIUM SERPL-SCNC: 140 MMOL/L (ref 136–145)
SP GR UR REFRACTOMETRY: >1.03 (ref 1–1.03)
URINE CULTURE IF INDICATED: ABNORMAL
UROBILINOGEN UR QL STRIP.AUTO: 1 EU/DL (ref 0.2–1)
WBC # BLD AUTO: 11.9 K/UL (ref 3.6–11)
WBC URNS QL MICRO: ABNORMAL /HPF (ref 0–4)

## 2024-03-04 PROCEDURE — 36415 COLL VENOUS BLD VENIPUNCTURE: CPT

## 2024-03-04 PROCEDURE — 81025 URINE PREGNANCY TEST: CPT

## 2024-03-04 PROCEDURE — 99283 EMERGENCY DEPT VISIT LOW MDM: CPT

## 2024-03-04 PROCEDURE — 85025 COMPLETE CBC W/AUTO DIFF WBC: CPT

## 2024-03-04 PROCEDURE — 83690 ASSAY OF LIPASE: CPT

## 2024-03-04 PROCEDURE — 6370000000 HC RX 637 (ALT 250 FOR IP): Performed by: STUDENT IN AN ORGANIZED HEALTH CARE EDUCATION/TRAINING PROGRAM

## 2024-03-04 PROCEDURE — 80053 COMPREHEN METABOLIC PANEL: CPT

## 2024-03-04 PROCEDURE — 81001 URINALYSIS AUTO W/SCOPE: CPT

## 2024-03-04 RX ORDER — ONDANSETRON 2 MG/ML
4 INJECTION INTRAMUSCULAR; INTRAVENOUS ONCE
Status: DISCONTINUED | OUTPATIENT
Start: 2024-03-04 | End: 2024-03-04

## 2024-03-04 RX ORDER — SODIUM CHLORIDE, SODIUM LACTATE, POTASSIUM CHLORIDE, AND CALCIUM CHLORIDE .6; .31; .03; .02 G/100ML; G/100ML; G/100ML; G/100ML
1000 INJECTION, SOLUTION INTRAVENOUS ONCE
Status: DISCONTINUED | OUTPATIENT
Start: 2024-03-04 | End: 2024-03-04 | Stop reason: HOSPADM

## 2024-03-04 RX ORDER — ONDANSETRON 4 MG/1
4 TABLET, ORALLY DISINTEGRATING ORAL ONCE
Status: COMPLETED | OUTPATIENT
Start: 2024-03-04 | End: 2024-03-04

## 2024-03-04 RX ORDER — ONDANSETRON 4 MG/1
4 TABLET, ORALLY DISINTEGRATING ORAL 3 TIMES DAILY PRN
Qty: 8 TABLET | Refills: 0 | Status: SHIPPED | OUTPATIENT
Start: 2024-03-04

## 2024-03-04 RX ADMIN — ONDANSETRON 4 MG: 4 TABLET, ORALLY DISINTEGRATING ORAL at 02:27

## 2024-03-04 ASSESSMENT — PAIN - FUNCTIONAL ASSESSMENT: PAIN_FUNCTIONAL_ASSESSMENT: NONE - DENIES PAIN

## 2024-03-04 NOTE — ED PROVIDER NOTES
[facility] EMERGENCY DEPT  EMERGENCY DEPARTMENT ENCOUNTER       Pt Name: Sirisha Saul  MRN: [id number]  Birthdate 1980  Date of evaluation: 3/4/2024  Provider: Emile Rivera MD   PCP: Kayla Burton APRN - NP  Note Started: 2:54 AM EST 3/4/24     CHIEF COMPLAINT       Chief Complaint   Patient presents with    Emesis     Pt arrived to ED from work with 1 episode of vomiting.        HISTORY OF PRESENT ILLNESS: 1 or more elements      History From: patient, History limited by: none     Sirisha Saul is a 44 y.o. female presenting with vomiting, diarrhea.  Notes she was at work today and was moved to a different area that was much warmer.  She started getting sweaty, then had an episode of vomiting and diarrhea.  Notes she feels better now.  Notes mild nausea but no abdominal pain.  Denies fever.  No meds taken.         Please See MDM for Additional Details of the HPI/PMH  Nursing Notes were all reviewed and agreed with or any disagreements were addressed in the HPI.     REVIEW OF SYSTEMS        Positives and Pertinent negatives as per HPI.    PAST HISTORY     Past Medical History:  Past Medical History:   Diagnosis Date    Arthritis     Asthma     bronchitis    Bronchitis     HTN, goal below 140/90 2012    Psychiatric disorder     bipolar       Past Surgical History:  Past Surgical History:   Procedure Laterality Date    DELIVERY       HEENT      4 wisdom teeth removed in 2011    TUBAL LIGATION      c section       Family History:  Family History   Problem Relation Age of Onset    Asthma Son     Asthma Daughter     Psychiatric Disorder Father         schzophrenia    Asthma Son     Hypertension Father        Social History:  Social History     Tobacco Use    Smoking status: Every Day     Current packs/day: 1.00     Types: Cigarettes    Smokeless tobacco: Never   Vaping Use    Vaping Use: Every day   Substance Use Topics    Alcohol use: No    Drug use: No       Allergies:  Allergies   Allergen

## 2024-05-19 ENCOUNTER — HOSPITAL ENCOUNTER (EMERGENCY)
Facility: HOSPITAL | Age: 44
Discharge: HOME | End: 2024-05-19
Payer: MEDICAID

## 2024-05-19 VITALS
DIASTOLIC BLOOD PRESSURE: 103 MMHG | HEART RATE: 93 BPM | RESPIRATION RATE: 18 BRPM | WEIGHT: 253.75 LBS | OXYGEN SATURATION: 97 % | BODY MASS INDEX: 40.78 KG/M2 | SYSTOLIC BLOOD PRESSURE: 150 MMHG | TEMPERATURE: 98.4 F | HEIGHT: 66 IN

## 2024-05-19 DIAGNOSIS — G89.29 CHRONIC PAIN OF RIGHT KNEE: Primary | ICD-10-CM

## 2024-05-19 DIAGNOSIS — M25.561 CHRONIC PAIN OF RIGHT KNEE: Primary | ICD-10-CM

## 2024-05-19 PROCEDURE — 99282 EMERGENCY DEPT VISIT SF MDM: CPT

## 2024-05-19 ASSESSMENT — PAIN DESCRIPTION - ORIENTATION
ORIENTATION: RIGHT
ORIENTATION: RIGHT

## 2024-05-19 ASSESSMENT — PAIN DESCRIPTION - LOCATION
LOCATION: KNEE
LOCATION: KNEE

## 2024-05-19 ASSESSMENT — PAIN DESCRIPTION - DESCRIPTORS: DESCRIPTORS: ACHING

## 2024-05-19 ASSESSMENT — PAIN - FUNCTIONAL ASSESSMENT: PAIN_FUNCTIONAL_ASSESSMENT: 0-10

## 2024-05-19 ASSESSMENT — PAIN SCALES - GENERAL
PAINLEVEL_OUTOF10: 8
PAINLEVEL_OUTOF10: 10

## 2024-05-19 ASSESSMENT — LIFESTYLE VARIABLES
HOW OFTEN DO YOU HAVE A DRINK CONTAINING ALCOHOL: MONTHLY OR LESS
HOW MANY STANDARD DRINKS CONTAINING ALCOHOL DO YOU HAVE ON A TYPICAL DAY: PATIENT DOES NOT DRINK

## 2024-05-20 NOTE — DISCHARGE INSTRUCTIONS
It was a pleasure taking care of you at [facility] Emergency Department today.  We know that when you come to [facility], you are entrusting us with your health, comfort, and safety.  Our physicians and nurses honor that trust, and we truly appreciate the opportunity to care for you and your loved ones.      We also value your feedback.  If you receive a survey about your Emergency Department experience today, please fill it out.  We care about our patients' feedback, and we listen to what you have to say.  Thank you!

## 2024-05-20 NOTE — ED PROVIDER NOTES
was discussed)  None    Chronic Conditions:  has a past medical history of Arthritis, Asthma, Bronchitis, HTN, goal below 140/90, and Psychiatric disorder.     Social Determinants affecting Dx or Tx: None    Records Reviewed (source and summary of external records): Nursing Notes and Old Medical Records. Reviewed orthopedic notes related to right knee pain.     CC/HPI Summary, DDx, ED Course, and Reassessment: Patient presents to the ED for acute on chronic right knee pain after excessive walking at work today. Exam in benign. Xrays not clinical warranted at this time. Patient placed in a knee brace and work note provided, which patient requested. Recommended over the counter anti-inflammatories and follow up with orthopedics. She can return the ED for deterioration.            Disposition Considerations (Tests not done, Shared Decision Making, Pt Expectation of Test or Tx.): Considered xray. However, patient with chronic pain and no recent trauma. Thus, low suspension of fracture and xray not ordered. Patient in agreement with this plan.      FINAL IMPRESSION     1. Chronic pain of right knee        DISPOSITION/PLAN   DISPOSITION Decision To Discharge 05/19/2024 09:20:55 PM    Discharge Note: The patient is stable for discharge home. The signs, symptoms, diagnosis, and discharge instructions have been discussed, understanding conveyed, and agreed upon. The patient is to follow up as recommended or return to ER should their symptoms worsen.      PATIENT REFERRED TO:  Jevon Vyas PA-C  [address] [address] [phone]    In 1 week      [facility] EMERGENCY DEPT  [address] [place] [address]  [phone]    If symptoms worsen       DISCHARGE MEDICATIONS:     Medication List        ASK your doctor about these medications      albuterol sulfate  (90 Base) MCG/ACT inhaler  Commonly known as: PROVENTIL;VENTOLIN;PROAIR     ALPRAZOLAM PO     diclofenac sodium 1 %

## 2024-12-08 ENCOUNTER — HOSPITAL ENCOUNTER (EMERGENCY)
Facility: HOSPITAL | Age: 44
Discharge: HOME | End: 2024-12-08
Payer: MEDICAID

## 2024-12-08 VITALS
WEIGHT: 250 LBS | OXYGEN SATURATION: 97 % | HEIGHT: 66 IN | HEART RATE: 87 BPM | BODY MASS INDEX: 40.18 KG/M2 | RESPIRATION RATE: 18 BRPM | SYSTOLIC BLOOD PRESSURE: 130 MMHG | DIASTOLIC BLOOD PRESSURE: 84 MMHG | TEMPERATURE: 97.5 F

## 2024-12-08 DIAGNOSIS — G89.29 CHRONIC PAIN OF LEFT KNEE: Primary | ICD-10-CM

## 2024-12-08 DIAGNOSIS — M25.562 CHRONIC PAIN OF LEFT KNEE: Primary | ICD-10-CM

## 2024-12-08 PROCEDURE — 99283 EMERGENCY DEPT VISIT LOW MDM: CPT

## 2024-12-08 RX ORDER — AMLODIPINE BESYLATE 5 MG/1
5 TABLET ORAL DAILY
COMMUNITY
Start: 2024-03-17

## 2024-12-08 RX ORDER — MELOXICAM 7.5 MG/1
7.5 TABLET ORAL DAILY
Qty: 15 TABLET | Refills: 0 | Status: SHIPPED | OUTPATIENT
Start: 2024-12-08 | End: 2024-12-23

## 2024-12-08 ASSESSMENT — PAIN DESCRIPTION - ONSET: ONSET: ON-GOING

## 2024-12-08 ASSESSMENT — PAIN DESCRIPTION - PAIN TYPE: TYPE: ACUTE PAIN

## 2024-12-08 ASSESSMENT — PAIN - FUNCTIONAL ASSESSMENT
PAIN_FUNCTIONAL_ASSESSMENT: 0-10
PAIN_FUNCTIONAL_ASSESSMENT: ACTIVITIES ARE NOT PREVENTED

## 2024-12-08 ASSESSMENT — PAIN DESCRIPTION - LOCATION: LOCATION: KNEE

## 2024-12-08 ASSESSMENT — PAIN DESCRIPTION - ORIENTATION: ORIENTATION: RIGHT

## 2024-12-08 ASSESSMENT — PAIN DESCRIPTION - FREQUENCY: FREQUENCY: CONTINUOUS

## 2024-12-08 ASSESSMENT — PAIN DESCRIPTION - DESCRIPTORS: DESCRIPTORS: ACHING;SHARP

## 2024-12-08 ASSESSMENT — PAIN SCALES - GENERAL: PAINLEVEL_OUTOF10: 10

## 2024-12-08 NOTE — ED PROVIDER NOTES
[facility] EMERGENCY DEPT  EMERGENCY DEPARTMENT ENCOUNTER       Pt Name: Sirisha Saul  MRN: [id number]  Birthdate 1980  Date of evaluation: 2024  Provider: Misa Garcia PA-C   PCP: Kayla Burton APRN - NP  Note Started: 6:54 PM EST 24     CHIEF COMPLAINT       No chief complaint on file.       HISTORY OF PRESENT ILLNESS: 1 or more elements      History From: Patient  HPI Limitations: None     Sirisha Saul is a 44 y.o. female who presents complaining of left knee pain x a few days.  Patient reports that she will have chronic pain in the left knee however reports that has been worse in the last few days.  She reports that she has been on her feet a lot due to work and she is having worsening pain.  She reports taking over-the-counter analgesics and applying cream without much relief.  She denies any traumatic injury recent fall.  She denies any radiating pain.  She denies any extremity numbness, extremity weakness, extremity tingling.  She denies any fever, chills, nausea, vomiting.  She denies any calf pain, calf swelling, calf warmth.  She does not currently see a orthopedic specialist for her knee.     Nursing Notes were all reviewed and agreed with or any disagreements were addressed in the HPI.     REVIEW OF SYSTEMS      Review of Systems     Positives and Pertinent negatives as per HPI.    PAST HISTORY     Past Medical History:  Past Medical History:   Diagnosis Date    Arthritis     Asthma     bronchitis    Bronchitis     HTN, goal below 140/90 2012    Psychiatric disorder     bipolar       Past Surgical History:  Past Surgical History:   Procedure Laterality Date    DELIVERY       HEENT      4 wisdom teeth removed in 2011    TUBAL LIGATION      c section       Family History:  Family History   Problem Relation Age of Onset    Asthma Son     Asthma Daughter     Psychiatric Disorder Father         schzophrenia    Asthma Son     Hypertension Father        Social History:  Social

## 2024-12-08 NOTE — ED TRIAGE NOTES
Patient arrives to ED from home for c/o left knee pain x 2 days. Patient states chronic however sharp pain in knee. Patient denies injury or trauma.

## 2024-12-09 NOTE — ED NOTES
Pt presents ambulatory to ED complaining of left knee pain x 2 days, non traumatic, denies recent injury. Pt is alert and oriented x 4, RR even and unlabored, skin is warm and dry. Assesment completed and pt updated on plan of care.       Emergency Department Nursing Plan of Care       The Nursing Plan of Care is developed from the Nursing assessment and Emergency Department Attending provider initial evaluation.  The plan of care may be reviewed in the “ED Provider note”.    The Plan of Care was developed with the following considerations:   Patient / Family readiness to learn indicated by:verbalized understanding  Persons(s) to be included in education: patient  Barriers to Learning/Limitations:None    Signed     ALEX HEBERT, WAYNE    12/8/2024   7:00 PM

## 2025-05-06 ENCOUNTER — HOSPITAL ENCOUNTER (EMERGENCY)
Facility: HOSPITAL | Age: 45
Discharge: HOME | End: 2025-05-06
Attending: EMERGENCY MEDICINE
Payer: MEDICAID

## 2025-05-06 ENCOUNTER — APPOINTMENT (OUTPATIENT)
Facility: HOSPITAL | Age: 45
End: 2025-05-06
Payer: MEDICAID

## 2025-05-06 VITALS
HEIGHT: 65 IN | TEMPERATURE: 98.1 F | SYSTOLIC BLOOD PRESSURE: 127 MMHG | HEART RATE: 84 BPM | DIASTOLIC BLOOD PRESSURE: 96 MMHG | BODY MASS INDEX: 46.82 KG/M2 | RESPIRATION RATE: 18 BRPM | OXYGEN SATURATION: 100 % | WEIGHT: 281 LBS

## 2025-05-06 DIAGNOSIS — S80.01XA CONTUSION OF RIGHT KNEE, INITIAL ENCOUNTER: Primary | ICD-10-CM

## 2025-05-06 PROCEDURE — 6370000000 HC RX 637 (ALT 250 FOR IP): Performed by: EMERGENCY MEDICINE

## 2025-05-06 PROCEDURE — 73562 X-RAY EXAM OF KNEE 3: CPT

## 2025-05-06 PROCEDURE — 99283 EMERGENCY DEPT VISIT LOW MDM: CPT

## 2025-05-06 RX ORDER — IBUPROFEN 400 MG/1
800 TABLET, FILM COATED ORAL ONCE
Status: COMPLETED | OUTPATIENT
Start: 2025-05-06 | End: 2025-05-06

## 2025-05-06 RX ORDER — IBUPROFEN 600 MG/1
600 TABLET, FILM COATED ORAL EVERY 6 HOURS PRN
Qty: 20 TABLET | Refills: 1 | Status: SHIPPED | OUTPATIENT
Start: 2025-05-06 | End: 2025-05-16

## 2025-05-06 RX ADMIN — IBUPROFEN 800 MG: 400 TABLET, FILM COATED ORAL at 08:11

## 2025-05-06 ASSESSMENT — PAIN - FUNCTIONAL ASSESSMENT: PAIN_FUNCTIONAL_ASSESSMENT: 0-10

## 2025-05-06 ASSESSMENT — PAIN DESCRIPTION - DESCRIPTORS: DESCRIPTORS: ACHING

## 2025-05-06 ASSESSMENT — PAIN DESCRIPTION - LOCATION: LOCATION: KNEE

## 2025-05-06 ASSESSMENT — PAIN SCALES - GENERAL: PAINLEVEL_OUTOF10: 10

## 2025-05-06 ASSESSMENT — PAIN DESCRIPTION - ORIENTATION: ORIENTATION: RIGHT

## 2025-05-06 NOTE — ED NOTES
Discharge instructions were given to the patient by Brittani Abarca RN .  The patient left the Emergency Department alert and oriented and in no acute distress with 1 prescription(s). The patient was encouraged to call or return to the ED for worsening issues or problems and was encouraged to schedule a follow up appointment for continuing care.      Ambulation assessment completed before discharge.  Pt left Emergency Department ambulating at baseline with no ortho devices  Ortho device education: none      The patient verbalized understanding of discharge instructions and prescriptions; all questions were answered. The patient has no further concerns at this time.

## 2025-05-06 NOTE — DISCHARGE INSTRUCTIONS
It was a pleasure taking care of you at [facility] Emergency Department today.      We know that when you come to [facility], you are entrusting us with your health, comfort, and safety.  Our physicians and nurses honor that trust, and we appreciate the opportunity to care for you and your loved ones.  We also value your feedback, and we would like to hear from you.    When you receive a  >>> survey <<< about your Emergency Department experience today, please fill it out. We review every single response from our patients. Thank you!    Sincerely,  Dr. Aki Moise MD

## 2025-05-06 NOTE — ED NOTES
Pt presents to ED complaining of right knee pain x 2 days. Pt states she fell onto her knee with no head injury or LOC. Pt states she took Tylenol and applied Biofreeze with no relief. Pt is alert and oriented x 4, RR even and unlabored, skin is warm and dry. Pt appears in NAD at this time. Assessment completed and pt updated on plan of care.  Call bell in reach.   Emergency Department Nursing Plan of Care  The Nursing Plan of Care is developed from the Nursing assessment and Emergency Department Attending provider initial evaluation.  The plan of care may be reviewed in the “ED Provider note”.  The Plan of Care was developed with the following considerations:  Patient / Family readiness to learn indicated by:Refer to Medical chart in [facility]  Persons(s) to be included in education: Refer to Medical chart in [facility]  Barriers to Learning/Limitations:Normal

## 2025-05-06 NOTE — ED PROVIDER NOTES
EMERGENCY DEPARTMENT HISTORY AND PHYSICAL EXAM    Date: 2025  Patient Name: Sirisha Saul  Patient Age and Sex: 45 y.o. female  MRN:  [id number]  CSN:  [id number]    History of Present Illness     Chief Complaint   Patient presents with    Knee Injury       History Provided By: Patient    Ability to gather history was limited by:     HPI: Sirisha Saul, 45 y.o. female   Complains of mild pain in the anterior and medial aspects of her right knee, since yesterday.  She reports a minor slip and fall yesterday during which she landed on her knee.  Pain is aching, not sharp, and she is walking with a mild limp.      Tobacco Use      Smoking status: Every Day        Packs/day: 1.00        Types: Cigarettes      Smokeless tobacco: Never     Past History   The patient's medical, surgical, and social history were reviewed by me today.    Current Medications:  No current facility-administered medications on file prior to encounter.     Current Outpatient Medications on File Prior to Encounter   Medication Sig Dispense Refill    amLODIPine (NORVASC) 5 MG tablet Take 1 tablet by mouth daily      ALPRAZOLAM PO Take by mouth (Patient not taking: Reported on 2024)      albuterol sulfate HFA (PROVENTIL;VENTOLIN;PROAIR) 108 (90 Base) MCG/ACT inhaler Inhale 2 puffs into the lungs every 4 hours as needed (Patient not taking: Reported on 2024)      topiramate (TOPAMAX) 50 MG tablet Take 50 mg by mouth daily (Patient not taking: Reported on 2024)         Past Medical History:   Diagnosis Date    Arthritis     Asthma     bronchitis    Bronchitis     HTN, goal below 140/90 2012    Psychiatric disorder     bipolar       Past Surgical History:   Procedure Laterality Date    DELIVERY       HEENT      4 wisdom teeth removed in 2011    TUBAL LIGATION      c section       Social History     Tobacco Use    Smoking status: Every Day     Current packs/day: 1.00     Types: Cigarettes    Smokeless tobacco:

## 2025-05-06 NOTE — ED TRIAGE NOTES
Pt reports slipping on a wet floor and landing on her right knee. Pt reports pain has been getting worse, now with swelling.. Pt has been using biofreeze and tylenol without relief.

## 2025-07-29 ENCOUNTER — ANESTHESIA EVENT (OUTPATIENT)
Facility: HOSPITAL | Age: 45
End: 2025-07-29
Payer: MEDICAID

## 2025-07-30 ENCOUNTER — ANESTHESIA (OUTPATIENT)
Facility: HOSPITAL | Age: 45
End: 2025-07-30
Payer: MEDICAID

## 2025-07-30 ENCOUNTER — HOSPITAL ENCOUNTER (OUTPATIENT)
Facility: HOSPITAL | Age: 45
Setting detail: OUTPATIENT SURGERY
Discharge: HOME | End: 2025-07-30
Attending: STUDENT IN AN ORGANIZED HEALTH CARE EDUCATION/TRAINING PROGRAM | Admitting: STUDENT IN AN ORGANIZED HEALTH CARE EDUCATION/TRAINING PROGRAM
Payer: MEDICAID

## 2025-07-30 VITALS
WEIGHT: 285.8 LBS | HEIGHT: 60 IN | DIASTOLIC BLOOD PRESSURE: 78 MMHG | SYSTOLIC BLOOD PRESSURE: 119 MMHG | RESPIRATION RATE: 20 BRPM | BODY MASS INDEX: 56.11 KG/M2 | TEMPERATURE: 97.7 F | OXYGEN SATURATION: 95 % | HEART RATE: 67 BPM

## 2025-07-30 LAB — HCG UR QL: NEGATIVE

## 2025-07-30 PROCEDURE — 2580000003 HC RX 258

## 2025-07-30 PROCEDURE — 3600007502: Performed by: STUDENT IN AN ORGANIZED HEALTH CARE EDUCATION/TRAINING PROGRAM

## 2025-07-30 PROCEDURE — 3700000001 HC ADD 15 MINUTES (ANESTHESIA): Performed by: STUDENT IN AN ORGANIZED HEALTH CARE EDUCATION/TRAINING PROGRAM

## 2025-07-30 PROCEDURE — 81025 URINE PREGNANCY TEST: CPT

## 2025-07-30 PROCEDURE — 6360000002 HC RX W HCPCS

## 2025-07-30 PROCEDURE — 3600007512: Performed by: STUDENT IN AN ORGANIZED HEALTH CARE EDUCATION/TRAINING PROGRAM

## 2025-07-30 PROCEDURE — 3700000000 HC ANESTHESIA ATTENDED CARE: Performed by: STUDENT IN AN ORGANIZED HEALTH CARE EDUCATION/TRAINING PROGRAM

## 2025-07-30 PROCEDURE — 7100000011 HC PHASE II RECOVERY - ADDTL 15 MIN: Performed by: STUDENT IN AN ORGANIZED HEALTH CARE EDUCATION/TRAINING PROGRAM

## 2025-07-30 PROCEDURE — 7100000010 HC PHASE II RECOVERY - FIRST 15 MIN: Performed by: STUDENT IN AN ORGANIZED HEALTH CARE EDUCATION/TRAINING PROGRAM

## 2025-07-30 PROCEDURE — 2709999900 HC NON-CHARGEABLE SUPPLY: Performed by: STUDENT IN AN ORGANIZED HEALTH CARE EDUCATION/TRAINING PROGRAM

## 2025-07-30 RX ORDER — 0.9 % SODIUM CHLORIDE 0.9 %
INTRAVENOUS SOLUTION INTRAVENOUS
Status: DISCONTINUED | OUTPATIENT
Start: 2025-07-30 | End: 2025-07-30 | Stop reason: SDUPTHER

## 2025-07-30 RX ORDER — GLYCOPYRROLATE 0.2 MG/ML
INJECTION INTRAMUSCULAR; INTRAVENOUS
Status: DISCONTINUED | OUTPATIENT
Start: 2025-07-30 | End: 2025-07-30 | Stop reason: SDUPTHER

## 2025-07-30 RX ORDER — SODIUM CHLORIDE 9 MG/ML
25 INJECTION, SOLUTION INTRAVENOUS PRN
Status: DISCONTINUED | OUTPATIENT
Start: 2025-07-30 | End: 2025-07-30 | Stop reason: HOSPADM

## 2025-07-30 RX ORDER — SODIUM CHLORIDE 0.9 % (FLUSH) 0.9 %
5-40 SYRINGE (ML) INJECTION PRN
Status: DISCONTINUED | OUTPATIENT
Start: 2025-07-30 | End: 2025-07-30 | Stop reason: HOSPADM

## 2025-07-30 RX ORDER — LIDOCAINE HYDROCHLORIDE 20 MG/ML
INJECTION, SOLUTION EPIDURAL; INFILTRATION; INTRACAUDAL; PERINEURAL
Status: DISCONTINUED | OUTPATIENT
Start: 2025-07-30 | End: 2025-07-30 | Stop reason: SDUPTHER

## 2025-07-30 RX ORDER — SODIUM CHLORIDE 0.9 % (FLUSH) 0.9 %
5-40 SYRINGE (ML) INJECTION EVERY 12 HOURS SCHEDULED
Status: DISCONTINUED | OUTPATIENT
Start: 2025-07-30 | End: 2025-07-30 | Stop reason: HOSPADM

## 2025-07-30 RX ORDER — LABETALOL HYDROCHLORIDE 5 MG/ML
10 INJECTION, SOLUTION INTRAVENOUS ONCE
Status: DISCONTINUED | OUTPATIENT
Start: 2025-07-30 | End: 2025-07-30 | Stop reason: HOSPADM

## 2025-07-30 RX ADMIN — PROPOFOL 70 MG: 10 INJECTION, EMULSION INTRAVENOUS at 07:34

## 2025-07-30 RX ADMIN — SODIUM CHLORIDE 250 ML: 9 INJECTION, SOLUTION INTRAVENOUS at 07:39

## 2025-07-30 RX ADMIN — GLYCOPYRROLATE 0.1 MG: 0.2 INJECTION, SOLUTION INTRAMUSCULAR; INTRAVENOUS at 07:34

## 2025-07-30 RX ADMIN — LIDOCAINE HYDROCHLORIDE 50 MG: 20 INJECTION, SOLUTION EPIDURAL; INFILTRATION; INTRACAUDAL; PERINEURAL at 07:34

## 2025-07-30 ASSESSMENT — PAIN - FUNCTIONAL ASSESSMENT: PAIN_FUNCTIONAL_ASSESSMENT: 0-10

## 2025-07-30 ASSESSMENT — LIFESTYLE VARIABLES: SMOKING_STATUS: 1

## 2025-07-30 NOTE — OP NOTE
[facility]                  Colonoscopy Operative Report    7/30/2025      Sirisha Saul  [id number]  1980    Procedure Type:   Colonoscopy --screening     Indications:    Screening colonoscopy     Pre-operative Diagnosis: see indication above    Post-operative Diagnosis:  See findings below    :  Lisbeth Monterroso MD    Referring Provider: Kayla Burton APRN - NP      Sedation:  MAC anesthesia Propofol    Pre-Procedural Exam:      Airway: clear,  No airway problems anticipated  Heart: RRR, without gallops or rubs  Lungs: clear bilaterally without wheezes, crackles, or rhonchi  Abdomen: soft, nontender, nondistended, bowel sounds present  Mental Status: awake, alert and oriented to person, place and time     Procedure Details:  After informed consent was obtained with all risks and benefits of procedure explained and preoperative exam completed, the patient was taken to the endoscopy suite and placed in the left lateral decubitus position.  Upon sequential sedation as per above, a digital rectal exam was performed .  The adult Olympus videocolonoscope  was inserted in the rectum and carefully advanced to the sigmoid colon.  The quality of preparation was poor.  The procedure was aborted    Findings:   Rectum: semisolid stool coating the walls of the colon, easily irrigated  Sigmoid: there was significant semisolid and solid stool in the sigmoid colon obscuring visualization.  The procedure was aborted  Descending Colon: not intubated  Transverse Colon: not intubated  Ascending Colon: not intubated  Cecum: not intubated  Terminal Ileum: not intubated      Specimen Removed:  none    Complications: None.     EBL:  None.    Impression:    Inadequate bowel preparation    Recommendations: -- Follow up in GI clinic and reschedule for colonoscopy w/ 2-day prep  -Repeat colonoscopy within 1 year  - Regular diet.    -Resume normal medication(s).     Discharge Disposition:

## 2025-07-30 NOTE — DISCHARGE INSTRUCTIONS
Sirisha Saul  [id number]  1980    COLONOSCOPY DISCHARGE INSTRUCTIONS    For 24 hours after general anesthesia or intravenous analgesia / sedation  you may experience:  Drowsiness, dizziness, sleepiness, or confusion  Difficulty remembering or delayed reaction times  Difficulty with your balance, especially while walking, move slowly and carefully, do not make sudden position changes  Difficulty focusing or blurred vision    Discomfort:  Redness at IV site- apply warm compress to area; if redness or soreness persist- contact your physician  There may be a slight amount of blood passed from the rectum  Gaseous discomfort- walking, belching will help relieve any discomfort    DIET:   Regular diet.   - however -  remember your colon is empty and a heavy meal will produce gas.   Avoid these foods:  vegetables, fried / greasy foods, carbonated drinks for today    BLOOD-THINNERS:     MEDICATION:  (See attached)     ACTIVITY:  You may not resume your normal daily activities until tomorrow AM; it is recommended that you spend the remainder of the day resting -  avoid any strenuous activity.  You may not operate a vehicle for 12 hours  You may not engage in an occupation involving machinery or appliances for rest of today  You may not drink alcoholic beverages for at least 12 hours  Avoid making any critical decisions for at least 24 hour    CALL M.D. WITH ANY SIGN OF:   Increasing pain, nausea, vomiting  Abdominal distension (swelling)  New increased bleeding (oral or rectal)  Fever (chills)  Pain in chest area  Bloody discharge from nose or mouth  Shortness of breath    You may take any Advil, Aspirin, Ibuprofen, Motrin, Aleve, or Tylenol as needed for pain.    IMPRESSION:  Your prep was not adequate to complete the colonoscopy.  We will have to schedule a clinic visit so that we can discuss a proper bowel preparation    Follow-up Instructions:  Results of any biopsies (if taken) will typically be available in

## 2025-07-30 NOTE — ANESTHESIA PRE PROCEDURE
Department of Anesthesiology  Preprocedure Note       Name:  Sirisha Saul   Age:  45 y.o.  :  1980                                          MRN:  [id number]         Date:  2025      Surgeon: Surgeon(s):  Lisbeth Monterroso MD    Procedure: Procedure(s):  COLONOSCOPY WITH BIOPSY    Medications prior to admission:   Prior to Admission medications    Medication Sig Start Date End Date Taking? Authorizing Provider   amLODIPine (NORVASC) 5 MG tablet Take 1 tablet by mouth daily 3/17/24  Yes Provider, MD Boaz   ALPRAZOLAM PO Take by mouth   Yes Automatic Reconciliation, Ar   albuterol sulfate HFA (PROVENTIL;VENTOLIN;PROAIR) 108 (90 Base) MCG/ACT inhaler Inhale 2 puffs into the lungs every 4 hours as needed 9/15/20  Yes Automatic Reconciliation, Ar   topiramate (TOPAMAX) 50 MG tablet Take 1 tablet by mouth daily   Yes Automatic Reconciliation, Ar   ibuprofen (ADVIL;MOTRIN) 600 MG tablet Take 1 tablet by mouth every 6 hours as needed for Pain 25  Aki Moise MD       Current medications:    No current facility-administered medications for this encounter.       Allergies:    Allergies   Allergen Reactions   • Benzonatate Other (See Comments)     Sneezing   • Codeine Nausea And Vomiting     Pt denies this allergy   • Guaifenesin Nausea And Vomiting   • Tramadol Rash       Problem List:    Patient Active Problem List   Diagnosis Code   • Acute bronchitis J20.9   • HTN, goal below 140/90 I10   • Neck pain M54.2   • Asthma J45.909   • Elbow pain, left M25.522   • Chronic bronchitis (HCC) J42       Past Medical History:        Diagnosis Date   • Arthritis    • Asthma     bronchitis   • Bronchitis    • HTN, goal below 140/90 2012   • Psychiatric disorder     bipolar       Past Surgical History:        Procedure Laterality Date   • DELIVERY      • HEENT      4 wisdom teeth removed in 2011   • TUBAL LIGATION      c section       Social History:    Social History

## 2025-07-30 NOTE — ANESTHESIA POSTPROCEDURE EVALUATION
Department of Anesthesiology  Postprocedure Note    Patient: Sirisha Saul  MRN: [id number]  YOB: 1980  Date of evaluation: 7/30/2025    Procedure Summary       Date: 07/30/25 Room / Location: [facility] ENDO 03 / [facility]    Anesthesia Start: 0732 Anesthesia Stop: 0742    Procedure: COLONOSCOPY WITH BIOPSY Diagnosis:       Colon cancer screening      (Colon cancer screening [Z12.11])    Surgeons: Lisbeth Monterroso MD Responsible Provider: Gigi Tariq MD    Anesthesia Type: TIVA ASA Status: 3            Anesthesia Type: No value filed.    Tamar Phase I: Tamar Score: 10    Tamar Phase II: Tamar Score: 10    Anesthesia Post Evaluation    Patient location during evaluation: PACU  Patient participation: complete - patient participated  Level of consciousness: awake  Airway patency: patent  Nausea & Vomiting: no vomiting  Cardiovascular status: hemodynamically stable  Respiratory status: acceptable  Hydration status: euvolemic    No notable events documented.

## 2025-07-30 NOTE — PERIOP NOTE
ARRIVAL INFORMATION:  Verified patient name and date of birth, scheduled procedure, and informed consent.     : Rene tabares contact number: [phone]  Physician and staff can share information with the .     Receive texts: no    Belongings with patient include:  Clothing,None    GI FOCUSED ASSESSMENT:  Neuro: Awake, alert, oriented x4  Respiratory: even and unlabored   GI: soft and non-distended  EKG Rhythm: normal sinus rhythm    Education:Reviewed general discharge instructions and  information.      Scope pre cleaned by Nikunj ET

## 2025-07-30 NOTE — H&P
Gastroenterology History and Physical    Patient: Sirisha Saul    Physician: Lisbeth Monterroso MD    Vital Signs: Pulse 73, temperature 98.1 °F (36.7 °C), temperature source Temporal, resp. rate 15, height 1.524 m (5'), weight 129.6 kg (285 lb 12.8 oz), SpO2 99%.    Allergies:   Allergies   Allergen Reactions    Benzonatate Other (See Comments)     Sneezing    Codeine Nausea And Vomiting     Pt denies this allergy    Guaifenesin Nausea And Vomiting    Tramadol Rash       Procedure: colonoscopy    Indication: screening for colon cancer    History:  Past Medical History:   Diagnosis Date    Arthritis     Asthma     bronchitis    Bronchitis     HTN, goal below 140/90 2012    Psychiatric disorder     bipolar      Past Surgical History:   Procedure Laterality Date    DELIVERY       HEENT      4 wisdom teeth removed in 2011    TUBAL LIGATION      c section      Social History     Socioeconomic History    Marital status: Single     Spouse name: None    Number of children: None    Years of education: None    Highest education level: None   Tobacco Use    Smoking status: Every Day     Current packs/day: 1.00     Types: Cigarettes    Smokeless tobacco: Never   Vaping Use    Vaping status: Every Day   Substance and Sexual Activity    Alcohol use: No    Drug use: No    Sexual activity: Not Currently     Birth control/protection: Condom      Family History   Problem Relation Age of Onset    Asthma Son     Asthma Daughter     Psychiatric Disorder Father         schzophrenia    Asthma Son     Hypertension Father        Medications:   Prior to Admission medications    Medication Sig Start Date End Date Taking? Authorizing Provider   amLODIPine (NORVASC) 5 MG tablet Take 1 tablet by mouth daily 3/17/24  Yes Provider, MD Boaz   ALPRAZOLAM PO Take by mouth   Yes Automatic Reconciliation, Ar   albuterol sulfate HFA (PROVENTIL;VENTOLIN;PROAIR) 108 (90 Base) MCG/ACT inhaler Inhale 2 puffs into the lungs every

## 2025-07-30 NOTE — PROGRESS NOTES
Endoscopy recovery  Patient returned to baseline, vital signs stable (see vital sign flowsheet). Patient offered liquids and tolerated well. Respiratory status within defined limits. Abdomen soft not tender. Skin with in defined limits. Responsible party driving patient home was given the opportunity to ask questions. Patient discharged with documented belongings.    0809  Spoke with Anesthesia about pt's elevated BP's and received an order for 10 mg labetolol.   On return to pt, bp cuff was adjusted on the arm and subsequent BP readings were WNL.  Labetolol was held.

## (undated) DEVICE — ENDOSCOPIC KIT COMPLIANCE ENDOKIT

## (undated) DEVICE — SET GRAV CK VLV NEEDLESS ST 3 GANGED 4WAY STPCOCK HI FLO 10

## (undated) DEVICE — CUFF BLD PRSS AD CLTH SGL TB W/ BAYNT CONN ROUNDED CORNER

## (undated) DEVICE — IV START KIT: Brand: MEDLINE